# Patient Record
Sex: MALE | Race: WHITE | Employment: OTHER | URBAN - METROPOLITAN AREA
[De-identification: names, ages, dates, MRNs, and addresses within clinical notes are randomized per-mention and may not be internally consistent; named-entity substitution may affect disease eponyms.]

---

## 2017-01-01 ENCOUNTER — ANESTHESIA EVENT (OUTPATIENT)
Dept: SURGERY | Age: 82
DRG: 470 | End: 2017-01-01
Payer: MEDICARE

## 2017-01-01 ENCOUNTER — HOME CARE VISIT (OUTPATIENT)
Dept: HOME HEALTH SERVICES | Facility: HOME HEALTH | Age: 82
End: 2017-01-01

## 2017-01-01 ENCOUNTER — HOME CARE VISIT (OUTPATIENT)
Dept: SCHEDULING | Facility: HOME HEALTH | Age: 82
End: 2017-01-01
Payer: MEDICARE

## 2017-01-01 ENCOUNTER — APPOINTMENT (OUTPATIENT)
Dept: CT IMAGING | Age: 82
DRG: 871 | End: 2017-01-01
Attending: INTERNAL MEDICINE
Payer: MEDICARE

## 2017-01-01 ENCOUNTER — HOSPITAL ENCOUNTER (OUTPATIENT)
Dept: LAB | Age: 82
Discharge: HOME | End: 2017-01-11
Attending: INTERNAL MEDICINE
Payer: MEDICARE

## 2017-01-01 ENCOUNTER — HOSPITAL ENCOUNTER (EMERGENCY)
Age: 82
Discharge: HOME | End: 2017-03-14
Payer: MEDICARE

## 2017-01-01 ENCOUNTER — APPOINTMENT (OUTPATIENT)
Dept: ULTRASOUND IMAGING | Age: 82
DRG: 871 | End: 2017-01-01
Attending: INTERNAL MEDICINE
Payer: MEDICARE

## 2017-01-01 ENCOUNTER — HOSPITAL ENCOUNTER (INPATIENT)
Age: 82
LOS: 5 days | Discharge: HOME HEALTH | DRG: 871 | End: 2017-02-06
Attending: EMERGENCY MEDICINE | Admitting: INTERNAL MEDICINE
Payer: MEDICARE

## 2017-01-01 ENCOUNTER — HOSPITAL ENCOUNTER (INPATIENT)
Age: 82
LOS: 1 days | Discharge: HOME HEALTH | DRG: 470 | End: 2017-02-24
Attending: ORTHOPAEDIC SURGERY | Admitting: ORTHOPAEDIC SURGERY
Payer: MEDICARE

## 2017-01-01 ENCOUNTER — HOME CARE VISIT (OUTPATIENT)
Dept: HOME HEALTH SERVICES | Facility: HOME HEALTH | Age: 82
End: 2017-01-01
Payer: MEDICARE

## 2017-01-01 ENCOUNTER — HOSPITAL ENCOUNTER (OUTPATIENT)
Dept: PHYSICAL THERAPY | Age: 82
End: 2017-01-01

## 2017-01-01 ENCOUNTER — APPOINTMENT (OUTPATIENT)
Dept: GENERAL RADIOLOGY | Age: 82
DRG: 871 | End: 2017-01-01
Attending: EMERGENCY MEDICINE
Payer: MEDICARE

## 2017-01-01 ENCOUNTER — SURGERY (OUTPATIENT)
Age: 82
End: 2017-01-01

## 2017-01-01 ENCOUNTER — APPOINTMENT (OUTPATIENT)
Dept: GENERAL RADIOLOGY | Age: 82
DRG: 871 | End: 2017-01-01
Attending: FAMILY MEDICINE
Payer: MEDICARE

## 2017-01-01 ENCOUNTER — HOSPITAL ENCOUNTER (OUTPATIENT)
Dept: SURGERY | Age: 82
Discharge: HOME | End: 2017-02-06

## 2017-01-01 ENCOUNTER — ANESTHESIA (OUTPATIENT)
Dept: SURGERY | Age: 82
DRG: 470 | End: 2017-01-01
Payer: MEDICARE

## 2017-01-01 ENCOUNTER — APPOINTMENT (OUTPATIENT)
Dept: CT IMAGING | Age: 82
End: 2017-01-01
Attending: EMERGENCY MEDICINE
Payer: MEDICARE

## 2017-01-01 ENCOUNTER — APPOINTMENT (OUTPATIENT)
Dept: GENERAL RADIOLOGY | Age: 82
End: 2017-01-01
Payer: MEDICARE

## 2017-01-01 ENCOUNTER — APPOINTMENT (OUTPATIENT)
Dept: CT IMAGING | Age: 82
DRG: 871 | End: 2017-01-01
Attending: EMERGENCY MEDICINE
Payer: MEDICARE

## 2017-01-01 ENCOUNTER — HOSPITAL ENCOUNTER (OUTPATIENT)
Dept: PHYSICAL THERAPY | Age: 82
Discharge: HOME | End: 2017-02-14
Payer: MEDICARE

## 2017-01-01 ENCOUNTER — HOME HEALTH ADMISSION (OUTPATIENT)
Dept: HOME HEALTH SERVICES | Facility: HOME HEALTH | Age: 82
End: 2017-01-01

## 2017-01-01 ENCOUNTER — APPOINTMENT (OUTPATIENT)
Dept: CT IMAGING | Age: 82
End: 2017-01-01
Payer: MEDICARE

## 2017-01-01 ENCOUNTER — HOSPITAL ENCOUNTER (OUTPATIENT)
Dept: SURGERY | Age: 82
Discharge: HOME | End: 2017-02-14
Payer: MEDICARE

## 2017-01-01 ENCOUNTER — HOSPITAL ENCOUNTER (INPATIENT)
Age: 82
LOS: 2 days | DRG: 871 | End: 2017-11-26
Attending: EMERGENCY MEDICINE | Admitting: INTERNAL MEDICINE
Payer: MEDICARE

## 2017-01-01 ENCOUNTER — HOME HEALTH ADMISSION (OUTPATIENT)
Dept: HOME HEALTH SERVICES | Facility: HOME HEALTH | Age: 82
End: 2017-01-01
Payer: MEDICARE

## 2017-01-01 ENCOUNTER — HOSPITAL ENCOUNTER (EMERGENCY)
Age: 82
Discharge: HOME | End: 2017-04-12
Attending: EMERGENCY MEDICINE
Payer: MEDICARE

## 2017-01-01 ENCOUNTER — APPOINTMENT (OUTPATIENT)
Dept: GENERAL RADIOLOGY | Age: 82
DRG: 871 | End: 2017-01-01
Attending: INTERNAL MEDICINE
Payer: MEDICARE

## 2017-01-01 VITALS
RESPIRATION RATE: 18 BRPM | OXYGEN SATURATION: 94 % | DIASTOLIC BLOOD PRESSURE: 60 MMHG | SYSTOLIC BLOOD PRESSURE: 122 MMHG | HEART RATE: 72 BPM | TEMPERATURE: 97.1 F

## 2017-01-01 VITALS
HEIGHT: 70 IN | WEIGHT: 215 LBS | RESPIRATION RATE: 18 BRPM | DIASTOLIC BLOOD PRESSURE: 64 MMHG | BODY MASS INDEX: 30.78 KG/M2 | TEMPERATURE: 98.2 F | HEART RATE: 84 BPM | SYSTOLIC BLOOD PRESSURE: 133 MMHG | OXYGEN SATURATION: 94 %

## 2017-01-01 VITALS
OXYGEN SATURATION: 41 % | HEIGHT: 70 IN | BODY MASS INDEX: 30.74 KG/M2 | TEMPERATURE: 101.6 F | DIASTOLIC BLOOD PRESSURE: 17 MMHG | WEIGHT: 214.73 LBS | SYSTOLIC BLOOD PRESSURE: 32 MMHG

## 2017-01-01 VITALS
RESPIRATION RATE: 16 BRPM | TEMPERATURE: 95.8 F | HEART RATE: 84 BPM | SYSTOLIC BLOOD PRESSURE: 121 MMHG | DIASTOLIC BLOOD PRESSURE: 74 MMHG | OXYGEN SATURATION: 96 %

## 2017-01-01 VITALS
SYSTOLIC BLOOD PRESSURE: 104 MMHG | DIASTOLIC BLOOD PRESSURE: 76 MMHG | BODY MASS INDEX: 30.64 KG/M2 | WEIGHT: 214 LBS | HEART RATE: 74 BPM | HEIGHT: 70 IN | OXYGEN SATURATION: 98 % | RESPIRATION RATE: 16 BRPM | TEMPERATURE: 97.8 F

## 2017-01-01 VITALS
SYSTOLIC BLOOD PRESSURE: 144 MMHG | WEIGHT: 215 LBS | RESPIRATION RATE: 16 BRPM | BODY MASS INDEX: 30.78 KG/M2 | DIASTOLIC BLOOD PRESSURE: 61 MMHG | HEART RATE: 82 BPM | TEMPERATURE: 98.2 F | HEIGHT: 70 IN | OXYGEN SATURATION: 97 %

## 2017-01-01 VITALS
SYSTOLIC BLOOD PRESSURE: 126 MMHG | DIASTOLIC BLOOD PRESSURE: 78 MMHG | TEMPERATURE: 96.9 F | RESPIRATION RATE: 18 BRPM | HEART RATE: 68 BPM

## 2017-01-01 VITALS
OXYGEN SATURATION: 94 % | HEART RATE: 72 BPM | WEIGHT: 214 LBS | HEIGHT: 70 IN | RESPIRATION RATE: 20 BRPM | BODY MASS INDEX: 30.64 KG/M2 | TEMPERATURE: 97.8 F | DIASTOLIC BLOOD PRESSURE: 52 MMHG | SYSTOLIC BLOOD PRESSURE: 124 MMHG

## 2017-01-01 VITALS
TEMPERATURE: 98.2 F | HEART RATE: 56 BPM | BODY MASS INDEX: 32.07 KG/M2 | SYSTOLIC BLOOD PRESSURE: 121 MMHG | OXYGEN SATURATION: 92 % | RESPIRATION RATE: 16 BRPM | DIASTOLIC BLOOD PRESSURE: 62 MMHG | HEIGHT: 70 IN | WEIGHT: 224 LBS

## 2017-01-01 VITALS
SYSTOLIC BLOOD PRESSURE: 124 MMHG | HEART RATE: 76 BPM | DIASTOLIC BLOOD PRESSURE: 74 MMHG | RESPIRATION RATE: 18 BRPM | TEMPERATURE: 96 F

## 2017-01-01 VITALS
SYSTOLIC BLOOD PRESSURE: 122 MMHG | DIASTOLIC BLOOD PRESSURE: 60 MMHG | OXYGEN SATURATION: 94 % | RESPIRATION RATE: 18 BRPM | HEART RATE: 78 BPM | TEMPERATURE: 97.1 F

## 2017-01-01 VITALS
TEMPERATURE: 98.1 F | HEART RATE: 81 BPM | RESPIRATION RATE: 18 BRPM | OXYGEN SATURATION: 92 % | SYSTOLIC BLOOD PRESSURE: 124 MMHG | DIASTOLIC BLOOD PRESSURE: 60 MMHG

## 2017-01-01 VITALS
TEMPERATURE: 96.4 F | SYSTOLIC BLOOD PRESSURE: 130 MMHG | RESPIRATION RATE: 18 BRPM | HEART RATE: 72 BPM | OXYGEN SATURATION: 93 % | DIASTOLIC BLOOD PRESSURE: 68 MMHG

## 2017-01-01 VITALS
RESPIRATION RATE: 16 BRPM | TEMPERATURE: 98.1 F | DIASTOLIC BLOOD PRESSURE: 65 MMHG | SYSTOLIC BLOOD PRESSURE: 138 MMHG | HEART RATE: 87 BPM | OXYGEN SATURATION: 92 %

## 2017-01-01 DIAGNOSIS — L89.153 SACRAL DECUBITUS ULCER, STAGE III (HCC): ICD-10-CM

## 2017-01-01 DIAGNOSIS — Z96.651 STATUS POST TOTAL RIGHT KNEE REPLACEMENT: Primary | ICD-10-CM

## 2017-01-01 DIAGNOSIS — R10.84 ABDOMINAL PAIN, GENERALIZED: ICD-10-CM

## 2017-01-01 DIAGNOSIS — S50.02XA CONTUSION OF LEFT ELBOW, INITIAL ENCOUNTER: ICD-10-CM

## 2017-01-01 DIAGNOSIS — J69.0 ASPIRATION PNEUMONIA OF RIGHT LOWER LOBE DUE TO GASTRIC SECRETIONS (HCC): ICD-10-CM

## 2017-01-01 DIAGNOSIS — G93.40 ACUTE ENCEPHALOPATHY: Primary | ICD-10-CM

## 2017-01-01 DIAGNOSIS — J43.9 PULMONARY EMPHYSEMA, UNSPECIFIED EMPHYSEMA TYPE (HCC): Chronic | ICD-10-CM

## 2017-01-01 DIAGNOSIS — Z95.1 STATUS POST CORONARY ARTERY BYPASS GRAFT: ICD-10-CM

## 2017-01-01 DIAGNOSIS — E78.2 MIXED HYPERLIPIDEMIA: Chronic | ICD-10-CM

## 2017-01-01 DIAGNOSIS — R65.20 SEVERE SEPSIS (HCC): Primary | ICD-10-CM

## 2017-01-01 DIAGNOSIS — S80.01XA CONTUSION OF RIGHT KNEE, INITIAL ENCOUNTER: ICD-10-CM
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
## 2017-01-01 DIAGNOSIS — L89.153 DECUBITUS ULCER OF SACRAL REGION, STAGE 3 (HCC): ICD-10-CM

## 2017-01-01 DIAGNOSIS — N17.9 AKI (ACUTE KIDNEY INJURY) (HCC): ICD-10-CM

## 2017-01-01 DIAGNOSIS — S09.90XA MILD CLOSED HEAD INJURY, INITIAL ENCOUNTER: Primary | ICD-10-CM

## 2017-01-01 DIAGNOSIS — A41.9 SEVERE SEPSIS (HCC): Primary | ICD-10-CM

## 2017-01-01 DIAGNOSIS — R53.81 DEBILITY, UNSPECIFIED: ICD-10-CM

## 2017-01-01 DIAGNOSIS — S00.03XA CONTUSION OF SCALP, INITIAL ENCOUNTER: ICD-10-CM

## 2017-01-01 DIAGNOSIS — A41.9 SEPSIS, DUE TO UNSPECIFIED ORGANISM: ICD-10-CM

## 2017-01-01 DIAGNOSIS — J69.0 ASPIRATION PNEUMONIA OF RIGHT LOWER LOBE, UNSPECIFIED ASPIRATION PNEUMONIA TYPE (HCC): ICD-10-CM

## 2017-01-01 DIAGNOSIS — R13.10 DYSPHAGIA, UNSPECIFIED TYPE: ICD-10-CM

## 2017-01-01 DIAGNOSIS — J96.00 ACUTE RESPIRATORY FAILURE, UNSPECIFIED WHETHER WITH HYPOXIA OR HYPERCAPNIA (HCC): ICD-10-CM

## 2017-01-01 DIAGNOSIS — G93.40 ACUTE ENCEPHALOPATHY: ICD-10-CM

## 2017-01-01 LAB
ABO + RH BLD: NORMAL
ALBUMIN SERPL BCP-MCNC: 3.3 G/DL (ref 3.2–4.6)
ALBUMIN SERPL BCP-MCNC: 3.5 G/DL (ref 3.2–4.6)
ALBUMIN SERPL-MCNC: 2.1 G/DL (ref 3.2–4.6)
ALBUMIN SERPL-MCNC: 2.6 G/DL (ref 3.2–4.6)
ALBUMIN SERPL-MCNC: 3.4 G/DL (ref 3.2–4.6)
ALBUMIN/GLOB SERPL: 0.7 {RATIO} (ref 1.2–3.5)
ALBUMIN/GLOB SERPL: 0.8 {RATIO} (ref 1.2–3.5)
ALBUMIN/GLOB SERPL: 0.9 {RATIO} (ref 1.2–3.5)
ALBUMIN/GLOB SERPL: 1 {RATIO}
ALBUMIN/GLOB SERPL: 1 {RATIO} (ref 1.2–3.5)
ALP SERPL-CCNC: 71 U/L (ref 50–136)
ALP SERPL-CCNC: 71 U/L (ref 50–136)
ALP SERPL-CCNC: 77 U/L (ref 50–136)
ALP SERPL-CCNC: 87 U/L (ref 50–136)
ALP SERPL-CCNC: 98 U/L (ref 50–136)
ALT SERPL-CCNC: 31 U/L (ref 12–65)
ALT SERPL-CCNC: 34 U/L (ref 12–65)
ALT SERPL-CCNC: 38 U/L (ref 12–65)
ALT SERPL-CCNC: 50 U/L (ref 12–65)
ALT SERPL-CCNC: 63 U/L (ref 12–65)
ANION GAP BLD CALC-SCNC: 10 MMOL/L (ref 7–16)
ANION GAP BLD CALC-SCNC: 6 MMOL/L (ref 7–16)
ANION GAP BLD CALC-SCNC: 8 MMOL/L (ref 7–16)
ANION GAP BLD CALC-SCNC: 9 MMOL/L (ref 7–16)
ANION GAP SERPL CALC-SCNC: 12 MMOL/L (ref 7–16)
ANION GAP SERPL CALC-SCNC: 12 MMOL/L (ref 7–16)
ANION GAP SERPL CALC-SCNC: 14 MMOL/L (ref 7–16)
ANION GAP SERPL CALC-SCNC: 16 MMOL/L (ref 7–16)
APPEARANCE UR: CLEAR
APTT PPP: 25.7 SEC (ref 25.3–32.9)
APTT PPP: 37.7 SEC (ref 23.5–31.7)
ARTERIAL PATENCY WRIST A: POSITIVE
AST SERPL W P-5'-P-CCNC: 23 U/L (ref 15–37)
AST SERPL W P-5'-P-CCNC: 27 U/L (ref 15–37)
AST SERPL-CCNC: 114 U/L (ref 15–37)
AST SERPL-CCNC: 154 U/L (ref 15–37)
AST SERPL-CCNC: 80 U/L (ref 15–37)
ATRIAL RATE: 107 BPM
ATRIAL RATE: 86 BPM
BACTERIA SPEC CULT: NORMAL
BACTERIA URNS QL MICRO: NORMAL /HPF
BASE DEFICIT BLDA-SCNC: 3.5 MMOL/L (ref 0–2)
BASE DEFICIT BLDA-SCNC: 4.4 MMOL/L (ref 0–2)
BASE DEFICIT BLDA-SCNC: 4.9 MMOL/L (ref 0–2)
BASOPHILS # BLD AUTO: 0 K/UL (ref 0–0.2)
BASOPHILS # BLD: 0 % (ref 0–2)
BASOPHILS # BLD: 0.1 K/UL (ref 0–0.2)
BASOPHILS NFR BLD: 0 % (ref 0–2)
BDY SITE: ABNORMAL
BILIRUB DIRECT SERPL-MCNC: 0.2 MG/DL
BILIRUB SERPL-MCNC: 0.5 MG/DL (ref 0.2–1.1)
BILIRUB SERPL-MCNC: 0.6 MG/DL (ref 0.2–1.1)
BILIRUB SERPL-MCNC: 0.6 MG/DL (ref 0.2–1.1)
BILIRUB SERPL-MCNC: 0.7 MG/DL (ref 0.2–1.1)
BILIRUB SERPL-MCNC: 1.2 MG/DL (ref 0.2–1.1)
BILIRUB UR QL: NEGATIVE
BLASTS NFR BLD MANUAL: 5 %
BLOOD GROUP ANTIBODIES SERPL: NORMAL
BNP SERPL-MCNC: 35 PG/ML
BNP SERPL-MCNC: 85 PG/ML
BUN SERPL-MCNC: 13 MG/DL (ref 8–23)
BUN SERPL-MCNC: 16 MG/DL (ref 8–23)
BUN SERPL-MCNC: 16 MG/DL (ref 8–23)
BUN SERPL-MCNC: 18 MG/DL (ref 8–23)
BUN SERPL-MCNC: 41 MG/DL (ref 8–23)
BUN SERPL-MCNC: 46 MG/DL (ref 8–23)
BUN SERPL-MCNC: 53 MG/DL (ref 8–23)
BUN SERPL-MCNC: 55 MG/DL (ref 8–23)
CA-I BLD-SCNC: 1.22 MMOL/L (ref 1–1.3)
CALCIUM SERPL-MCNC: 7.8 MG/DL (ref 8.3–10.4)
CALCIUM SERPL-MCNC: 8 MG/DL (ref 8.3–10.4)
CALCIUM SERPL-MCNC: 8.2 MG/DL (ref 8.3–10.4)
CALCIUM SERPL-MCNC: 8.4 MG/DL (ref 8.3–10.4)
CALCIUM SERPL-MCNC: 8.8 MG/DL (ref 8.3–10.4)
CALCIUM SERPL-MCNC: 9.3 MG/DL (ref 8.3–10.4)
CALCULATED P AXIS, ECG09: 41 DEGREES
CALCULATED P AXIS, ECG09: 57 DEGREES
CALCULATED R AXIS, ECG10: -66 DEGREES
CALCULATED R AXIS, ECG10: -73 DEGREES
CALCULATED T AXIS, ECG11: 30 DEGREES
CALCULATED T AXIS, ECG11: 44 DEGREES
CASTS URNS QL MICRO: 0 /LPF
CHLORIDE BLDA-SCNC: 107 MMOL/L (ref 98–106)
CHLORIDE SERPL-SCNC: 101 MMOL/L (ref 98–107)
CHLORIDE SERPL-SCNC: 102 MMOL/L (ref 98–107)
CHLORIDE SERPL-SCNC: 103 MMOL/L (ref 98–107)
CHLORIDE SERPL-SCNC: 104 MMOL/L (ref 98–107)
CHLORIDE SERPL-SCNC: 107 MMOL/L (ref 98–107)
CHLORIDE SERPL-SCNC: 107 MMOL/L (ref 98–107)
CHLORIDE SERPL-SCNC: 109 MMOL/L (ref 98–107)
CHLORIDE SERPL-SCNC: 110 MMOL/L (ref 98–107)
CHOLEST SERPL-MCNC: 110 MG/DL
CHOLEST SERPL-MCNC: 124 MG/DL
CO2 SERPL-SCNC: 21 MMOL/L (ref 21–32)
CO2 SERPL-SCNC: 22 MMOL/L (ref 21–32)
CO2 SERPL-SCNC: 24 MMOL/L (ref 21–32)
CO2 SERPL-SCNC: 25 MMOL/L (ref 21–32)
CO2 SERPL-SCNC: 27 MMOL/L (ref 21–32)
CO2 SERPL-SCNC: 28 MMOL/L (ref 21–32)
CO2 SERPL-SCNC: 30 MMOL/L (ref 21–32)
CO2 SERPL-SCNC: 31 MMOL/L (ref 21–32)
COHGB MFR BLD: 0.7 % (ref 0.5–1.5)
COHGB MFR BLD: 1 % (ref 0.5–1.5)
COHGB MFR BLD: 2.3 % (ref 0.5–1.5)
COLLECTION COMMENT, COLCM: NORMAL
COLOR UR: YELLOW
CORTIS AM PEAK SERPL-MCNC: >75 UG/DL (ref 7–25)
CREAT SERPL-MCNC: 0.79 MG/DL (ref 0.8–1.5)
CREAT SERPL-MCNC: 0.93 MG/DL (ref 0.8–1.5)
CREAT SERPL-MCNC: 0.99 MG/DL (ref 0.8–1.5)
CREAT SERPL-MCNC: 1.02 MG/DL (ref 0.8–1.5)
CREAT SERPL-MCNC: 1.03 MG/DL (ref 0.8–1.5)
CREAT SERPL-MCNC: 1.04 MG/DL (ref 0.8–1.5)
CREAT SERPL-MCNC: 1.49 MG/DL (ref 0.8–1.5)
CREAT SERPL-MCNC: 2.08 MG/DL (ref 0.8–1.5)
CREAT UR-MCNC: 149 MG/DL
CRYSTALS URNS QL MICRO: NORMAL /LPF
D DIMER PPP FEU-MCNC: 1.29 UG/ML(FEU)
DIAGNOSIS, 93000: NORMAL
DIAGNOSIS, 93000: NORMAL
DIASTOLIC BP, ECG02: NORMAL MMHG
DIFFERENTIAL METHOD BLD: ABNORMAL
DO-HGB BLD-MCNC: 4 % (ref 0–5)
DO-HGB BLD-MCNC: 6 % (ref 0–5)
DO-HGB BLD-MCNC: 6 % (ref 0–5)
EOSINOPHIL # BLD: 0 K/UL (ref 0–0.8)
EOSINOPHIL # BLD: 0.1 K/UL (ref 0–0.8)
EOSINOPHIL #/AREA URNS HPF: NEGATIVE /[HPF]
EOSINOPHIL NFR BLD: 0 % (ref 0.5–7.8)
EOSINOPHIL NFR BLD: 1 % (ref 0.5–7.8)
EPI CELLS #/AREA URNS HPF: 0 /HPF
ERYTHROCYTE [DISTWIDTH] IN BLOOD BY AUTOMATED COUNT: 14 % (ref 11.9–14.6)
ERYTHROCYTE [DISTWIDTH] IN BLOOD BY AUTOMATED COUNT: 14.6 % (ref 11.9–14.6)
ERYTHROCYTE [DISTWIDTH] IN BLOOD BY AUTOMATED COUNT: 14.7 % (ref 11.9–14.6)
ERYTHROCYTE [DISTWIDTH] IN BLOOD BY AUTOMATED COUNT: 14.7 % (ref 11.9–14.6)
ERYTHROCYTE [DISTWIDTH] IN BLOOD BY AUTOMATED COUNT: 14.9 % (ref 11.9–14.6)
ERYTHROCYTE [DISTWIDTH] IN BLOOD BY AUTOMATED COUNT: 14.9 % (ref 11.9–14.6)
EST. AVERAGE GLUCOSE BLD GHB EST-MCNC: 146 MG/DL
FIO2 ON VENT: 80 %
FLUAV AG NPH QL IA: NEGATIVE
FLUBV AG NPH QL IA: NEGATIVE
GAS FLOW.O2 O2 DELIVERY SYS: 15 L/MIN
GAS FLOW.O2 O2 DELIVERY SYS: 4 L/MIN
GLOBULIN SER CALC-MCNC: 3.1 G/DL (ref 2.3–3.5)
GLOBULIN SER CALC-MCNC: 3.2 G/DL
GLOBULIN SER CALC-MCNC: 3.4 G/DL (ref 2.3–3.5)
GLOBULIN SER CALC-MCNC: 3.5 G/DL (ref 2.3–3.5)
GLOBULIN SER CALC-MCNC: 3.6 G/DL (ref 2.3–3.5)
GLUCOSE BLD STRIP.AUTO-MCNC: 130 MG/DL (ref 65–100)
GLUCOSE BLD STRIP.AUTO-MCNC: 194 MG/DL (ref 65–100)
GLUCOSE BLD STRIP.AUTO-MCNC: 213 MG/DL (ref 65–100)
GLUCOSE SERPL-MCNC: 134 MG/DL (ref 65–100)
GLUCOSE SERPL-MCNC: 143 MG/DL (ref 65–100)
GLUCOSE SERPL-MCNC: 143 MG/DL (ref 65–100)
GLUCOSE SERPL-MCNC: 171 MG/DL (ref 65–100)
GLUCOSE SERPL-MCNC: 187 MG/DL (ref 65–100)
GLUCOSE SERPL-MCNC: 193 MG/DL (ref 65–100)
GLUCOSE SERPL-MCNC: 226 MG/DL (ref 65–100)
GLUCOSE SERPL-MCNC: 98 MG/DL (ref 65–100)
GLUCOSE UR STRIP.AUTO-MCNC: NEGATIVE MG/DL
GRAM STN SPEC: NORMAL
GRAM STN SPEC: NORMAL
HBA1C MFR BLD: 6.7 % (ref 4.8–6)
HCO3 BLDA-SCNC: 20 MMOL/L (ref 22–26)
HCO3 BLDA-SCNC: 20 MMOL/L (ref 22–26)
HCO3 BLDA-SCNC: 22 MMOL/L (ref 22–26)
HCT VFR BLD AUTO: 39.8 % (ref 41.1–50.3)
HCT VFR BLD AUTO: 41.1 % (ref 41.1–50.3)
HCT VFR BLD AUTO: 41.3 % (ref 41.1–50.3)
HCT VFR BLD AUTO: 43.1 % (ref 41.1–50.3)
HCT VFR BLD AUTO: 43.7 % (ref 41.1–50.3)
HCT VFR BLD AUTO: 45.1 % (ref 41.1–50.3)
HCT VFR BLD AUTO: 45.7 % (ref 41.1–50.3)
HCT VFR BLD AUTO: 47.2 % (ref 41.1–50.3)
HDLC SERPL-MCNC: 42 MG/DL (ref 40–60)
HDLC SERPL-MCNC: 45 MG/DL (ref 40–60)
HDLC SERPL: 2.4 {RATIO}
HDLC SERPL: 3 {RATIO}
HGB BLD-MCNC: 10.5 G/DL (ref 13.6–17.2)
HGB BLD-MCNC: 11.5 G/DL (ref 13.6–17.2)
HGB BLD-MCNC: 12.6 G/DL (ref 13.6–17.2)
HGB BLD-MCNC: 12.8 G/DL (ref 13.6–17.2)
HGB BLD-MCNC: 13.9 G/DL (ref 13.6–17.2)
HGB BLD-MCNC: 14 G/DL (ref 13.6–17.2)
HGB BLD-MCNC: 14 G/DL (ref 13.6–17.2)
HGB BLD-MCNC: 14.9 G/DL (ref 13.6–17.2)
HGB BLD-MCNC: 15.2 G/DL (ref 13.6–17.2)
HGB BLD-MCNC: 15.7 G/DL (ref 13.6–17.2)
HGB BLDMV-MCNC: 14.5 GM/DL (ref 11.7–15)
HGB BLDMV-MCNC: 14.7 GM/DL (ref 11.7–15)
HGB BLDMV-MCNC: 15 GM/DL (ref 11.7–15)
HGB UR QL STRIP: NEGATIVE
IMM GRANULOCYTES # BLD: 0 K/UL (ref 0–0.5)
IMM GRANULOCYTES # BLD: 0 K/UL (ref 0–0.5)
IMM GRANULOCYTES # BLD: 0.1 K/UL (ref 0–0.5)
IMM GRANULOCYTES # BLD: 0.1 K/UL (ref 0–0.5)
IMM GRANULOCYTES NFR BLD AUTO: 0.2 % (ref 0–5)
IMM GRANULOCYTES NFR BLD AUTO: 0.4 % (ref 0–5)
IMM GRANULOCYTES NFR BLD AUTO: 1 % (ref 0–5)
IMM GRANULOCYTES NFR BLD AUTO: 1 % (ref 0–5)
INR PPP: 1 (ref 0.9–1.2)
KETONES UR QL STRIP.AUTO: ABNORMAL MG/DL
LACTATE BLD-SCNC: 1.2 MMOL/L (ref 0.5–1.9)
LACTATE BLD-SCNC: 3.2 MMOL/L (ref 0.5–1.9)
LACTATE BLD-SCNC: 3.5 MMOL/L (ref 0.5–1.9)
LACTATE SERPL-SCNC: 2 MMOL/L (ref 0.4–2)
LACTATE SERPL-SCNC: 2.6 MMOL/L (ref 0.4–2)
LACTATE SERPL-SCNC: 2.9 MMOL/L (ref 0.4–2)
LACTATE SERPL-SCNC: 3.1 MMOL/L (ref 0.4–2)
LACTATE SERPL-SCNC: 3.3 MMOL/L (ref 0.4–2)
LACTATE SERPL-SCNC: 3.4 MMOL/L (ref 0.4–2)
LACTATE SERPL-SCNC: 4.8 MMOL/L (ref 0.4–2)
LACTATE SERPL-SCNC: 5.7 MMOL/L (ref 0.4–2)
LDLC SERPL CALC-MCNC: 46 MG/DL
LDLC SERPL CALC-MCNC: 55 MG/DL
LEUKOCYTE ESTERASE UR QL STRIP.AUTO: NEGATIVE
LIPASE SERPL-CCNC: 159 U/L (ref 73–393)
LIPID PROFILE,FLP: NORMAL
LIPID PROFILE,FLP: NORMAL
LYMPHOCYTES # BLD AUTO: 13 % (ref 13–44)
LYMPHOCYTES # BLD AUTO: 17 % (ref 13–44)
LYMPHOCYTES # BLD AUTO: 8 % (ref 13–44)
LYMPHOCYTES # BLD: 0.3 K/UL (ref 0.5–4.6)
LYMPHOCYTES # BLD: 0.6 K/UL (ref 0.5–4.6)
LYMPHOCYTES # BLD: 0.8 K/UL (ref 0.5–4.6)
LYMPHOCYTES # BLD: 1 K/UL (ref 0.5–4.6)
LYMPHOCYTES # BLD: 1.3 K/UL (ref 0.5–4.6)
LYMPHOCYTES # BLD: 1.3 K/UL (ref 0.5–4.6)
LYMPHOCYTES NFR BLD MANUAL: 11 % (ref 16–44)
LYMPHOCYTES NFR BLD MANUAL: 3 % (ref 16–44)
LYMPHOCYTES NFR BLD: 3 % (ref 13–44)
MAGNESIUM SERPL-MCNC: 2.2 MG/DL (ref 1.8–2.4)
MAGNESIUM SERPL-MCNC: 2.3 MG/DL (ref 1.8–2.4)
MAGNESIUM SERPL-MCNC: 2.3 MG/DL (ref 1.8–2.4)
MCH RBC QN AUTO: 29.4 PG (ref 26.1–32.9)
MCH RBC QN AUTO: 29.6 PG (ref 26.1–32.9)
MCH RBC QN AUTO: 29.9 PG (ref 26.1–32.9)
MCH RBC QN AUTO: 31 PG (ref 26.1–32.9)
MCH RBC QN AUTO: 31.1 PG (ref 26.1–32.9)
MCH RBC QN AUTO: 31.9 PG (ref 26.1–32.9)
MCH RBC QN AUTO: 32.1 PG (ref 26.1–32.9)
MCH RBC QN AUTO: 32.3 PG (ref 26.1–32.9)
MCHC RBC AUTO-ENTMCNC: 31.1 G/DL (ref 31.4–35)
MCHC RBC AUTO-ENTMCNC: 31.6 G/DL (ref 31.4–35)
MCHC RBC AUTO-ENTMCNC: 31.7 G/DL (ref 31.4–35)
MCHC RBC AUTO-ENTMCNC: 32 G/DL (ref 31.4–35)
MCHC RBC AUTO-ENTMCNC: 32.3 G/DL (ref 31.4–35)
MCHC RBC AUTO-ENTMCNC: 33.3 G/DL (ref 31.4–35)
MCHC RBC AUTO-ENTMCNC: 33.9 G/DL (ref 31.4–35)
MCHC RBC AUTO-ENTMCNC: 34.8 G/DL (ref 31.4–35)
MCV RBC AUTO: 92.2 FL (ref 79.6–97.8)
MCV RBC AUTO: 93.4 FL (ref 79.6–97.8)
MCV RBC AUTO: 93.7 FL (ref 79.6–97.8)
MCV RBC AUTO: 94.1 FL (ref 79.6–97.8)
MCV RBC AUTO: 94.3 FL (ref 79.6–97.8)
MCV RBC AUTO: 96.2 FL (ref 79.6–97.8)
MCV RBC AUTO: 97.2 FL (ref 79.6–97.8)
MCV RBC AUTO: 98.3 FL (ref 79.6–97.8)
METAMYELOCYTES NFR BLD MANUAL: 11 %
METAMYELOCYTES NFR BLD MANUAL: 5 %
METHGB MFR BLD: 0.3 % (ref 0–1.5)
METHGB MFR BLD: 0.6 % (ref 0–1.5)
METHGB MFR BLD: 0.6 % (ref 0–1.5)
MM INDURATION POC: NORMAL MM (ref 0–5)
MONOCYTES # BLD: 0.1 K/UL (ref 0.1–1.3)
MONOCYTES # BLD: 0.3 K/UL (ref 0.1–1.3)
MONOCYTES # BLD: 0.3 K/UL (ref 0.1–1.3)
MONOCYTES # BLD: 0.7 K/UL (ref 0.1–1.3)
MONOCYTES # BLD: 1.2 K/UL (ref 0.1–1.3)
MONOCYTES # BLD: 2 K/UL (ref 0.1–1.3)
MONOCYTES NFR BLD AUTO: 3 % (ref 4–12)
MONOCYTES NFR BLD AUTO: 4 % (ref 4–12)
MONOCYTES NFR BLD AUTO: 8 % (ref 4–12)
MONOCYTES NFR BLD MANUAL: 1 % (ref 3–9)
MONOCYTES NFR BLD MANUAL: 10 % (ref 3–9)
MONOCYTES NFR BLD: 9 % (ref 4–12)
MUCOUS THREADS URNS QL MICRO: 0 /LPF
MYELOCYTES NFR BLD MANUAL: 13 %
MYELOCYTES NFR BLD MANUAL: 2 %
NEUTS BAND NFR BLD MANUAL: 16 % (ref 0–10)
NEUTS BAND NFR BLD MANUAL: 5 % (ref 0–10)
NEUTS SEG # BLD: 20.3 K/UL (ref 1.7–8.2)
NEUTS SEG # BLD: 4.6 K/UL (ref 1.7–8.2)
NEUTS SEG # BLD: 7.1 K/UL (ref 1.7–8.2)
NEUTS SEG # BLD: 8.3 K/UL (ref 1.7–8.2)
NEUTS SEG # BLD: 9.4 K/UL (ref 1.7–8.2)
NEUTS SEG # BLD: 9.4 K/UL (ref 1.7–8.2)
NEUTS SEG NFR BLD AUTO: 79 % (ref 43–78)
NEUTS SEG NFR BLD AUTO: 82 % (ref 43–78)
NEUTS SEG NFR BLD AUTO: 84 % (ref 43–78)
NEUTS SEG NFR BLD MANUAL: 50 % (ref 47–75)
NEUTS SEG NFR BLD MANUAL: 64 % (ref 47–75)
NEUTS SEG NFR BLD: 87 % (ref 43–78)
NITRITE UR QL STRIP.AUTO: NEGATIVE
NRBC BLD-RTO: 4 PER 100 WBC
OTHER OBSERVATIONS,UCOM: NORMAL
OXYHGB MFR BLDA: 91.9 % (ref 94–97)
OXYHGB MFR BLDA: 92.2 % (ref 94–97)
OXYHGB MFR BLDA: 95 % (ref 94–97)
P-R INTERVAL, ECG05: 144 MS
P-R INTERVAL, ECG05: 166 MS
PCO2 BLDA: 35 MMHG (ref 35–45)
PCO2 BLDA: 37 MMHG (ref 35–45)
PCO2 BLDA: 40 MMHG (ref 35–45)
PH BLDA: 7.35 [PH] (ref 7.35–7.45)
PH BLDA: 7.35 [PH] (ref 7.35–7.45)
PH BLDA: 7.38 [PH] (ref 7.35–7.45)
PH UR STRIP: 6 [PH] (ref 5–9)
PHOSPHATE SERPL-MCNC: 2.1 MG/DL (ref 2.3–3.7)
PLATELET # BLD AUTO: 123 K/UL (ref 150–450)
PLATELET # BLD AUTO: 126 K/UL (ref 150–450)
PLATELET # BLD AUTO: 130 K/UL (ref 150–450)
PLATELET # BLD AUTO: 132 K/UL (ref 150–450)
PLATELET # BLD AUTO: 133 K/UL (ref 150–450)
PLATELET # BLD AUTO: 137 K/UL (ref 150–450)
PLATELET # BLD AUTO: 147 K/UL (ref 150–450)
PLATELET # BLD AUTO: 243 K/UL (ref 150–450)
PLATELET COMMENTS,PCOM: SLIGHT
PLATELET COMMENTS,PCOM: SLIGHT
PMV BLD AUTO: 10.1 FL (ref 10.8–14.1)
PMV BLD AUTO: 10.5 FL (ref 10.8–14.1)
PMV BLD AUTO: 10.6 FL (ref 10.8–14.1)
PMV BLD AUTO: 10.9 FL (ref 10.8–14.1)
PMV BLD AUTO: 10.9 FL (ref 10.8–14.1)
PMV BLD AUTO: 11 FL (ref 10.8–14.1)
PMV BLD AUTO: 11.1 FL (ref 10.8–14.1)
PMV BLD AUTO: 11.6 FL (ref 10.8–14.1)
PO2 BLDA: 72 MMHG (ref 80–105)
PO2 BLDA: 77 MMHG (ref 80–105)
PO2 BLDA: 85 MMHG (ref 80–105)
POTASSIUM BLDA-SCNC: 3.88 MMOL/L (ref 3.5–5.3)
POTASSIUM SERPL-SCNC: 3.7 MMOL/L (ref 3.5–5.1)
POTASSIUM SERPL-SCNC: 3.8 MMOL/L (ref 3.5–5.1)
POTASSIUM SERPL-SCNC: 3.8 MMOL/L (ref 3.5–5.1)
POTASSIUM SERPL-SCNC: 4 MMOL/L (ref 3.5–5.1)
POTASSIUM SERPL-SCNC: 4 MMOL/L (ref 3.5–5.1)
POTASSIUM SERPL-SCNC: 4.2 MMOL/L (ref 3.5–5.1)
POTASSIUM SERPL-SCNC: 4.2 MMOL/L (ref 3.5–5.1)
POTASSIUM SERPL-SCNC: 5.8 MMOL/L (ref 3.5–5.1)
PPD POC: NORMAL NEGATIVE
PROCALCITONIN SERPL-MCNC: 2.6 NG/ML
PROCALCITONIN SERPL-MCNC: <0.1 NG/ML
PROMYELOCYTES NFR BLD MANUAL: 4 %
PROT SERPL-MCNC: 5.2 G/DL (ref 6.3–8.2)
PROT SERPL-MCNC: 6 G/DL (ref 6.3–8.2)
PROT SERPL-MCNC: 6.5 G/DL (ref 6.3–8.2)
PROT SERPL-MCNC: 7 G/DL (ref 6.3–8.2)
PROT SERPL-MCNC: 7 G/DL (ref 6.3–8.2)
PROT UR STRIP-MCNC: ABNORMAL MG/DL
PROTHROMBIN TIME: 10.9 SEC (ref 9.6–12)
Q-T INTERVAL, ECG07: 378 MS
Q-T INTERVAL, ECG07: 402 MS
QRS DURATION, ECG06: 142 MS
QRS DURATION, ECG06: 162 MS
QTC CALCULATION (BEZET), ECG08: 452 MS
QTC CALCULATION (BEZET), ECG08: 536 MS
RBC # BLD AUTO: 4.05 M/UL (ref 4.23–5.67)
RBC # BLD AUTO: 4.36 M/UL (ref 4.23–5.67)
RBC # BLD AUTO: 4.39 M/UL (ref 4.23–5.67)
RBC # BLD AUTO: 4.48 M/UL (ref 4.23–5.67)
RBC # BLD AUTO: 4.68 M/UL (ref 4.23–5.67)
RBC # BLD AUTO: 4.7 M/UL (ref 4.23–5.67)
RBC # BLD AUTO: 4.89 M/UL (ref 4.23–5.67)
RBC # BLD AUTO: 5.04 M/UL (ref 4.23–5.67)
RBC #/AREA URNS HPF: NORMAL /HPF
RBC MORPH BLD: ABNORMAL
RESP RATE: 23
SAO2 % BLD: 94 % (ref 92–98.5)
SAO2 % BLD: 94 % (ref 92–98.5)
SAO2 % BLD: 96 % (ref 92–98.5)
SERVICE CMNT-IMP: ABNORMAL
SERVICE CMNT-IMP: NORMAL
SODIUM BLDA-SCNC: 140.5 MMOL/L (ref 135–148)
SODIUM SERPL-SCNC: 138 MMOL/L (ref 136–145)
SODIUM SERPL-SCNC: 141 MMOL/L (ref 136–145)
SODIUM SERPL-SCNC: 141 MMOL/L (ref 136–145)
SODIUM SERPL-SCNC: 142 MMOL/L (ref 136–145)
SODIUM SERPL-SCNC: 142 MMOL/L (ref 136–145)
SODIUM SERPL-SCNC: 144 MMOL/L (ref 136–145)
SODIUM SERPL-SCNC: 144 MMOL/L (ref 136–145)
SODIUM SERPL-SCNC: 146 MMOL/L (ref 136–145)
SODIUM UR-SCNC: 21 MMOL/L
SP GR UR REFRACTOMETRY: 1.02 (ref 1–1.02)
SPECIMEN EXP DATE BLD: NORMAL
SYSTOLIC BP, ECG01: NORMAL MMHG
T4 FREE SERPL-MCNC: 1 NG/DL (ref 0.78–1.46)
TRIGL SERPL-MCNC: 135 MG/DL (ref 35–150)
TRIGL SERPL-MCNC: 95 MG/DL (ref 35–150)
TROPONIN I BLD-MCNC: 0 NG/ML (ref 0.02–0.05)
TROPONIN I BLD-MCNC: 0.02 NG/ML (ref 0–0.08)
TROPONIN I SERPL-MCNC: 0.03 NG/ML (ref 0.02–0.05)
TROPONIN I SERPL-MCNC: <0.02 NG/ML (ref 0.02–0.05)
TSH SERPL DL<=0.005 MIU/L-ACNC: 0.91 UIU/ML (ref 0.36–3.74)
TSH SERPL DL<=0.005 MIU/L-ACNC: 3.8 UIU/ML (ref 0.36–3.74)
UROBILINOGEN UR QL STRIP.AUTO: 1 EU/DL (ref 0.2–1)
VANCOMYCIN TROUGH SERPL-MCNC: 19.8 UG/ML (ref 5–20)
VENTILATION MODE VENT: ABNORMAL
VENTRICULAR RATE, ECG03: 107 BPM
VENTRICULAR RATE, ECG03: 86 BPM
VLDLC SERPL CALC-MCNC: 19 MG/DL (ref 6–23)
VLDLC SERPL CALC-MCNC: 27 MG/DL
WBC # BLD AUTO: 10.1 K/UL (ref 4.3–11.1)
WBC # BLD AUTO: 10.3 K/UL (ref 4.3–11.1)
WBC # BLD AUTO: 11.9 K/UL (ref 4.3–11.1)
WBC # BLD AUTO: 15 K/UL (ref 4.3–11.1)
WBC # BLD AUTO: 15.5 K/UL (ref 4.3–11.1)
WBC # BLD AUTO: 23.2 K/UL (ref 4.3–11.1)
WBC # BLD AUTO: 5.9 K/UL (ref 4.3–11.1)
WBC # BLD AUTO: 8.5 K/UL (ref 4.3–11.1)
WBC MORPH BLD: ABNORMAL
WBC URNS QL MICRO: NORMAL /HPF

## 2017-01-01 PROCEDURE — 74011250636 HC RX REV CODE- 250/636: Performed by: PHYSICIAN ASSISTANT

## 2017-01-01 PROCEDURE — 77030011208: Performed by: ORTHOPAEDIC SURGERY

## 2017-01-01 PROCEDURE — 3331090001 HH PPS REVENUE CREDIT

## 2017-01-01 PROCEDURE — 74011000258 HC RX REV CODE- 258: Performed by: INTERNAL MEDICINE

## 2017-01-01 PROCEDURE — 84484 ASSAY OF TROPONIN QUANT: CPT | Performed by: INTERNAL MEDICINE

## 2017-01-01 PROCEDURE — 83735 ASSAY OF MAGNESIUM: CPT | Performed by: EMERGENCY MEDICINE

## 2017-01-01 PROCEDURE — 84443 ASSAY THYROID STIM HORMONE: CPT | Performed by: EMERGENCY MEDICINE

## 2017-01-01 PROCEDURE — 94760 N-INVAS EAR/PLS OXIMETRY 1: CPT

## 2017-01-01 PROCEDURE — 81003 URINALYSIS AUTO W/O SCOPE: CPT | Performed by: EMERGENCY MEDICINE

## 2017-01-01 PROCEDURE — A4649 SURGICAL SUPPLIES: HCPCS

## 2017-01-01 PROCEDURE — 82962 GLUCOSE BLOOD TEST: CPT

## 2017-01-01 PROCEDURE — G0157 HHC PT ASSISTANT EA 15: HCPCS

## 2017-01-01 PROCEDURE — 74011000250 HC RX REV CODE- 250: Performed by: ANESTHESIOLOGY

## 2017-01-01 PROCEDURE — 83605 ASSAY OF LACTIC ACID: CPT

## 2017-01-01 PROCEDURE — 74011250636 HC RX REV CODE- 250/636: Performed by: EMERGENCY MEDICINE

## 2017-01-01 PROCEDURE — 77030019895 HC PCKNG STRP IODO -A

## 2017-01-01 PROCEDURE — 85025 COMPLETE CBC W/AUTO DIFF WBC: CPT | Performed by: PHYSICIAN ASSISTANT

## 2017-01-01 PROCEDURE — 3331090002 HH PPS REVENUE DEBIT

## 2017-01-01 PROCEDURE — 84145 PROCALCITONIN (PCT): CPT | Performed by: INTERNAL MEDICINE

## 2017-01-01 PROCEDURE — 74011250637 HC RX REV CODE- 250/637: Performed by: INTERNAL MEDICINE

## 2017-01-01 PROCEDURE — 87205 SMEAR GRAM STAIN: CPT | Performed by: EMERGENCY MEDICINE

## 2017-01-01 PROCEDURE — 81003 URINALYSIS AUTO W/O SCOPE: CPT | Performed by: INTERNAL MEDICINE

## 2017-01-01 PROCEDURE — 96367 TX/PROPH/DG ADDL SEQ IV INF: CPT | Performed by: EMERGENCY MEDICINE

## 2017-01-01 PROCEDURE — 70450 CT HEAD/BRAIN W/O DYE: CPT

## 2017-01-01 PROCEDURE — 0SRC0J9 REPLACEMENT OF RIGHT KNEE JOINT WITH SYNTHETIC SUBSTITUTE, CEMENTED, OPEN APPROACH: ICD-10-PCS | Performed by: ORTHOPAEDIC SURGERY

## 2017-01-01 PROCEDURE — 94660 CPAP INITIATION&MGMT: CPT

## 2017-01-01 PROCEDURE — 36415 COLL VENOUS BLD VENIPUNCTURE: CPT | Performed by: INTERNAL MEDICINE

## 2017-01-01 PROCEDURE — 85018 HEMOGLOBIN: CPT | Performed by: PHYSICIAN ASSISTANT

## 2017-01-01 PROCEDURE — 97162 PT EVAL MOD COMPLEX 30 MIN: CPT

## 2017-01-01 PROCEDURE — G8979 MOBILITY GOAL STATUS: HCPCS

## 2017-01-01 PROCEDURE — 97530 THERAPEUTIC ACTIVITIES: CPT

## 2017-01-01 PROCEDURE — 36415 COLL VENOUS BLD VENIPUNCTURE: CPT | Performed by: PHYSICIAN ASSISTANT

## 2017-01-01 PROCEDURE — 99283 EMERGENCY DEPT VISIT LOW MDM: CPT | Performed by: EMERGENCY MEDICINE

## 2017-01-01 PROCEDURE — 80048 BASIC METABOLIC PNL TOTAL CA: CPT | Performed by: INTERNAL MEDICINE

## 2017-01-01 PROCEDURE — 74011000250 HC RX REV CODE- 250

## 2017-01-01 PROCEDURE — 36600 WITHDRAWAL OF ARTERIAL BLOOD: CPT

## 2017-01-01 PROCEDURE — 02HV33Z INSERTION OF INFUSION DEVICE INTO SUPERIOR VENA CAVA, PERCUTANEOUS APPROACH: ICD-10-PCS | Performed by: INTERNAL MEDICINE

## 2017-01-01 PROCEDURE — 82533 TOTAL CORTISOL: CPT | Performed by: INTERNAL MEDICINE

## 2017-01-01 PROCEDURE — 87153 DNA/RNA SEQUENCING: CPT | Performed by: INTERNAL MEDICINE

## 2017-01-01 PROCEDURE — 74011000250 HC RX REV CODE- 250: Performed by: INTERNAL MEDICINE

## 2017-01-01 PROCEDURE — 90715 TDAP VACCINE 7 YRS/> IM: CPT | Performed by: EMERGENCY MEDICINE

## 2017-01-01 PROCEDURE — 77030031642 HC BOOT FT ROOKE HFS OSBM -B

## 2017-01-01 PROCEDURE — 74011000258 HC RX REV CODE- 258: Performed by: EMERGENCY MEDICINE

## 2017-01-01 PROCEDURE — 74011250636 HC RX REV CODE- 250/636: Performed by: INTERNAL MEDICINE

## 2017-01-01 PROCEDURE — 65270000029 HC RM PRIVATE

## 2017-01-01 PROCEDURE — 77030007880 HC KT SPN EPDRL BBMI -B: Performed by: ANESTHESIOLOGY

## 2017-01-01 PROCEDURE — 77030013727 HC IRR FAN PULSVC ZIMM -B: Performed by: ORTHOPAEDIC SURGERY

## 2017-01-01 PROCEDURE — 92610 EVALUATE SWALLOWING FUNCTION: CPT

## 2017-01-01 PROCEDURE — 74176 CT ABD & PELVIS W/O CONTRAST: CPT

## 2017-01-01 PROCEDURE — 74011250636 HC RX REV CODE- 250/636: Performed by: ORTHOPAEDIC SURGERY

## 2017-01-01 PROCEDURE — 82570 ASSAY OF URINE CREATININE: CPT | Performed by: INTERNAL MEDICINE

## 2017-01-01 PROCEDURE — 31720 CLEARANCE OF AIRWAYS: CPT

## 2017-01-01 PROCEDURE — 74011636320 HC RX REV CODE- 636/320: Performed by: INTERNAL MEDICINE

## 2017-01-01 PROCEDURE — 81015 MICROSCOPIC EXAM OF URINE: CPT | Performed by: EMERGENCY MEDICINE

## 2017-01-01 PROCEDURE — 85730 THROMBOPLASTIN TIME PARTIAL: CPT | Performed by: INTERNAL MEDICINE

## 2017-01-01 PROCEDURE — 77030019605

## 2017-01-01 PROCEDURE — 77030003665 HC NDL SPN BBMI -A: Performed by: ANESTHESIOLOGY

## 2017-01-01 PROCEDURE — 87641 MR-STAPH DNA AMP PROBE: CPT | Performed by: PHYSICIAN ASSISTANT

## 2017-01-01 PROCEDURE — 85379 FIBRIN DEGRADATION QUANT: CPT | Performed by: INTERNAL MEDICINE

## 2017-01-01 PROCEDURE — 85025 COMPLETE CBC W/AUTO DIFF WBC: CPT | Performed by: INTERNAL MEDICINE

## 2017-01-01 PROCEDURE — 83605 ASSAY OF LACTIC ACID: CPT | Performed by: INTERNAL MEDICINE

## 2017-01-01 PROCEDURE — 80048 BASIC METABOLIC PNL TOTAL CA: CPT | Performed by: PHYSICIAN ASSISTANT

## 2017-01-01 PROCEDURE — 82803 BLOOD GASES ANY COMBINATION: CPT

## 2017-01-01 PROCEDURE — 97116 GAIT TRAINING THERAPY: CPT

## 2017-01-01 PROCEDURE — 85610 PROTHROMBIN TIME: CPT | Performed by: PHYSICIAN ASSISTANT

## 2017-01-01 PROCEDURE — 76060000035 HC ANESTHESIA 2 TO 2.5 HR: Performed by: ORTHOPAEDIC SURGERY

## 2017-01-01 PROCEDURE — 85027 COMPLETE CBC AUTOMATED: CPT | Performed by: INTERNAL MEDICINE

## 2017-01-01 PROCEDURE — 86580 TB INTRADERMAL TEST: CPT | Performed by: INTERNAL MEDICINE

## 2017-01-01 PROCEDURE — 77010033678 HC OXYGEN DAILY

## 2017-01-01 PROCEDURE — 97110 THERAPEUTIC EXERCISES: CPT

## 2017-01-01 PROCEDURE — 84300 ASSAY OF URINE SODIUM: CPT | Performed by: INTERNAL MEDICINE

## 2017-01-01 PROCEDURE — 83735 ASSAY OF MAGNESIUM: CPT | Performed by: INTERNAL MEDICINE

## 2017-01-01 PROCEDURE — 77030020782 HC GWN BAIR PAWS FLX 3M -B: Performed by: ANESTHESIOLOGY

## 2017-01-01 PROCEDURE — 96372 THER/PROPH/DIAG INJ SC/IM: CPT | Performed by: EMERGENCY MEDICINE

## 2017-01-01 PROCEDURE — 71010 XR CHEST PORT: CPT

## 2017-01-01 PROCEDURE — 77030012935 HC DRSG AQUACEL BMS -B

## 2017-01-01 PROCEDURE — 84484 ASSAY OF TROPONIN QUANT: CPT

## 2017-01-01 PROCEDURE — 74011250636 HC RX REV CODE- 250/636

## 2017-01-01 PROCEDURE — 97165 OT EVAL LOW COMPLEX 30 MIN: CPT

## 2017-01-01 PROCEDURE — 77030036688 HC BLNKT CLD THER S2SG -B

## 2017-01-01 PROCEDURE — 65620000000 HC RM CCU GENERAL

## 2017-01-01 PROCEDURE — 86580 TB INTRADERMAL TEST: CPT | Performed by: ORTHOPAEDIC SURGERY

## 2017-01-01 PROCEDURE — 87040 BLOOD CULTURE FOR BACTERIA: CPT | Performed by: EMERGENCY MEDICINE

## 2017-01-01 PROCEDURE — 83880 ASSAY OF NATRIURETIC PEPTIDE: CPT | Performed by: INTERNAL MEDICINE

## 2017-01-01 PROCEDURE — A6258 TRANSPARENT FILM >16<=48 IN: HCPCS

## 2017-01-01 PROCEDURE — G0155 HHCP-SVS OF CSW,EA 15 MIN: HCPCS

## 2017-01-01 PROCEDURE — 77030012890

## 2017-01-01 PROCEDURE — 84146 ASSAY OF PROLACTIN: CPT | Performed by: INTERNAL MEDICINE

## 2017-01-01 PROCEDURE — 80053 COMPREHEN METABOLIC PANEL: CPT | Performed by: EMERGENCY MEDICINE

## 2017-01-01 PROCEDURE — 80053 COMPREHEN METABOLIC PANEL: CPT | Performed by: INTERNAL MEDICINE

## 2017-01-01 PROCEDURE — 96361 HYDRATE IV INFUSION ADD-ON: CPT | Performed by: EMERGENCY MEDICINE

## 2017-01-01 PROCEDURE — 80061 LIPID PANEL: CPT | Performed by: INTERNAL MEDICINE

## 2017-01-01 PROCEDURE — 99283 EMERGENCY DEPT VISIT LOW MDM: CPT

## 2017-01-01 PROCEDURE — 74011250636 HC RX REV CODE- 250/636: Performed by: ANESTHESIOLOGY

## 2017-01-01 PROCEDURE — 36556 INSERT NON-TUNNEL CV CATH: CPT

## 2017-01-01 PROCEDURE — 93880 EXTRACRANIAL BILAT STUDY: CPT

## 2017-01-01 PROCEDURE — 77030019557 HC ELECTRD VES SEAL MEDT -F: Performed by: ORTHOPAEDIC SURGERY

## 2017-01-01 PROCEDURE — 87086 URINE CULTURE/COLONY COUNT: CPT | Performed by: INTERNAL MEDICINE

## 2017-01-01 PROCEDURE — 99233 SBSQ HOSP IP/OBS HIGH 50: CPT | Performed by: INTERNAL MEDICINE

## 2017-01-01 PROCEDURE — 77030012939 HC DRSG HYDRCOIL BMS -A

## 2017-01-01 PROCEDURE — 87040 BLOOD CULTURE FOR BACTERIA: CPT | Performed by: INTERNAL MEDICINE

## 2017-01-01 PROCEDURE — 87077 CULTURE AEROBIC IDENTIFY: CPT | Performed by: INTERNAL MEDICINE

## 2017-01-01 PROCEDURE — 87077 CULTURE AEROBIC IDENTIFY: CPT | Performed by: EMERGENCY MEDICINE

## 2017-01-01 PROCEDURE — 74011000250 HC RX REV CODE- 250: Performed by: EMERGENCY MEDICINE

## 2017-01-01 PROCEDURE — 71010 XR CHEST SNGL V: CPT

## 2017-01-01 PROCEDURE — 77030011640 HC PAD GRND REM COVD -A: Performed by: ORTHOPAEDIC SURGERY

## 2017-01-01 PROCEDURE — 77030002933 HC SUT MCRYL J&J -A: Performed by: ORTHOPAEDIC SURGERY

## 2017-01-01 PROCEDURE — 97532 HC OT COGNITIVE SKILL DEV 15 MIN: CPT

## 2017-01-01 PROCEDURE — 84100 ASSAY OF PHOSPHORUS: CPT | Performed by: INTERNAL MEDICINE

## 2017-01-01 PROCEDURE — 99232 SBSQ HOSP IP/OBS MODERATE 35: CPT | Performed by: INTERNAL MEDICINE

## 2017-01-01 PROCEDURE — 97161 PT EVAL LOW COMPLEX 20 MIN: CPT

## 2017-01-01 PROCEDURE — A4247 BETADINE/IODINE SWABS/WIPES: HCPCS

## 2017-01-01 PROCEDURE — 65660000000 HC RM CCU STEPDOWN

## 2017-01-01 PROCEDURE — 77030035643 HC BLD SAW OSC PRECIS STRY -C: Performed by: ORTHOPAEDIC SURGERY

## 2017-01-01 PROCEDURE — 87641 MR-STAPH DNA AMP PROBE: CPT | Performed by: EMERGENCY MEDICINE

## 2017-01-01 PROCEDURE — 86900 BLOOD TYPING SEROLOGIC ABO: CPT | Performed by: PHYSICIAN ASSISTANT

## 2017-01-01 PROCEDURE — 92526 ORAL FUNCTION THERAPY: CPT

## 2017-01-01 PROCEDURE — 76010000171 HC OR TIME 2 TO 2.5 HR INTENSV-TIER 1: Performed by: ORTHOPAEDIC SURGERY

## 2017-01-01 PROCEDURE — 80076 HEPATIC FUNCTION PANEL: CPT | Performed by: INTERNAL MEDICINE

## 2017-01-01 PROCEDURE — 90471 IMMUNIZATION ADMIN: CPT | Performed by: EMERGENCY MEDICINE

## 2017-01-01 PROCEDURE — 74011000302 HC RX REV CODE- 302: Performed by: INTERNAL MEDICINE

## 2017-01-01 PROCEDURE — 96365 THER/PROPH/DIAG IV INF INIT: CPT | Performed by: EMERGENCY MEDICINE

## 2017-01-01 PROCEDURE — 77030003602 HC NDL NRV BLK BBMI -B: Performed by: ANESTHESIOLOGY

## 2017-01-01 PROCEDURE — 74011250637 HC RX REV CODE- 250/637: Performed by: PHYSICIAN ASSISTANT

## 2017-01-01 PROCEDURE — 99285 EMERGENCY DEPT VISIT HI MDM: CPT | Performed by: EMERGENCY MEDICINE

## 2017-01-01 PROCEDURE — 77030034849

## 2017-01-01 PROCEDURE — 87804 INFLUENZA ASSAY W/OPTIC: CPT | Performed by: EMERGENCY MEDICINE

## 2017-01-01 PROCEDURE — G8978 MOBILITY CURRENT STATUS: HCPCS

## 2017-01-01 PROCEDURE — 84484 ASSAY OF TROPONIN QUANT: CPT | Performed by: EMERGENCY MEDICINE

## 2017-01-01 PROCEDURE — 97166 OT EVAL MOD COMPLEX 45 MIN: CPT

## 2017-01-01 PROCEDURE — 74011000250 HC RX REV CODE- 250: Performed by: ORTHOPAEDIC SURGERY

## 2017-01-01 PROCEDURE — G8980 MOBILITY D/C STATUS: HCPCS

## 2017-01-01 PROCEDURE — 93005 ELECTROCARDIOGRAM TRACING: CPT | Performed by: EMERGENCY MEDICINE

## 2017-01-01 PROCEDURE — G0151 HHCP-SERV OF PT,EA 15 MIN: HCPCS

## 2017-01-01 PROCEDURE — 85025 COMPLETE CBC W/AUTO DIFF WBC: CPT | Performed by: EMERGENCY MEDICINE

## 2017-01-01 PROCEDURE — 85730 THROMBOPLASTIN TIME PARTIAL: CPT | Performed by: PHYSICIAN ASSISTANT

## 2017-01-01 PROCEDURE — 94640 AIRWAY INHALATION TREATMENT: CPT

## 2017-01-01 PROCEDURE — 84443 ASSAY THYROID STIM HORMONE: CPT | Performed by: INTERNAL MEDICINE

## 2017-01-01 PROCEDURE — 87186 SC STD MICRODIL/AGAR DIL: CPT | Performed by: INTERNAL MEDICINE

## 2017-01-01 PROCEDURE — 400013 HH SOC

## 2017-01-01 PROCEDURE — 74011000302 HC RX REV CODE- 302: Performed by: ORTHOPAEDIC SURGERY

## 2017-01-01 PROCEDURE — 76210000016 HC OR PH I REC 1 TO 1.5 HR: Performed by: ORTHOPAEDIC SURGERY

## 2017-01-01 PROCEDURE — 87205 SMEAR GRAM STAIN: CPT | Performed by: INTERNAL MEDICINE

## 2017-01-01 PROCEDURE — 77030002966 HC SUT PDS J&J -A: Performed by: ORTHOPAEDIC SURGERY

## 2017-01-01 PROCEDURE — 3E00X4Z INTRODUCTION OF SERUM, TOXOID AND VACCINE INTO SKIN AND MUCOUS MEMBRANES, EXTERNAL APPROACH: ICD-10-PCS | Performed by: ORTHOPAEDIC SURGERY

## 2017-01-01 PROCEDURE — 87186 SC STD MICRODIL/AGAR DIL: CPT | Performed by: EMERGENCY MEDICINE

## 2017-01-01 PROCEDURE — 87070 CULTURE OTHR SPECIMN AEROBIC: CPT | Performed by: INTERNAL MEDICINE

## 2017-01-01 PROCEDURE — 71260 CT THORAX DX C+: CPT

## 2017-01-01 PROCEDURE — 83690 ASSAY OF LIPASE: CPT | Performed by: EMERGENCY MEDICINE

## 2017-01-01 PROCEDURE — 80202 ASSAY OF VANCOMYCIN: CPT | Performed by: INTERNAL MEDICINE

## 2017-01-01 PROCEDURE — 77030018836 HC SOL IRR NACL ICUM -A: Performed by: ORTHOPAEDIC SURGERY

## 2017-01-01 PROCEDURE — 77030008467 HC STPLR SKN COVD -B: Performed by: ORTHOPAEDIC SURGERY

## 2017-01-01 PROCEDURE — C1751 CATH, INF, PER/CENT/MIDLINE: HCPCS

## 2017-01-01 PROCEDURE — 87493 C DIFF AMPLIFIED PROBE: CPT | Performed by: INTERNAL MEDICINE

## 2017-01-01 PROCEDURE — 74011636320 HC RX REV CODE- 636/320: Performed by: EMERGENCY MEDICINE

## 2017-01-01 PROCEDURE — 77030012935 HC DRSG AQUACEL BMS -B: Performed by: ORTHOPAEDIC SURGERY

## 2017-01-01 PROCEDURE — 83036 HEMOGLOBIN GLYCOSYLATED A1C: CPT | Performed by: INTERNAL MEDICINE

## 2017-01-01 PROCEDURE — C1713 ANCHOR/SCREW BN/BN,TIS/BN: HCPCS | Performed by: ORTHOPAEDIC SURGERY

## 2017-01-01 PROCEDURE — P9047 ALBUMIN (HUMAN), 25%, 50ML: HCPCS | Performed by: INTERNAL MEDICINE

## 2017-01-01 PROCEDURE — 77030031139 HC SUT VCRL2 J&J -A: Performed by: ORTHOPAEDIC SURGERY

## 2017-01-01 PROCEDURE — 77030034849: Performed by: ORTHOPAEDIC SURGERY

## 2017-01-01 PROCEDURE — 77030008771 HC TU NG SALEM SUMP -A

## 2017-01-01 PROCEDURE — 73562 X-RAY EXAM OF KNEE 3: CPT

## 2017-01-01 PROCEDURE — 77030034850

## 2017-01-01 PROCEDURE — C9113 INJ PANTOPRAZOLE SODIUM, VIA: HCPCS | Performed by: INTERNAL MEDICINE

## 2017-01-01 PROCEDURE — C1776 JOINT DEVICE (IMPLANTABLE): HCPCS | Performed by: ORTHOPAEDIC SURGERY

## 2017-01-01 PROCEDURE — 77030032490 HC SLV COMPR SCD KNE COVD -B

## 2017-01-01 PROCEDURE — 93306 TTE W/DOPPLER COMPLETE: CPT

## 2017-01-01 PROCEDURE — 84439 ASSAY OF FREE THYROXINE: CPT | Performed by: INTERNAL MEDICINE

## 2017-01-01 DEVICE — (D)CEMENT BNE HV R 40GM -- DUPE USE ITEM 353850: Type: IMPLANTABLE DEVICE | Site: KNEE | Status: FUNCTIONAL

## 2017-01-01 RX ORDER — TRAZODONE HYDROCHLORIDE 50 MG/1
150 TABLET ORAL
Status: DISCONTINUED | OUTPATIENT
Start: 2017-01-01 | End: 2017-01-01

## 2017-01-01 RX ORDER — PANTOPRAZOLE SODIUM 40 MG/1
40 TABLET, DELAYED RELEASE ORAL
Status: DISCONTINUED | OUTPATIENT
Start: 2017-01-01 | End: 2017-01-01 | Stop reason: SDUPTHER

## 2017-01-01 RX ORDER — CLOPIDOGREL BISULFATE 75 MG/1
75 TABLET ORAL DAILY
Status: DISCONTINUED | OUTPATIENT
Start: 2017-01-01 | End: 2017-01-01 | Stop reason: HOSPADM

## 2017-01-01 RX ORDER — SENNOSIDES 8.6 MG/1
1 TABLET ORAL DAILY
COMMUNITY

## 2017-01-01 RX ORDER — ONDANSETRON 2 MG/ML
4 INJECTION INTRAMUSCULAR; INTRAVENOUS
Status: DISCONTINUED | OUTPATIENT
Start: 2017-01-01 | End: 2017-01-01 | Stop reason: HOSPADM

## 2017-01-01 RX ORDER — METOCLOPRAMIDE 5 MG/1
5 TABLET ORAL
COMMUNITY

## 2017-01-01 RX ORDER — CEFAZOLIN SODIUM IN 0.9 % NACL 2 G/50 ML
2 INTRAVENOUS SOLUTION, PIGGYBACK (ML) INTRAVENOUS ONCE
Status: COMPLETED | OUTPATIENT
Start: 2017-01-01 | End: 2017-01-01

## 2017-01-01 RX ORDER — FAMOTIDINE 20 MG/1
20 TABLET, FILM COATED ORAL ONCE
Status: DISCONTINUED | OUTPATIENT
Start: 2017-01-01 | End: 2017-01-01 | Stop reason: HOSPADM

## 2017-01-01 RX ORDER — METOPROLOL SUCCINATE 25 MG/1
12.5 TABLET, EXTENDED RELEASE ORAL DAILY
Status: DISCONTINUED | OUTPATIENT
Start: 2017-01-01 | End: 2017-01-01 | Stop reason: HOSPADM

## 2017-01-01 RX ORDER — GABAPENTIN 300 MG/1
600 CAPSULE ORAL DAILY
Status: DISCONTINUED | OUTPATIENT
Start: 2017-01-01 | End: 2017-01-01

## 2017-01-01 RX ORDER — OXYCODONE HYDROCHLORIDE 5 MG/1
5 TABLET ORAL
Status: DISCONTINUED | OUTPATIENT
Start: 2017-01-01 | End: 2017-01-01 | Stop reason: HOSPADM

## 2017-01-01 RX ORDER — NALOXONE HYDROCHLORIDE 0.4 MG/ML
0.4 INJECTION, SOLUTION INTRAMUSCULAR; INTRAVENOUS; SUBCUTANEOUS ONCE
Status: DISCONTINUED | OUTPATIENT
Start: 2017-01-01 | End: 2017-01-01

## 2017-01-01 RX ORDER — PROPOFOL 10 MG/ML
INJECTION, EMULSION INTRAVENOUS AS NEEDED
Status: DISCONTINUED | OUTPATIENT
Start: 2017-01-01 | End: 2017-01-01 | Stop reason: HOSPADM

## 2017-01-01 RX ORDER — ONDANSETRON 2 MG/ML
INJECTION INTRAMUSCULAR; INTRAVENOUS AS NEEDED
Status: DISCONTINUED | OUTPATIENT
Start: 2017-01-01 | End: 2017-01-01 | Stop reason: HOSPADM

## 2017-01-01 RX ORDER — ALBUMIN HUMAN 250 G/1000ML
12.5 SOLUTION INTRAVENOUS ONCE
Status: COMPLETED | OUTPATIENT
Start: 2017-01-01 | End: 2017-01-01

## 2017-01-01 RX ORDER — SODIUM CHLORIDE 0.9 % (FLUSH) 0.9 %
5-10 SYRINGE (ML) INJECTION EVERY 8 HOURS
Status: DISCONTINUED | OUTPATIENT
Start: 2017-01-01 | End: 2017-01-01 | Stop reason: HOSPADM

## 2017-01-01 RX ORDER — CYCLOBENZAPRINE HCL 10 MG
10 TABLET ORAL
Status: DISCONTINUED | OUTPATIENT
Start: 2017-01-01 | End: 2017-01-01 | Stop reason: HOSPADM

## 2017-01-01 RX ORDER — DULOXETIN HYDROCHLORIDE 60 MG/1
60 CAPSULE, DELAYED RELEASE ORAL DAILY
Status: DISCONTINUED | OUTPATIENT
Start: 2017-01-01 | End: 2017-01-01

## 2017-01-01 RX ORDER — ZOLPIDEM TARTRATE 5 MG/1
TABLET ORAL
COMMUNITY
End: 2017-01-01

## 2017-01-01 RX ORDER — TAMSULOSIN HYDROCHLORIDE 0.4 MG/1
0.4 CAPSULE ORAL
Status: DISCONTINUED | OUTPATIENT
Start: 2017-01-01 | End: 2017-01-01 | Stop reason: HOSPADM

## 2017-01-01 RX ORDER — FINASTERIDE 5 MG/1
5 TABLET, FILM COATED ORAL
Status: DISCONTINUED | OUTPATIENT
Start: 2017-01-01 | End: 2017-01-01 | Stop reason: HOSPADM

## 2017-01-01 RX ORDER — SODIUM CHLORIDE 0.9 % (FLUSH) 0.9 %
5-10 SYRINGE (ML) INJECTION AS NEEDED
Status: DISCONTINUED | OUTPATIENT
Start: 2017-01-01 | End: 2017-01-01 | Stop reason: HOSPADM

## 2017-01-01 RX ORDER — GABAPENTIN 300 MG/1
600 CAPSULE ORAL DAILY
Status: DISCONTINUED | OUTPATIENT
Start: 2017-01-01 | End: 2017-01-01 | Stop reason: HOSPADM

## 2017-01-01 RX ORDER — MIDAZOLAM HYDROCHLORIDE 1 MG/ML
2 INJECTION, SOLUTION INTRAMUSCULAR; INTRAVENOUS
Status: DISCONTINUED | OUTPATIENT
Start: 2017-01-01 | End: 2017-01-01 | Stop reason: HOSPADM

## 2017-01-01 RX ORDER — DIAZEPAM 5 MG/1
5 TABLET ORAL
Status: DISCONTINUED | OUTPATIENT
Start: 2017-01-01 | End: 2017-01-01 | Stop reason: HOSPADM

## 2017-01-01 RX ORDER — ONDANSETRON 2 MG/ML
4 INJECTION INTRAMUSCULAR; INTRAVENOUS
Status: COMPLETED | OUTPATIENT
Start: 2017-01-01 | End: 2017-01-01

## 2017-01-01 RX ORDER — DULOXETIN HYDROCHLORIDE 60 MG/1
60 CAPSULE, DELAYED RELEASE ORAL
Status: DISCONTINUED | OUTPATIENT
Start: 2017-01-01 | End: 2017-01-01 | Stop reason: HOSPADM

## 2017-01-01 RX ORDER — GABAPENTIN 300 MG/1
300 CAPSULE ORAL DAILY
Status: DISCONTINUED | OUTPATIENT
Start: 2017-01-01 | End: 2017-01-01

## 2017-01-01 RX ORDER — LANOLIN ALCOHOL/MO/W.PET/CERES
1000 CREAM (GRAM) TOPICAL DAILY
Status: DISCONTINUED | OUTPATIENT
Start: 2017-01-01 | End: 2017-01-01

## 2017-01-01 RX ORDER — SODIUM CHLORIDE 0.9 % (FLUSH) 0.9 %
5-10 SYRINGE (ML) INJECTION EVERY 8 HOURS
Status: DISCONTINUED | OUTPATIENT
Start: 2017-01-01 | End: 2017-01-01

## 2017-01-01 RX ORDER — LIDOCAINE HYDROCHLORIDE 10 MG/ML
0.1 INJECTION INFILTRATION; PERINEURAL AS NEEDED
Status: DISCONTINUED | OUTPATIENT
Start: 2017-01-01 | End: 2017-01-01 | Stop reason: HOSPADM

## 2017-01-01 RX ORDER — ATORVASTATIN CALCIUM 40 MG/1
40 TABLET, FILM COATED ORAL
Status: DISCONTINUED | OUTPATIENT
Start: 2017-01-01 | End: 2017-01-01

## 2017-01-01 RX ORDER — SODIUM CHLORIDE 0.9 % (FLUSH) 0.9 %
10 SYRINGE (ML) INJECTION
Status: COMPLETED | OUTPATIENT
Start: 2017-01-01 | End: 2017-01-01

## 2017-01-01 RX ORDER — OXYCODONE HYDROCHLORIDE 10 MG/1
10 TABLET ORAL
Qty: 60 TAB | Refills: 0 | Status: ON HOLD | OUTPATIENT
Start: 2017-01-01 | End: 2017-01-01

## 2017-01-01 RX ORDER — FENTANYL CITRATE 50 UG/ML
25 INJECTION, SOLUTION INTRAMUSCULAR; INTRAVENOUS ONCE
Status: COMPLETED | OUTPATIENT
Start: 2017-01-01 | End: 2017-01-01

## 2017-01-01 RX ORDER — ACETAMINOPHEN 325 MG/1
650 TABLET ORAL
Status: DISCONTINUED | OUTPATIENT
Start: 2017-01-01 | End: 2017-01-01 | Stop reason: HOSPADM

## 2017-01-01 RX ORDER — SODIUM CHLORIDE 9 MG/ML
100 INJECTION, SOLUTION INTRAVENOUS CONTINUOUS
Status: DISCONTINUED | OUTPATIENT
Start: 2017-01-01 | End: 2017-01-01 | Stop reason: HOSPADM

## 2017-01-01 RX ORDER — OXYCODONE AND ACETAMINOPHEN 5; 325 MG/1; MG/1
1 TABLET ORAL AS NEEDED
Status: CANCELLED | OUTPATIENT
Start: 2017-01-01

## 2017-01-01 RX ORDER — DIAZEPAM 5 MG/1
5 TABLET ORAL
Status: DISCONTINUED | OUTPATIENT
Start: 2017-01-01 | End: 2017-01-01

## 2017-01-01 RX ORDER — DULOXETIN HYDROCHLORIDE 60 MG/1
60 CAPSULE, DELAYED RELEASE ORAL DAILY
Status: DISCONTINUED | OUTPATIENT
Start: 2017-01-01 | End: 2017-01-01 | Stop reason: HOSPADM

## 2017-01-01 RX ORDER — ACETAMINOPHEN 10 MG/ML
1000 INJECTION, SOLUTION INTRAVENOUS ONCE
Status: COMPLETED | OUTPATIENT
Start: 2017-01-01 | End: 2017-01-01

## 2017-01-01 RX ORDER — CYCLOBENZAPRINE HCL 10 MG
TABLET ORAL
COMMUNITY

## 2017-01-01 RX ORDER — SODIUM CHLORIDE, SODIUM LACTATE, POTASSIUM CHLORIDE, CALCIUM CHLORIDE 600; 310; 30; 20 MG/100ML; MG/100ML; MG/100ML; MG/100ML
100 INJECTION, SOLUTION INTRAVENOUS CONTINUOUS
Status: DISCONTINUED | OUTPATIENT
Start: 2017-01-01 | End: 2017-01-01 | Stop reason: HOSPADM

## 2017-01-01 RX ORDER — SODIUM CHLORIDE 9 MG/ML
50 INJECTION, SOLUTION INTRAVENOUS CONTINUOUS
Status: DISCONTINUED | OUTPATIENT
Start: 2017-01-01 | End: 2017-01-01 | Stop reason: HOSPADM

## 2017-01-01 RX ORDER — BUPIVACAINE HYDROCHLORIDE 7.5 MG/ML
INJECTION, SOLUTION INTRASPINAL AS NEEDED
Status: DISCONTINUED | OUTPATIENT
Start: 2017-01-01 | End: 2017-01-01 | Stop reason: HOSPADM

## 2017-01-01 RX ORDER — TAMSULOSIN HYDROCHLORIDE 0.4 MG/1
0.4 CAPSULE ORAL
Status: DISCONTINUED | OUTPATIENT
Start: 2017-01-01 | End: 2017-01-01

## 2017-01-01 RX ORDER — TRAZODONE HYDROCHLORIDE 50 MG/1
150 TABLET ORAL
Status: DISCONTINUED | OUTPATIENT
Start: 2017-01-01 | End: 2017-01-01 | Stop reason: HOSPADM

## 2017-01-01 RX ORDER — LEVOTHYROXINE SODIUM 100 UG/1
150 TABLET ORAL
Status: DISCONTINUED | OUTPATIENT
Start: 2017-01-01 | End: 2017-01-01 | Stop reason: HOSPADM

## 2017-01-01 RX ORDER — AMIODARONE HYDROCHLORIDE 200 MG/1
200 TABLET ORAL DAILY
Status: DISCONTINUED | OUTPATIENT
Start: 2017-01-01 | End: 2017-01-01

## 2017-01-01 RX ORDER — DUTASTERIDE 0.5 MG/1
0.5 CAPSULE, LIQUID FILLED ORAL DAILY
Status: DISCONTINUED | OUTPATIENT
Start: 2017-01-01 | End: 2017-01-01

## 2017-01-01 RX ORDER — TRANEXAMIC ACID 100 MG/ML
INJECTION, SOLUTION INTRAVENOUS AS NEEDED
Status: DISCONTINUED | OUTPATIENT
Start: 2017-01-01 | End: 2017-01-01 | Stop reason: HOSPADM

## 2017-01-01 RX ORDER — OXYCODONE HYDROCHLORIDE 5 MG/1
10 TABLET ORAL
Status: DISCONTINUED | OUTPATIENT
Start: 2017-01-01 | End: 2017-01-01 | Stop reason: HOSPADM

## 2017-01-01 RX ORDER — ASPIRIN 81 MG/1
81 TABLET ORAL DAILY
Status: DISCONTINUED | OUTPATIENT
Start: 2017-01-01 | End: 2017-01-01

## 2017-01-01 RX ORDER — LIDOCAINE HYDROCHLORIDE 20 MG/ML
INJECTION, SOLUTION EPIDURAL; INFILTRATION; INTRACAUDAL; PERINEURAL AS NEEDED
Status: DISCONTINUED | OUTPATIENT
Start: 2017-01-01 | End: 2017-01-01 | Stop reason: HOSPADM

## 2017-01-01 RX ORDER — SODIUM POLYSTYRENE SULFONATE 15 G/60ML
15 SUSPENSION ORAL; RECTAL
Status: COMPLETED | OUTPATIENT
Start: 2017-01-01 | End: 2017-01-01

## 2017-01-01 RX ORDER — POTASSIUM CHLORIDE 20 MEQ/1
40 TABLET, EXTENDED RELEASE ORAL DAILY
Status: DISCONTINUED | OUTPATIENT
Start: 2017-01-01 | End: 2017-01-01 | Stop reason: HOSPADM

## 2017-01-01 RX ORDER — FUROSEMIDE 10 MG/ML
20 INJECTION INTRAMUSCULAR; INTRAVENOUS ONCE
Status: COMPLETED | OUTPATIENT
Start: 2017-01-01 | End: 2017-01-01

## 2017-01-01 RX ORDER — ROPIVACAINE HYDROCHLORIDE 2 MG/ML
INJECTION, SOLUTION EPIDURAL; INFILTRATION; PERINEURAL AS NEEDED
Status: DISCONTINUED | OUTPATIENT
Start: 2017-01-01 | End: 2017-01-01 | Stop reason: HOSPADM

## 2017-01-01 RX ORDER — DEXAMETHASONE SODIUM PHOSPHATE 4 MG/ML
INJECTION, SOLUTION INTRA-ARTICULAR; INTRALESIONAL; INTRAMUSCULAR; INTRAVENOUS; SOFT TISSUE AS NEEDED
Status: DISCONTINUED | OUTPATIENT
Start: 2017-01-01 | End: 2017-01-01 | Stop reason: HOSPADM

## 2017-01-01 RX ORDER — VANCOMYCIN 1.75 GRAM/500 ML IN 0.9 % SODIUM CHLORIDE INTRAVENOUS
1750 ONCE
Status: COMPLETED | OUTPATIENT
Start: 2017-01-01 | End: 2017-01-01

## 2017-01-01 RX ORDER — BUPIVACAINE HYDROCHLORIDE 2.5 MG/ML
INJECTION, SOLUTION EPIDURAL; INFILTRATION; INTRACAUDAL AS NEEDED
Status: DISCONTINUED | OUTPATIENT
Start: 2017-01-01 | End: 2017-01-01 | Stop reason: HOSPADM

## 2017-01-01 RX ORDER — NALOXONE HYDROCHLORIDE 0.4 MG/ML
0.4 INJECTION, SOLUTION INTRAMUSCULAR; INTRAVENOUS; SUBCUTANEOUS ONCE
Status: COMPLETED | OUTPATIENT
Start: 2017-01-01 | End: 2017-01-01

## 2017-01-01 RX ORDER — FENTANYL CITRATE 50 UG/ML
INJECTION, SOLUTION INTRAMUSCULAR; INTRAVENOUS AS NEEDED
Status: DISCONTINUED | OUTPATIENT
Start: 2017-01-01 | End: 2017-01-01 | Stop reason: HOSPADM

## 2017-01-01 RX ORDER — NALOXONE HYDROCHLORIDE 0.4 MG/ML
0.4 INJECTION, SOLUTION INTRAMUSCULAR; INTRAVENOUS; SUBCUTANEOUS ONCE
Status: ACTIVE | OUTPATIENT
Start: 2017-01-01 | End: 2017-11-27

## 2017-01-01 RX ORDER — ALBUTEROL SULFATE 0.83 MG/ML
2.5 SOLUTION RESPIRATORY (INHALATION)
Status: DISCONTINUED | OUTPATIENT
Start: 2017-01-01 | End: 2017-01-01 | Stop reason: HOSPADM

## 2017-01-01 RX ORDER — PANTOPRAZOLE SODIUM 40 MG/1
40 TABLET, DELAYED RELEASE ORAL
Status: DISCONTINUED | OUTPATIENT
Start: 2017-01-01 | End: 2017-01-01 | Stop reason: HOSPADM

## 2017-01-01 RX ORDER — ACETAMINOPHEN 500 MG
1000 TABLET ORAL EVERY 6 HOURS
Status: DISCONTINUED | OUTPATIENT
Start: 2017-01-01 | End: 2017-01-01 | Stop reason: HOSPADM

## 2017-01-01 RX ORDER — SODIUM CHLORIDE 0.9 % (FLUSH) 0.9 %
5-10 SYRINGE (ML) INJECTION AS NEEDED
Status: CANCELLED | OUTPATIENT
Start: 2017-01-01

## 2017-01-01 RX ORDER — GUAIFENESIN 100 MG/5ML
400 SOLUTION ORAL EVERY 8 HOURS
Status: DISCONTINUED | OUTPATIENT
Start: 2017-01-01 | End: 2017-01-01

## 2017-01-01 RX ORDER — LEVOTHYROXINE SODIUM 75 UG/1
150 TABLET ORAL
Status: DISCONTINUED | OUTPATIENT
Start: 2017-01-01 | End: 2017-01-01 | Stop reason: HOSPADM

## 2017-01-01 RX ORDER — HYDROMORPHONE HYDROCHLORIDE 1 MG/ML
1 INJECTION, SOLUTION INTRAMUSCULAR; INTRAVENOUS; SUBCUTANEOUS
Status: DISCONTINUED | OUTPATIENT
Start: 2017-01-01 | End: 2017-01-01 | Stop reason: HOSPADM

## 2017-01-01 RX ORDER — ASPIRIN 325 MG
325 TABLET ORAL DAILY
Status: DISCONTINUED | OUTPATIENT
Start: 2017-01-01 | End: 2017-01-01 | Stop reason: HOSPADM

## 2017-01-01 RX ORDER — NEOMYCIN AND POLYMYXIN B SULFATES 40; 200000 MG/ML; [USP'U]/ML
SOLUTION IRRIGATION AS NEEDED
Status: DISCONTINUED | OUTPATIENT
Start: 2017-01-01 | End: 2017-01-01 | Stop reason: HOSPADM

## 2017-01-01 RX ORDER — DOCUSATE SODIUM 100 MG/1
100 CAPSULE, LIQUID FILLED ORAL 2 TIMES DAILY
COMMUNITY

## 2017-01-01 RX ORDER — DEXAMETHASONE SODIUM PHOSPHATE 100 MG/10ML
10 INJECTION INTRAMUSCULAR; INTRAVENOUS ONCE
Status: DISCONTINUED | OUTPATIENT
Start: 2017-01-01 | End: 2017-01-01 | Stop reason: HOSPADM

## 2017-01-01 RX ORDER — DIPHENHYDRAMINE HYDROCHLORIDE 50 MG/ML
12.5 INJECTION, SOLUTION INTRAMUSCULAR; INTRAVENOUS ONCE
Status: CANCELLED | OUTPATIENT
Start: 2017-01-01 | End: 2017-01-01

## 2017-01-01 RX ORDER — BUPIVACAINE HYDROCHLORIDE 5 MG/ML
INJECTION, SOLUTION EPIDURAL; INTRACAUDAL AS NEEDED
Status: DISCONTINUED | OUTPATIENT
Start: 2017-01-01 | End: 2017-01-01 | Stop reason: HOSPADM

## 2017-01-01 RX ORDER — SODIUM CHLORIDE, SODIUM LACTATE, POTASSIUM CHLORIDE, CALCIUM CHLORIDE 600; 310; 30; 20 MG/100ML; MG/100ML; MG/100ML; MG/100ML
100 INJECTION, SOLUTION INTRAVENOUS CONTINUOUS
Status: CANCELLED | OUTPATIENT
Start: 2017-01-01

## 2017-01-01 RX ORDER — ASPIRIN 325 MG
325 TABLET, DELAYED RELEASE (ENTERIC COATED) ORAL EVERY 12 HOURS
Status: DISCONTINUED | OUTPATIENT
Start: 2017-01-01 | End: 2017-01-01 | Stop reason: HOSPADM

## 2017-01-01 RX ORDER — DUTASTERIDE 0.5 MG/1
0.5 CAPSULE, LIQUID FILLED ORAL DAILY
COMMUNITY

## 2017-01-01 RX ORDER — DIPHENHYDRAMINE HCL 25 MG
25 CAPSULE ORAL
Status: DISCONTINUED | OUTPATIENT
Start: 2017-01-01 | End: 2017-01-01 | Stop reason: HOSPADM

## 2017-01-01 RX ORDER — GUAIFENESIN 100 MG/5ML
81 LIQUID (ML) ORAL DAILY
COMMUNITY
End: 2017-01-01

## 2017-01-01 RX ORDER — ZOLPIDEM TARTRATE 5 MG/1
5 TABLET ORAL
Status: DISCONTINUED | OUTPATIENT
Start: 2017-01-01 | End: 2017-01-01 | Stop reason: HOSPADM

## 2017-01-01 RX ORDER — HYDROMORPHONE HYDROCHLORIDE 2 MG/ML
0.5 INJECTION, SOLUTION INTRAMUSCULAR; INTRAVENOUS; SUBCUTANEOUS
Status: CANCELLED | OUTPATIENT
Start: 2017-01-01

## 2017-01-01 RX ORDER — METOCLOPRAMIDE 5 MG/1
5 TABLET ORAL
COMMUNITY
End: 2017-01-01

## 2017-01-01 RX ORDER — SODIUM CHLORIDE 9 MG/ML
200 INJECTION, SOLUTION INTRAVENOUS ONCE
Status: COMPLETED | OUTPATIENT
Start: 2017-01-01 | End: 2017-01-01

## 2017-01-01 RX ORDER — CHOLECALCIFEROL (VITAMIN D3) 125 MCG
220 CAPSULE ORAL AS NEEDED
COMMUNITY
End: 2017-01-01

## 2017-01-01 RX ORDER — MORPHINE SULFATE 10 MG/ML
2 INJECTION, SOLUTION INTRAMUSCULAR; INTRAVENOUS
Status: DISCONTINUED | OUTPATIENT
Start: 2017-01-01 | End: 2017-01-01 | Stop reason: HOSPADM

## 2017-01-01 RX ORDER — SODIUM CHLORIDE 9 MG/ML
1000 INJECTION, SOLUTION INTRAVENOUS ONCE
Status: COMPLETED | OUTPATIENT
Start: 2017-01-01 | End: 2017-01-01

## 2017-01-01 RX ORDER — FINASTERIDE 5 MG/1
5 TABLET, FILM COATED ORAL
Status: DISCONTINUED | OUTPATIENT
Start: 2017-01-01 | End: 2017-01-01

## 2017-01-01 RX ORDER — LEVOFLOXACIN 5 MG/ML
750 INJECTION, SOLUTION INTRAVENOUS EVERY 24 HOURS
Status: DISCONTINUED | OUTPATIENT
Start: 2017-01-01 | End: 2017-01-01

## 2017-01-01 RX ORDER — LEVOFLOXACIN 500 MG/1
500 TABLET, FILM COATED ORAL DAILY
Qty: 4 TAB | Refills: 0 | Status: SHIPPED | OUTPATIENT
Start: 2017-01-01 | End: 2017-01-01

## 2017-01-01 RX ORDER — KETOROLAC TROMETHAMINE 30 MG/ML
INJECTION, SOLUTION INTRAMUSCULAR; INTRAVENOUS AS NEEDED
Status: DISCONTINUED | OUTPATIENT
Start: 2017-01-01 | End: 2017-01-01 | Stop reason: HOSPADM

## 2017-01-01 RX ORDER — DIPHENHYDRAMINE HYDROCHLORIDE 50 MG/ML
INJECTION, SOLUTION INTRAMUSCULAR; INTRAVENOUS AS NEEDED
Status: DISCONTINUED | OUTPATIENT
Start: 2017-01-01 | End: 2017-01-01 | Stop reason: HOSPADM

## 2017-01-01 RX ORDER — NALOXONE HYDROCHLORIDE 0.4 MG/ML
.2-.4 INJECTION, SOLUTION INTRAMUSCULAR; INTRAVENOUS; SUBCUTANEOUS
Status: DISCONTINUED | OUTPATIENT
Start: 2017-01-01 | End: 2017-01-01 | Stop reason: HOSPADM

## 2017-01-01 RX ORDER — SODIUM CHLORIDE 9 MG/ML
50 INJECTION, SOLUTION INTRAVENOUS CONTINUOUS
Status: DISCONTINUED | OUTPATIENT
Start: 2017-01-01 | End: 2017-01-01

## 2017-01-01 RX ORDER — MORPHINE SULFATE 10 MG/ML
INJECTION, SOLUTION INTRAMUSCULAR; INTRAVENOUS AS NEEDED
Status: DISCONTINUED | OUTPATIENT
Start: 2017-01-01 | End: 2017-01-01 | Stop reason: HOSPADM

## 2017-01-01 RX ORDER — TRAZODONE HYDROCHLORIDE 50 MG/1
50 TABLET ORAL
Status: DISCONTINUED | OUTPATIENT
Start: 2017-01-01 | End: 2017-01-01 | Stop reason: HOSPADM

## 2017-01-01 RX ORDER — VANCOMYCIN HYDROCHLORIDE
1250 EVERY 12 HOURS
Status: DISCONTINUED | OUTPATIENT
Start: 2017-01-01 | End: 2017-01-01

## 2017-01-01 RX ORDER — MIDAZOLAM HYDROCHLORIDE 1 MG/ML
2 INJECTION, SOLUTION INTRAMUSCULAR; INTRAVENOUS ONCE
Status: DISCONTINUED | OUTPATIENT
Start: 2017-01-01 | End: 2017-01-01 | Stop reason: HOSPADM

## 2017-01-01 RX ORDER — OXYCODONE HYDROCHLORIDE 5 MG/1
5 TABLET ORAL
Status: DISCONTINUED | OUTPATIENT
Start: 2017-01-01 | End: 2017-01-01

## 2017-01-01 RX ORDER — OXYCODONE HYDROCHLORIDE 5 MG/1
5 TABLET ORAL
Status: CANCELLED | OUTPATIENT
Start: 2017-01-01

## 2017-01-01 RX ORDER — METRONIDAZOLE 500 MG/100ML
500 INJECTION, SOLUTION INTRAVENOUS EVERY 8 HOURS
Status: DISCONTINUED | OUTPATIENT
Start: 2017-01-01 | End: 2017-01-01 | Stop reason: SDUPTHER

## 2017-01-01 RX ORDER — PROPOFOL 10 MG/ML
INJECTION, EMULSION INTRAVENOUS
Status: DISCONTINUED | OUTPATIENT
Start: 2017-01-01 | End: 2017-01-01 | Stop reason: HOSPADM

## 2017-01-01 RX ORDER — HEPARIN SODIUM 5000 [USP'U]/ML
5000 INJECTION, SOLUTION INTRAVENOUS; SUBCUTANEOUS EVERY 8 HOURS
Status: DISCONTINUED | OUTPATIENT
Start: 2017-01-01 | End: 2017-01-01 | Stop reason: HOSPADM

## 2017-01-01 RX ORDER — METRONIDAZOLE 500 MG/100ML
500 INJECTION, SOLUTION INTRAVENOUS ONCE
Status: COMPLETED | OUTPATIENT
Start: 2017-01-01 | End: 2017-01-01

## 2017-01-01 RX ORDER — NALOXONE HYDROCHLORIDE 0.4 MG/ML
INJECTION, SOLUTION INTRAMUSCULAR; INTRAVENOUS; SUBCUTANEOUS
Status: COMPLETED
Start: 2017-01-01 | End: 2017-01-01

## 2017-01-01 RX ORDER — DOCUSATE SODIUM 100 MG/1
100 CAPSULE, LIQUID FILLED ORAL 2 TIMES DAILY
Status: DISCONTINUED | OUTPATIENT
Start: 2017-01-01 | End: 2017-01-01

## 2017-01-01 RX ORDER — CLOPIDOGREL BISULFATE 75 MG/1
75 TABLET ORAL DAILY
Status: DISCONTINUED | OUTPATIENT
Start: 2017-01-01 | End: 2017-01-01

## 2017-01-01 RX ORDER — ATORVASTATIN CALCIUM 40 MG/1
80 TABLET, FILM COATED ORAL
Status: DISCONTINUED | OUTPATIENT
Start: 2017-01-01 | End: 2017-01-01 | Stop reason: HOSPADM

## 2017-01-01 RX ORDER — HEPARIN SODIUM 5000 [USP'U]/ML
5000 INJECTION, SOLUTION INTRAVENOUS; SUBCUTANEOUS EVERY 8 HOURS
Status: DISCONTINUED | OUTPATIENT
Start: 2017-01-01 | End: 2017-01-01

## 2017-01-01 RX ORDER — AMOXICILLIN 250 MG
2 CAPSULE ORAL DAILY
Status: DISCONTINUED | OUTPATIENT
Start: 2017-01-01 | End: 2017-01-01 | Stop reason: HOSPADM

## 2017-01-01 RX ORDER — CELECOXIB 200 MG/1
200 CAPSULE ORAL EVERY 12 HOURS
Status: DISCONTINUED | OUTPATIENT
Start: 2017-01-01 | End: 2017-01-01 | Stop reason: HOSPADM

## 2017-01-01 RX ORDER — CEFAZOLIN SODIUM IN 0.9 % NACL 2 G/50 ML
2 INTRAVENOUS SOLUTION, PIGGYBACK (ML) INTRAVENOUS EVERY 8 HOURS
Status: COMPLETED | OUTPATIENT
Start: 2017-01-01 | End: 2017-01-01

## 2017-01-01 RX ORDER — SODIUM CHLORIDE 9 MG/ML
75 INJECTION, SOLUTION INTRAVENOUS CONTINUOUS
Status: DISPENSED | OUTPATIENT
Start: 2017-01-01 | End: 2017-01-01

## 2017-01-01 RX ORDER — MELATONIN
1000 DAILY
Status: DISCONTINUED | OUTPATIENT
Start: 2017-01-01 | End: 2017-01-01

## 2017-01-01 RX ADMIN — CLOPIDOGREL BISULFATE 75 MG: 75 TABLET ORAL at 11:17

## 2017-01-01 RX ADMIN — SODIUM CHLORIDE, SODIUM LACTATE, POTASSIUM CHLORIDE, AND CALCIUM CHLORIDE: 600; 310; 30; 20 INJECTION, SOLUTION INTRAVENOUS at 08:06

## 2017-01-01 RX ADMIN — HEPARIN SODIUM 5000 UNITS: 5000 INJECTION, SOLUTION INTRAVENOUS; SUBCUTANEOUS at 08:17

## 2017-01-01 RX ADMIN — ASPIRIN 325 MG ORAL TABLET 325 MG: 325 PILL ORAL at 09:59

## 2017-01-01 RX ADMIN — SENNOSIDES AND DOCUSATE SODIUM 2 TABLET: 8.6; 5 TABLET ORAL at 09:33

## 2017-01-01 RX ADMIN — ACETAMINOPHEN 1000 MG: 500 TABLET, FILM COATED ORAL at 23:11

## 2017-01-01 RX ADMIN — BUPIVACAINE HYDROCHLORIDE 20 ML: 5 INJECTION, SOLUTION EPIDURAL; INTRACAUDAL at 07:24

## 2017-01-01 RX ADMIN — SODIUM CHLORIDE 200 ML/HR: 900 INJECTION, SOLUTION INTRAVENOUS at 22:15

## 2017-01-01 RX ADMIN — PROPOFOL 20 MG: 10 INJECTION, EMULSION INTRAVENOUS at 07:20

## 2017-01-01 RX ADMIN — CEFTRIAXONE 2 G: 2 INJECTION, POWDER, FOR SOLUTION INTRAMUSCULAR; INTRAVENOUS at 12:13

## 2017-01-01 RX ADMIN — ACETAMINOPHEN 650 MG: 325 TABLET, FILM COATED ORAL at 23:43

## 2017-01-01 RX ADMIN — LEVOTHYROXINE SODIUM 150 MCG: 75 TABLET ORAL at 05:36

## 2017-01-01 RX ADMIN — SODIUM CHLORIDE 50 ML/HR: 900 INJECTION, SOLUTION INTRAVENOUS at 16:43

## 2017-01-01 RX ADMIN — CLOPIDOGREL BISULFATE 75 MG: 75 TABLET ORAL at 09:58

## 2017-01-01 RX ADMIN — DIATRIZOATE MEGLUMINE AND DIATRIZOATE SODIUM 15 ML: 600; 100 SOLUTION ORAL; RECTAL at 03:03

## 2017-01-01 RX ADMIN — CEFAZOLIN 2 G: 1 INJECTION, POWDER, FOR SOLUTION INTRAMUSCULAR; INTRAVENOUS; PARENTERAL at 08:12

## 2017-01-01 RX ADMIN — GABAPENTIN 600 MG: 300 CAPSULE ORAL at 09:31

## 2017-01-01 RX ADMIN — OXYCODONE HYDROCHLORIDE 10 MG: 5 TABLET ORAL at 04:55

## 2017-01-01 RX ADMIN — SODIUM CHLORIDE 100 ML: 900 INJECTION, SOLUTION INTRAVENOUS at 04:37

## 2017-01-01 RX ADMIN — CEFTRIAXONE SODIUM 2 G: 2 INJECTION, POWDER, FOR SOLUTION INTRAMUSCULAR; INTRAVENOUS at 21:30

## 2017-01-01 RX ADMIN — LEVOTHYROXINE SODIUM 150 MCG: 75 TABLET ORAL at 06:20

## 2017-01-01 RX ADMIN — SODIUM CHLORIDE 600 MG: 900 INJECTION, SOLUTION INTRAVENOUS at 00:41

## 2017-01-01 RX ADMIN — VANCOMYCIN HYDROCHLORIDE 1250 MG: 10 INJECTION, POWDER, LYOPHILIZED, FOR SOLUTION INTRAVENOUS at 23:40

## 2017-01-01 RX ADMIN — SODIUM CHLORIDE 150 ML/HR: 900 INJECTION, SOLUTION INTRAVENOUS at 08:31

## 2017-01-01 RX ADMIN — ATORVASTATIN CALCIUM 80 MG: 40 TABLET, FILM COATED ORAL at 21:42

## 2017-01-01 RX ADMIN — TRAZODONE HYDROCHLORIDE 150 MG: 50 TABLET ORAL at 02:28

## 2017-01-01 RX ADMIN — HEPARIN SODIUM 5000 UNITS: 5000 INJECTION, SOLUTION INTRAVENOUS; SUBCUTANEOUS at 00:45

## 2017-01-01 RX ADMIN — ACETAMINOPHEN 650 MG: 325 TABLET, FILM COATED ORAL at 06:06

## 2017-01-01 RX ADMIN — HEPARIN SODIUM 5000 UNITS: 5000 INJECTION, SOLUTION INTRAVENOUS; SUBCUTANEOUS at 21:18

## 2017-01-01 RX ADMIN — SODIUM CHLORIDE 75 ML/HR: 900 INJECTION, SOLUTION INTRAVENOUS at 16:48

## 2017-01-01 RX ADMIN — VITAMIN D, TAB 1000IU (100/BT) 1000 UNITS: 25 TAB at 08:32

## 2017-01-01 RX ADMIN — PIPERACILLIN SODIUM,TAZOBACTAM SODIUM 3.38 G: 3; .375 INJECTION, POWDER, FOR SOLUTION INTRAVENOUS at 02:23

## 2017-01-01 RX ADMIN — CEFAZOLIN 2 G: 1 INJECTION, POWDER, FOR SOLUTION INTRAMUSCULAR; INTRAVENOUS; PARENTERAL at 02:17

## 2017-01-01 RX ADMIN — SODIUM POLYSTYRENE SULFONATE 15 G: 15 SUSPENSION ORAL; RECTAL at 00:42

## 2017-01-01 RX ADMIN — PIPERACILLIN SODIUM,TAZOBACTAM SODIUM 3.38 G: 3; .375 INJECTION, POWDER, FOR SOLUTION INTRAVENOUS at 12:31

## 2017-01-01 RX ADMIN — METOPROLOL SUCCINATE 12.5 MG: 25 TABLET, EXTENDED RELEASE ORAL at 09:12

## 2017-01-01 RX ADMIN — FUROSEMIDE 20 MG: 10 INJECTION, SOLUTION INTRAMUSCULAR; INTRAVENOUS at 11:39

## 2017-01-01 RX ADMIN — TRAZODONE HYDROCHLORIDE 50 MG: 50 TABLET ORAL at 23:43

## 2017-01-01 RX ADMIN — LIDOCAINE HYDROCHLORIDE 0.1 ML: 10 INJECTION, SOLUTION INFILTRATION; PERINEURAL at 06:34

## 2017-01-01 RX ADMIN — SODIUM CHLORIDE, SODIUM LACTATE, POTASSIUM CHLORIDE, AND CALCIUM CHLORIDE 100 ML/HR: 600; 310; 30; 20 INJECTION, SOLUTION INTRAVENOUS at 05:52

## 2017-01-01 RX ADMIN — VANCOMYCIN HYDROCHLORIDE 1750 MG: 10 INJECTION, POWDER, LYOPHILIZED, FOR SOLUTION INTRAVENOUS at 19:25

## 2017-01-01 RX ADMIN — ATORVASTATIN CALCIUM 40 MG: 40 TABLET, FILM COATED ORAL at 02:28

## 2017-01-01 RX ADMIN — LEVOTHYROXINE SODIUM 150 MCG: 75 TABLET ORAL at 05:45

## 2017-01-01 RX ADMIN — OXYCODONE HYDROCHLORIDE 5 MG: 5 TABLET ORAL at 02:28

## 2017-01-01 RX ADMIN — SODIUM CHLORIDE 75 ML/HR: 900 INJECTION, SOLUTION INTRAVENOUS at 21:30

## 2017-01-01 RX ADMIN — IOPAMIDOL 100 ML: 755 INJECTION, SOLUTION INTRAVENOUS at 04:37

## 2017-01-01 RX ADMIN — NALOXONE HYDROCHLORIDE 0.4 MG: 0.4 INJECTION, SOLUTION INTRAMUSCULAR; INTRAVENOUS; SUBCUTANEOUS at 18:09

## 2017-01-01 RX ADMIN — ACETAMINOPHEN 650 MG: 325 TABLET, FILM COATED ORAL at 01:07

## 2017-01-01 RX ADMIN — METRONIDAZOLE 500 MG: 500 INJECTION, SOLUTION INTRAVENOUS at 22:15

## 2017-01-01 RX ADMIN — PROPOFOL 75 MCG/KG/MIN: 10 INJECTION, EMULSION INTRAVENOUS at 08:22

## 2017-01-01 RX ADMIN — HEPARIN SODIUM 5000 UNITS: 5000 INJECTION, SOLUTION INTRAVENOUS; SUBCUTANEOUS at 11:17

## 2017-01-01 RX ADMIN — FINASTERIDE 5 MG: 5 TABLET, FILM COATED ORAL at 01:07

## 2017-01-01 RX ADMIN — HEPARIN SODIUM 5000 UNITS: 5000 INJECTION, SOLUTION INTRAVENOUS; SUBCUTANEOUS at 00:34

## 2017-01-01 RX ADMIN — TRAZODONE HYDROCHLORIDE 50 MG: 50 TABLET ORAL at 22:12

## 2017-01-01 RX ADMIN — VITAMIN D, TAB 1000IU (100/BT) 1000 UNITS: 25 TAB at 10:00

## 2017-01-01 RX ADMIN — ASPIRIN 81 MG: 81 TABLET, COATED ORAL at 08:31

## 2017-01-01 RX ADMIN — METOPROLOL SUCCINATE 12.5 MG: 25 TABLET, EXTENDED RELEASE ORAL at 11:02

## 2017-01-01 RX ADMIN — HEPARIN SODIUM 5000 UNITS: 5000 INJECTION, SOLUTION INTRAVENOUS; SUBCUTANEOUS at 05:53

## 2017-01-01 RX ADMIN — CLOPIDOGREL BISULFATE 75 MG: 75 TABLET ORAL at 09:12

## 2017-01-01 RX ADMIN — CEFAZOLIN 2 G: 1 INJECTION, POWDER, FOR SOLUTION INTRAMUSCULAR; INTRAVENOUS; PARENTERAL at 17:22

## 2017-01-01 RX ADMIN — ATORVASTATIN CALCIUM 80 MG: 40 TABLET, FILM COATED ORAL at 21:05

## 2017-01-01 RX ADMIN — GABAPENTIN 600 MG: 300 CAPSULE ORAL at 10:00

## 2017-01-01 RX ADMIN — HEPARIN SODIUM 5000 UNITS: 5000 INJECTION, SOLUTION INTRAVENOUS; SUBCUTANEOUS at 00:40

## 2017-01-01 RX ADMIN — Medication 10 ML: at 01:12

## 2017-01-01 RX ADMIN — REGULAR STRENGTH 325 MG: 325 TABLET ORAL at 21:05

## 2017-01-01 RX ADMIN — BUPIVACAINE HYDROCHLORIDE 1.8 ML: 7.5 INJECTION, SOLUTION INTRASPINAL at 08:21

## 2017-01-01 RX ADMIN — TUBERCULIN PURIFIED PROTEIN DERIVATIVE 5 UNITS: 5 INJECTION, SOLUTION INTRADERMAL at 06:35

## 2017-01-01 RX ADMIN — GABAPENTIN 600 MG: 300 CAPSULE ORAL at 08:31

## 2017-01-01 RX ADMIN — NOREPINEPHRINE BITARTRATE 4 MCG/MIN: 1 INJECTION INTRAVENOUS at 04:40

## 2017-01-01 RX ADMIN — DULOXETINE 60 MG: 60 CAPSULE, DELAYED RELEASE ORAL at 10:00

## 2017-01-01 RX ADMIN — ROPIVACAINE HYDROCHLORIDE 60 ML: 2 INJECTION, SOLUTION EPIDURAL; INFILTRATION at 08:59

## 2017-01-01 RX ADMIN — CLOPIDOGREL BISULFATE 75 MG: 75 TABLET ORAL at 11:02

## 2017-01-01 RX ADMIN — LEVOTHYROXINE SODIUM 150 MCG: 75 TABLET ORAL at 11:02

## 2017-01-01 RX ADMIN — LIDOCAINE HYDROCHLORIDE 10 ML: 20 INJECTION, SOLUTION EPIDURAL; INFILTRATION; INTRACAUDAL; PERINEURAL at 07:24

## 2017-01-01 RX ADMIN — HEPARIN SODIUM 5000 UNITS: 5000 INJECTION, SOLUTION INTRAVENOUS; SUBCUTANEOUS at 06:21

## 2017-01-01 RX ADMIN — FINASTERIDE 5 MG: 5 TABLET, FILM COATED ORAL at 21:43

## 2017-01-01 RX ADMIN — DULOXETINE HYDROCHLORIDE 60 MG: 60 CAPSULE, DELAYED RELEASE ORAL at 06:21

## 2017-01-01 RX ADMIN — AZITHROMYCIN MONOHYDRATE 500 MG: 500 INJECTION, POWDER, LYOPHILIZED, FOR SOLUTION INTRAVENOUS at 11:01

## 2017-01-01 RX ADMIN — TETANUS TOXOID, REDUCED DIPHTHERIA TOXOID AND ACELLULAR PERTUSSIS VACCINE, ADSORBED 0.5 ML: 5; 2.5; 8; 8; 2.5 SUSPENSION INTRAMUSCULAR at 16:38

## 2017-01-01 RX ADMIN — ACETAMINOPHEN 1000 MG: 500 TABLET, FILM COATED ORAL at 06:15

## 2017-01-01 RX ADMIN — Medication 10 ML: at 06:09

## 2017-01-01 RX ADMIN — FENTANYL CITRATE 25 MCG: 50 INJECTION, SOLUTION INTRAMUSCULAR; INTRAVENOUS at 08:14

## 2017-01-01 RX ADMIN — Medication 10 ML: at 14:07

## 2017-01-01 RX ADMIN — PIPERACILLIN SODIUM,TAZOBACTAM SODIUM 3.38 G: 3; .375 INJECTION, POWDER, FOR SOLUTION INTRAVENOUS at 12:36

## 2017-01-01 RX ADMIN — Medication 10 ML: at 04:37

## 2017-01-01 RX ADMIN — ALBUMIN (HUMAN) 12.5 G: 0.25 INJECTION, SOLUTION INTRAVENOUS at 02:52

## 2017-01-01 RX ADMIN — ACETAMINOPHEN 650 MG: 325 SOLUTION ORAL at 02:56

## 2017-01-01 RX ADMIN — DULOXETINE HYDROCHLORIDE 60 MG: 60 CAPSULE, DELAYED RELEASE ORAL at 11:01

## 2017-01-01 RX ADMIN — Medication 10 ML: at 05:22

## 2017-01-01 RX ADMIN — PANTOPRAZOLE SODIUM 40 MG: 40 TABLET, DELAYED RELEASE ORAL at 06:20

## 2017-01-01 RX ADMIN — TRAZODONE HYDROCHLORIDE 50 MG: 50 TABLET ORAL at 01:07

## 2017-01-01 RX ADMIN — NEOMYCIN AND POLYMYXIN B SULFATES 3 AMPULE: 40; 200000 SOLUTION IRRIGATION at 08:59

## 2017-01-01 RX ADMIN — DUTASTERIDE 0.5 MG: 0.5 CAPSULE, LIQUID FILLED ORAL at 10:00

## 2017-01-01 RX ADMIN — ALBUTEROL SULFATE 2.5 MG: 2.5 SOLUTION RESPIRATORY (INHALATION) at 01:35

## 2017-01-01 RX ADMIN — SODIUM CHLORIDE 500 ML: 900 INJECTION, SOLUTION INTRAVENOUS at 22:23

## 2017-01-01 RX ADMIN — REGULAR STRENGTH 325 MG: 325 TABLET ORAL at 09:31

## 2017-01-01 RX ADMIN — FENTANYL CITRATE 25 MCG: 50 INJECTION, SOLUTION INTRAMUSCULAR; INTRAVENOUS at 07:20

## 2017-01-01 RX ADMIN — DUTASTERIDE 0.5 MG: 0.5 CAPSULE, LIQUID FILLED ORAL at 08:37

## 2017-01-01 RX ADMIN — TUBERCULIN PURIFIED PROTEIN DERIVATIVE 5 UNITS: 5 INJECTION, SOLUTION INTRADERMAL at 16:16

## 2017-01-01 RX ADMIN — AZITHROMYCIN MONOHYDRATE 500 MG: 500 INJECTION, POWDER, LYOPHILIZED, FOR SOLUTION INTRAVENOUS at 11:22

## 2017-01-01 RX ADMIN — HEPARIN SODIUM 5000 UNITS: 5000 INJECTION, SOLUTION INTRAVENOUS; SUBCUTANEOUS at 05:44

## 2017-01-01 RX ADMIN — CEFTRIAXONE 2 G: 2 INJECTION, POWDER, FOR SOLUTION INTRAMUSCULAR; INTRAVENOUS at 12:39

## 2017-01-01 RX ADMIN — ATORVASTATIN CALCIUM 80 MG: 40 TABLET, FILM COATED ORAL at 01:07

## 2017-01-01 RX ADMIN — Medication 10 ML: at 05:44

## 2017-01-01 RX ADMIN — PANTOPRAZOLE SODIUM 40 MG: 40 TABLET, DELAYED RELEASE ORAL at 05:36

## 2017-01-01 RX ADMIN — TAMSULOSIN HYDROCHLORIDE 0.4 MG: 0.4 CAPSULE ORAL at 21:06

## 2017-01-01 RX ADMIN — FINASTERIDE 5 MG: 5 TABLET, FILM COATED ORAL at 22:12

## 2017-01-01 RX ADMIN — TAMSULOSIN HYDROCHLORIDE 0.4 MG: 0.4 CAPSULE ORAL at 21:35

## 2017-01-01 RX ADMIN — DULOXETINE HYDROCHLORIDE 60 MG: 60 CAPSULE, DELAYED RELEASE ORAL at 05:36

## 2017-01-01 RX ADMIN — FINASTERIDE 5 MG: 5 TABLET, FILM COATED ORAL at 21:35

## 2017-01-01 RX ADMIN — CEFTRIAXONE 1 G: 1 INJECTION, POWDER, FOR SOLUTION INTRAMUSCULAR; INTRAVENOUS at 11:59

## 2017-01-01 RX ADMIN — GUAIFENESIN 400 MG: 200 SOLUTION ORAL at 06:19

## 2017-01-01 RX ADMIN — DOCUSATE SODIUM 100 MG: 100 CAPSULE, LIQUID FILLED ORAL at 10:00

## 2017-01-01 RX ADMIN — Medication 10 ML: at 02:30

## 2017-01-01 RX ADMIN — Medication 10 ML: at 14:12

## 2017-01-01 RX ADMIN — Medication 10 ML: at 22:00

## 2017-01-01 RX ADMIN — ASPIRIN 325 MG ORAL TABLET 325 MG: 325 PILL ORAL at 11:02

## 2017-01-01 RX ADMIN — OXYCODONE HYDROCHLORIDE 5 MG: 5 TABLET ORAL at 06:54

## 2017-01-01 RX ADMIN — LEVOTHYROXINE SODIUM 150 MCG: 100 TABLET ORAL at 06:15

## 2017-01-01 RX ADMIN — PIPERACILLIN SODIUM,TAZOBACTAM SODIUM 3.38 G: 3; .375 INJECTION, POWDER, FOR SOLUTION INTRAVENOUS at 21:31

## 2017-01-01 RX ADMIN — TAMSULOSIN HYDROCHLORIDE 0.4 MG: 0.4 CAPSULE ORAL at 22:12

## 2017-01-01 RX ADMIN — CYANOCOBALAMIN TAB 1000 MCG 1000 MCG: 1000 TAB at 08:32

## 2017-01-01 RX ADMIN — OXYCODONE HYDROCHLORIDE 5 MG: 5 TABLET ORAL at 12:30

## 2017-01-01 RX ADMIN — ATORVASTATIN CALCIUM 80 MG: 40 TABLET, FILM COATED ORAL at 22:12

## 2017-01-01 RX ADMIN — Medication 10 ML: at 17:47

## 2017-01-01 RX ADMIN — Medication 10 ML: at 05:38

## 2017-01-01 RX ADMIN — ONDANSETRON 4 MG: 2 INJECTION INTRAMUSCULAR; INTRAVENOUS at 22:25

## 2017-01-01 RX ADMIN — NALOXONE HYDROCHLORIDE: 0.4 INJECTION, SOLUTION INTRAMUSCULAR; INTRAVENOUS; SUBCUTANEOUS at 18:54

## 2017-01-01 RX ADMIN — ALBUTEROL SULFATE 2.5 MG: 2.5 SOLUTION RESPIRATORY (INHALATION) at 07:10

## 2017-01-01 RX ADMIN — AZITHROMYCIN MONOHYDRATE 500 MG: 500 INJECTION, POWDER, LYOPHILIZED, FOR SOLUTION INTRAVENOUS at 10:29

## 2017-01-01 RX ADMIN — OXYCODONE HYDROCHLORIDE 5 MG: 5 TABLET ORAL at 08:38

## 2017-01-01 RX ADMIN — PANTOPRAZOLE SODIUM 40 MG: 40 TABLET, DELAYED RELEASE ORAL at 08:17

## 2017-01-01 RX ADMIN — DULOXETINE HYDROCHLORIDE 60 MG: 60 CAPSULE, DELAYED RELEASE ORAL at 06:06

## 2017-01-01 RX ADMIN — ACETAMINOPHEN 1000 MG: 10 INJECTION, SOLUTION INTRAVENOUS at 17:25

## 2017-01-01 RX ADMIN — HEPARIN SODIUM 5000 UNITS: 5000 INJECTION, SOLUTION INTRAVENOUS; SUBCUTANEOUS at 01:08

## 2017-01-01 RX ADMIN — CLOPIDOGREL BISULFATE 75 MG: 75 TABLET ORAL at 08:16

## 2017-01-01 RX ADMIN — Medication 10 ML: at 21:46

## 2017-01-01 RX ADMIN — Medication 10 ML: at 16:29

## 2017-01-01 RX ADMIN — SODIUM CHLORIDE 1000 ML: 900 INJECTION, SOLUTION INTRAVENOUS at 20:31

## 2017-01-01 RX ADMIN — LEVOTHYROXINE SODIUM 150 MCG: 75 TABLET ORAL at 08:17

## 2017-01-01 RX ADMIN — Medication 10 ML: at 16:17

## 2017-01-01 RX ADMIN — METOPROLOL SUCCINATE 12.5 MG: 25 TABLET, EXTENDED RELEASE ORAL at 11:17

## 2017-01-01 RX ADMIN — PANTOPRAZOLE SODIUM 40 MG: 40 TABLET, DELAYED RELEASE ORAL at 11:02

## 2017-01-01 RX ADMIN — LEVOTHYROXINE SODIUM 150 MCG: 75 TABLET ORAL at 06:21

## 2017-01-01 RX ADMIN — Medication 10 ML: at 06:00

## 2017-01-01 RX ADMIN — KETOROLAC TROMETHAMINE 30 MG: 30 INJECTION, SOLUTION INTRAMUSCULAR; INTRAVENOUS at 08:57

## 2017-01-01 RX ADMIN — HEPARIN SODIUM 5000 UNITS: 5000 INJECTION, SOLUTION INTRAVENOUS; SUBCUTANEOUS at 16:18

## 2017-01-01 RX ADMIN — ALBUMIN (HUMAN) 12.5 G: 0.25 INJECTION, SOLUTION INTRAVENOUS at 04:56

## 2017-01-01 RX ADMIN — MORPHINE SULFATE 10 MG: 10 INJECTION INTRAMUSCULAR; INTRAVENOUS; SUBCUTANEOUS at 08:59

## 2017-01-01 RX ADMIN — PANTOPRAZOLE SODIUM 40 MG: 40 TABLET, DELAYED RELEASE ORAL at 05:45

## 2017-01-01 RX ADMIN — ACETAMINOPHEN 650 MG: 325 TABLET, FILM COATED ORAL at 17:20

## 2017-01-01 RX ADMIN — DOCUSATE SODIUM 100 MG: 100 CAPSULE, LIQUID FILLED ORAL at 08:31

## 2017-01-01 RX ADMIN — FINASTERIDE 5 MG: 5 TABLET, FILM COATED ORAL at 21:06

## 2017-01-01 RX ADMIN — TAMSULOSIN HYDROCHLORIDE 0.4 MG: 0.4 CAPSULE ORAL at 02:28

## 2017-01-01 RX ADMIN — ONDANSETRON 4 MG: 2 INJECTION INTRAMUSCULAR; INTRAVENOUS at 08:27

## 2017-01-01 RX ADMIN — SODIUM CHLORIDE, SODIUM LACTATE, POTASSIUM CHLORIDE, AND CALCIUM CHLORIDE 1000 ML: 600; 310; 30; 20 INJECTION, SOLUTION INTRAVENOUS at 06:36

## 2017-01-01 RX ADMIN — FINASTERIDE 5 MG: 5 TABLET, FILM COATED ORAL at 21:05

## 2017-01-01 RX ADMIN — ACETAMINOPHEN 650 MG: 325 TABLET, FILM COATED ORAL at 10:50

## 2017-01-01 RX ADMIN — ASPIRIN 325 MG ORAL TABLET 325 MG: 325 PILL ORAL at 08:17

## 2017-01-01 RX ADMIN — HEPARIN SODIUM 5000 UNITS: 5000 INJECTION, SOLUTION INTRAVENOUS; SUBCUTANEOUS at 17:46

## 2017-01-01 RX ADMIN — ACETAMINOPHEN 650 MG: 325 TABLET, FILM COATED ORAL at 22:12

## 2017-01-01 RX ADMIN — TAMSULOSIN HYDROCHLORIDE 0.4 MG: 0.4 CAPSULE ORAL at 21:05

## 2017-01-01 RX ADMIN — PIPERACILLIN SODIUM,TAZOBACTAM SODIUM 3.38 G: 3; .375 INJECTION, POWDER, FOR SOLUTION INTRAVENOUS at 19:11

## 2017-01-01 RX ADMIN — CELECOXIB 200 MG: 200 CAPSULE ORAL at 21:05

## 2017-01-01 RX ADMIN — HEPARIN SODIUM 5000 UNITS: 5000 INJECTION, SOLUTION INTRAVENOUS; SUBCUTANEOUS at 16:41

## 2017-01-01 RX ADMIN — ALBUTEROL SULFATE 2.5 MG: 2.5 SOLUTION RESPIRATORY (INHALATION) at 21:44

## 2017-01-01 RX ADMIN — PROPOFOL 10 MG: 10 INJECTION, EMULSION INTRAVENOUS at 07:21

## 2017-01-01 RX ADMIN — SODIUM CHLORIDE 40 MG: 9 INJECTION INTRAMUSCULAR; INTRAVENOUS; SUBCUTANEOUS at 06:22

## 2017-01-01 RX ADMIN — POTASSIUM CHLORIDE 40 MEQ: 20 TABLET, EXTENDED RELEASE ORAL at 09:33

## 2017-01-01 RX ADMIN — PANTOPRAZOLE SODIUM 40 MG: 40 TABLET, DELAYED RELEASE ORAL at 05:52

## 2017-01-01 RX ADMIN — HEPARIN SODIUM 5000 UNITS: 5000 INJECTION, SOLUTION INTRAVENOUS; SUBCUTANEOUS at 16:49

## 2017-01-01 RX ADMIN — Medication 10 ML: at 21:39

## 2017-01-01 RX ADMIN — ASPIRIN 325 MG ORAL TABLET 325 MG: 325 PILL ORAL at 11:17

## 2017-01-01 RX ADMIN — MORPHINE SULFATE 2 MG: 10 INJECTION, SOLUTION INTRAMUSCULAR; INTRAVENOUS at 09:05

## 2017-01-01 RX ADMIN — TAMSULOSIN HYDROCHLORIDE 0.4 MG: 0.4 CAPSULE ORAL at 21:43

## 2017-01-01 RX ADMIN — LEVOFLOXACIN 750 MG: 5 INJECTION, SOLUTION INTRAVENOUS at 23:45

## 2017-01-01 RX ADMIN — SODIUM CHLORIDE, SODIUM LACTATE, POTASSIUM CHLORIDE, AND CALCIUM CHLORIDE 500 ML: 600; 310; 30; 20 INJECTION, SOLUTION INTRAVENOUS at 04:25

## 2017-01-01 RX ADMIN — AZITHROMYCIN MONOHYDRATE 500 MG: 500 INJECTION, POWDER, LYOPHILIZED, FOR SOLUTION INTRAVENOUS at 09:58

## 2017-01-01 RX ADMIN — DIATRIZOATE MEGLUMINE AND DIATRIZOATE SODIUM 30 ML: 600; 100 SOLUTION ORAL; RECTAL at 21:31

## 2017-01-01 RX ADMIN — CLOPIDOGREL BISULFATE 75 MG: 75 TABLET ORAL at 10:00

## 2017-01-01 RX ADMIN — ASPIRIN 325 MG ORAL TABLET 325 MG: 325 PILL ORAL at 01:07

## 2017-01-01 RX ADMIN — HEPARIN SODIUM 5000 UNITS: 5000 INJECTION, SOLUTION INTRAVENOUS; SUBCUTANEOUS at 17:00

## 2017-01-01 RX ADMIN — HEPARIN SODIUM 5000 UNITS: 5000 INJECTION, SOLUTION INTRAVENOUS; SUBCUTANEOUS at 09:58

## 2017-01-01 RX ADMIN — PIPERACILLIN SODIUM,TAZOBACTAM SODIUM 3.38 G: 3; .375 INJECTION, POWDER, FOR SOLUTION INTRAVENOUS at 04:09

## 2017-01-01 RX ADMIN — SODIUM CHLORIDE, SODIUM LACTATE, POTASSIUM CHLORIDE, AND CALCIUM CHLORIDE: 600; 310; 30; 20 INJECTION, SOLUTION INTRAVENOUS at 08:42

## 2017-01-01 RX ADMIN — FENTANYL CITRATE 25 MCG: 50 INJECTION, SOLUTION INTRAMUSCULAR; INTRAVENOUS at 08:20

## 2017-01-01 RX ADMIN — DIPHENHYDRAMINE HYDROCHLORIDE 12.5 MG: 50 INJECTION, SOLUTION INTRAMUSCULAR; INTRAVENOUS at 08:37

## 2017-01-01 RX ADMIN — CYANOCOBALAMIN TAB 1000 MCG 1000 MCG: 1000 TAB at 10:00

## 2017-01-01 RX ADMIN — SODIUM CHLORIDE 150 ML/HR: 900 INJECTION, SOLUTION INTRAVENOUS at 00:38

## 2017-01-01 RX ADMIN — METOPROLOL SUCCINATE 12.5 MG: 25 TABLET, EXTENDED RELEASE ORAL at 08:17

## 2017-01-01 RX ADMIN — SODIUM CHLORIDE, SODIUM LACTATE, POTASSIUM CHLORIDE, AND CALCIUM CHLORIDE 500 ML: 600; 310; 30; 20 INJECTION, SOLUTION INTRAVENOUS at 02:11

## 2017-01-01 RX ADMIN — TRANEXAMIC ACID 1000 MG: 100 INJECTION, SOLUTION INTRAVENOUS at 08:27

## 2017-01-01 RX ADMIN — HEPARIN SODIUM 5000 UNITS: 5000 INJECTION, SOLUTION INTRAVENOUS; SUBCUTANEOUS at 00:20

## 2017-01-01 RX ADMIN — SODIUM CHLORIDE 75 ML/HR: 900 INJECTION, SOLUTION INTRAVENOUS at 01:08

## 2017-01-01 RX ADMIN — CEFTRIAXONE 1 G: 1 INJECTION, POWDER, FOR SOLUTION INTRAMUSCULAR; INTRAVENOUS at 12:28

## 2017-01-01 RX ADMIN — HEPARIN SODIUM 5000 UNITS: 5000 INJECTION, SOLUTION INTRAVENOUS; SUBCUTANEOUS at 09:13

## 2017-01-01 RX ADMIN — BUPIVACAINE HYDROCHLORIDE 20 ML: 2.5 INJECTION, SOLUTION EPIDURAL; INFILTRATION; INTRACAUDAL at 07:24

## 2017-01-01 RX ADMIN — CEFTRIAXONE 2 G: 2 INJECTION, POWDER, FOR SOLUTION INTRAMUSCULAR; INTRAVENOUS at 13:01

## 2017-01-01 RX ADMIN — VANCOMYCIN HYDROCHLORIDE 1250 MG: 10 INJECTION, POWDER, LYOPHILIZED, FOR SOLUTION INTRAVENOUS at 10:09

## 2017-01-01 RX ADMIN — DEXAMETHASONE SODIUM PHOSPHATE 10 MG: 4 INJECTION, SOLUTION INTRA-ARTICULAR; INTRALESIONAL; INTRAMUSCULAR; INTRAVENOUS; SOFT TISSUE at 08:27

## 2017-01-01 RX ADMIN — CLOPIDOGREL BISULFATE 75 MG: 75 TABLET ORAL at 08:32

## 2017-01-01 RX ADMIN — HEPARIN SODIUM 5000 UNITS: 5000 INJECTION, SOLUTION INTRAVENOUS; SUBCUTANEOUS at 14:07

## 2017-01-01 RX ADMIN — DULOXETINE 60 MG: 60 CAPSULE, DELAYED RELEASE ORAL at 09:00

## 2017-01-01 RX ADMIN — ACETAMINOPHEN 650 MG: 325 TABLET, FILM COATED ORAL at 21:37

## 2017-01-01 RX ADMIN — ATORVASTATIN CALCIUM 80 MG: 40 TABLET, FILM COATED ORAL at 21:35

## 2017-01-01 RX ADMIN — HEPARIN SODIUM 5000 UNITS: 5000 INJECTION, SOLUTION INTRAVENOUS; SUBCUTANEOUS at 21:34

## 2017-01-01 RX ADMIN — METOPROLOL SUCCINATE 12.5 MG: 25 TABLET, EXTENDED RELEASE ORAL at 09:59

## 2017-01-01 RX ADMIN — Medication 10 ML: at 22:14

## 2017-01-01 RX ADMIN — Medication 10 ML: at 14:10

## 2017-01-01 RX ADMIN — SODIUM CHLORIDE 100 ML/HR: 900 INJECTION, SOLUTION INTRAVENOUS at 17:21

## 2017-01-01 RX ADMIN — ASPIRIN 81 MG: 81 TABLET, COATED ORAL at 10:00

## 2017-01-01 RX ADMIN — LEVOTHYROXINE SODIUM 150 MCG: 75 TABLET ORAL at 05:52

## 2017-01-01 RX ADMIN — PANTOPRAZOLE SODIUM 40 MG: 40 TABLET, DELAYED RELEASE ORAL at 06:15

## 2017-01-01 RX ADMIN — DULOXETINE HYDROCHLORIDE 60 MG: 60 CAPSULE, DELAYED RELEASE ORAL at 09:32

## 2017-01-01 RX ADMIN — TRAZODONE HYDROCHLORIDE 150 MG: 50 TABLET ORAL at 23:10

## 2017-01-01 RX ADMIN — PANTOPRAZOLE SODIUM 40 MG: 40 TABLET, DELAYED RELEASE ORAL at 06:06

## 2017-01-01 RX ADMIN — HEPARIN SODIUM 5000 UNITS: 5000 INJECTION, SOLUTION INTRAVENOUS; SUBCUTANEOUS at 11:00

## 2017-01-01 RX ADMIN — SODIUM CHLORIDE 1000 ML: 900 INJECTION, SOLUTION INTRAVENOUS at 02:05

## 2017-01-01 RX ADMIN — TRAZODONE HYDROCHLORIDE 50 MG: 50 TABLET ORAL at 21:05

## 2017-01-01 RX ADMIN — DULOXETINE HYDROCHLORIDE 60 MG: 60 CAPSULE, DELAYED RELEASE ORAL at 08:17

## 2017-01-01 RX ADMIN — TAMSULOSIN HYDROCHLORIDE 0.4 MG: 0.4 CAPSULE ORAL at 01:07

## 2017-02-01 PROBLEM — G93.40 ACUTE ENCEPHALOPATHY: Status: ACTIVE | Noted: 2017-01-01

## 2017-02-02 NOTE — PROGRESS NOTES
Now with fever. UA reportedly negative per nurse. Will send for micro and culture. PRN tylenol. Add procalcitonin. May be viral in nature. Hold off on abx for now.

## 2017-02-02 NOTE — PROGRESS NOTES
Hospitalist Progress Note    2017  Admit Date: 2017  7:57 PM   NAME: Wandy Avila   :  1935   MRN:  [id number]   Attending: Jennifer Whitman DO  PCP:  Sunday Perez MD      Admitted for: acute metabolic encephalopathy- fevers of unclear etiology with concern for sepsis    SUBJECTIVE:   As previously documented: \" Wandy Avila is a 80 y.o.  male who presents with weakness and frequent falls. This AM he was found down on floor and called EMS for help. He then was checked on by family later in the day and he was on the floor again. He cannot remember why he fell. He admits to neuropathy. His son says he is more confused although he is A&Ox3 and will answer some questions, however, he keeps repeating \"alrighty I don't know\" over and over again when asked what happened to him. No focal neuro deficits per patient. UA is pending. WBC normal, lactic acid normal, CT head negative. Is on plavix and baby aspirin following CABG and AV replacement a few months ago ( 2016). \"        Hospital Course-  Pt admitted last night by nocturnist for fall and ams. Tucker March twice but unknown how. Was confused- no slurred speech as per family- had a fever of 100.4 when ems came. Transferred here and admitted for concern for cva vs sepsis. Had fever of 101.3 this am and low grade fever again this am with confusion- so abx started. Unable to obtain and mri secondary wires in sternum from Bioprosthetic heart valve. Pt himself cant recall what happened. Oriented to time, place & person by some higher cognitive deficits on his MoCA as per OT. He wears glasses, a hearing aid and dentures at baseline.       Review of Systems negative with exception of pertinent positives noted above  Past medical history unchanged from H&P    PHYSICAL EXAM     Visit Vitals    /84    Pulse 74    Temp 98.9 °F (37.2 °C)    Resp 19    Ht 5' 10\" (1.778 m)    Wt 101.6 kg (224 lb)    SpO2 98%    BMI 32.14 kg/m2 Temp (24hrs), Av °F (37.2 °C), Min:98 °F (36.7 °C), Max:101.3 °F (38.5 °C)    Oxygen Therapy  O2 Sat (%): 98 % (17 1117)  Pulse via Oximetry: 95 beats per minute (17 0007)  O2 Device: Room air (17)    Intake/Output Summary (Last 24 hours) at 17 1511  Last data filed at 17 1249   Gross per 24 hour   Intake             1593 ml   Output              300 ml   Net             1293 ml        General: No acute distress  mucous membranes pink and moist acyanotic anicteric  Neck:  Supple full range of motion  Lungs:  Air entry equal bilaterally, no crepitations rales rhonchi   Heart:  Regular rate and rhythm,  No murmur, rub, or gallop  Abdomen: Soft, Non distended, Non tender, no rebound guarding Positive bowel sounds  Extremities: No cyanosis, clubbing or edema  Neurologic:  No focal deficits  Musculoskeletal: no   Joint swelling tenderness erythema  Skin:  No erythema, rashes noted          LAB  No results for input(s): GLUCPOC in the last 72 hours. No lab exists for component: [facility]   Recent Labs      17   0655   WBC  5.9   HGB  12.8*   HCT  41.1   PLT  126*     Recent Labs      17   2010   NA  138   K  4.0   CL  101   CO2  31   GLU  143*   BUN  16   CREA  0.99   CA  8.4   ALB  3.5   TBILI  0.6   ALT  34   SGOT  27       EKG and imaging reviewed personally by me  Ct Head Wo Cont    Result Date: 2017  HEAD CT WITHOUT CONTRAST  2017 HISTORY:   Confusion/delirium, altered LOC, unexplained  symptoms present today TECHNIQUE: Noncontrast axial images were obtained through the brain. All CT scans at this facility used dose modulation, interactive reconstruction and/or weight based dosing when appropriate to reduce radiation dose to as low as reasonably achievable. COMPARISON: 2013 FINDINGS: There is no acute intracranial hemorrhage, significant mass effect or CT evidence of acute large artery territorial infarction.  Please note that a hyperacute infarct or small vessel infarct may not be apparent on initial CT imaging. Moderate diffuse cerebral volume loss is present. A few areas of low-attenuation are present in the periventricular white matter. This finding may be present with chronic small vessel ischemic disease. There is no hydrocephalus , intra-axial mass or abnormal extra-axial fluid collection. There are no displaced skull fractures. The mastoid air cells and paranasal sinuses are clear where imaged. IMPRESSION: 1. Cerebral volume loss. 2. No acute intracranial hemorrhage. Xr Chest Port    Result Date: 2/1/2017  AP PORTABLE CHEST X-RAY HISTORY: CV surgery weeks ago. Posterior chest pain with slight fever COMPARISON: 11/7/2016 FINDINGS: Median sternotomy wires are present. EKG leads are present. Lung volumes are diminished. There is no lobar consolidation or large pleural effusions. IMPRESSION: Low lung volumes. Results for orders placed or performed during the hospital encounter of 02/01/17   EKG, 12 LEAD, INITIAL   Result Value Ref Range    Systolic BP  mmHg    Diastolic BP  mmHg    Ventricular Rate 86 BPM    Atrial Rate 86 BPM    P-R Interval 166 ms    QRS Duration 162 ms    Q-T Interval 378 ms    QTC Calculation (Bezet) 452 ms    Calculated P Axis 57 degrees    Calculated R Axis -66 degrees    Calculated T Axis 44 degrees    Diagnosis       !! AGE AND GENDER SPECIFIC ECG ANALYSIS !! Normal sinus rhythm  Right bundle branch block  Left anterior fascicular block  !!! Bifascicular block !!!   Possible Lateral infarct , age undetermined  Abnormal ECG  When compared with ECG of 27-OCT-2016 07:04,  Left anterior fascicular block is now Present  Nonspecific T wave abnormality, worse in Inferior leads  Nonspecific T wave abnormality now evident in Anterior leads  QT has shortened  Confirmed by Raji Lee MD (), ROSHAN CORDON ([id number]) on 2/1/2017 10:18:18 PM       XR Results (most recent):    Results from Hospital Encounter encounter on 02/01/17   XR CHEST PORT   Narrative AP PORTABLE CHEST X-RAY    HISTORY: CV surgery weeks ago. Posterior chest pain with slight fever    COMPARISON: 11/7/2016    FINDINGS: Median sternotomy wires are present. EKG leads are present. Lung  volumes are diminished. There is no lobar consolidation or large pleural  effusions. Impression IMPRESSION: Low lung volumes. Active problems  Active Hospital Problems    Diagnosis Date Noted    Acute encephalopathy 02/01/2017       ASSESSMENT  AND PLAN      · Encephalopathy- suspect cva vs sepsis- leaning more towards infection given the fevers.  If he does not improve on abx- will look for mor sinister causes  · Fevers- concern for sepsis- occult malignancy remains in the differential  · Suspected syncope and collapse vs fall- pt did fall- unwitnessed - will complete fall workup- unclear if he syncopized or not. -place on telemetry if not on   · Cognitive deficits- concern for some early dementia- once infection cleared or other cause excluded- will re-assess  · Edema- bl leg swelling - pitting 3 plus-  Check bnp - if not orthostatic can start gentle diuresis  · CAD- No apparent active issues- will trend out trops though    DVT Prophylaxis:   Patient remains high risk for decompensation, given syncope of unclear etiology, encephalopathy , fevers with concern for sepsis    Signed By: Amanda Tan MD     February 2, 2017

## 2017-02-02 NOTE — H&P
History and Physical    Subjective: Elsie Camarillo is a 80 y.o.  male who presents with weakness and frequent falls. This AM he was found down on floor and called EMS for help. He then was checked on by family later in the day and he was on the floor again. He cannot remember why he fell. He admits to neuropathy. His son says he is more confused although he is A&Ox3 and will answer some questions, however, he keeps repeating \"alrighty I don't know\" over and over again when asked what happened to him. No focal neuro deficits per patient. UA is pending. WBC normal, lactic acid normal, CT head negative. Is on plavix and baby aspirin following CABG and AV replacement a few months ago.      Past Medical History   Diagnosis Date    Aortic valve stenosis, nonrheumatic 4/25/2016    Cyst near tailbone      Pilondial cyst    Edema 04/25/2016     mostly RLE    Former cigarette smoker     Genital edema, male 10/31/2016    Heart murmur     History of colon polyps 5/13/2013    History of hepatitis A      about 40 years ago    Long term (current) use of anticoagulants      Plavix and Aspirin 325mg    SOB (shortness of breath)     TIA (transient ischemic attack) 5/13/2013    Transient ischemic attack (TIA) 2002    Unspecified sleep apnea 05/21/2013     pt denies      Past Surgical History   Procedure Laterality Date    Pr cardiac surg procedure unlist      Hx orthopaedic       discectomy x3    Hx hernia repair Left      L inguinal    Hx heart catheterization      Hx colonoscopy      Hx carotid endarterectomy Left 2002     Family History   Problem Relation Age of Onset    Cancer Mother      colon ca    Diabetes Mother     Hypertension Mother     Cancer Father      vocal cord ca      Social History   Substance Use Topics    Smoking status: Former Smoker     Packs/day: 2.00     Years: 35.00     Quit date: 1/21/2004    Smokeless tobacco: Never Used    Alcohol use Yes      Comment: very occassionally       Prior to Admission medications    Medication Sig Start Date End Date Taking? Authorizing Provider   aspirin delayed-release 81 mg tablet Take 1 Tab by mouth daily. 1/19/17   Elmer Goetz MD   metoprolol succinate (TOPROL-XL) 25 mg XL tablet Take 0.5 Tabs by mouth daily. 1/19/17   Elmer Goetz MD   clopidogrel (PLAVIX) 75 mg tablet Take 1 Tab by mouth daily. Indications: Myocardial Reinfarction Prevention 11/16/16   Harmony Carias MD   potassium chloride (K-DUR, KLOR-CON) 20 mEq tablet Take 2 Tabs by mouth daily. 11/14/16   Harmony Carias MD   atorvastatin (LIPITOR) 80 mg tablet Take 1 Tab by mouth nightly. 11/14/16   Harmony Carias MD   furosemide (LASIX) 20 mg tablet Take 1 Tab by mouth two (2) times a day. 11/14/16   Harmony Carias MD   ferrous gluconate 324 mg (38 mg iron) tablet Take 1 Tab by mouth Daily (before breakfast). Patient taking differently: Take 27 mg by mouth Daily (before breakfast). 10/31/16   Annita Bowman NP   oxyCODONE-acetaminophen (PERCOCET) 5-325 mg per tablet Take 1 Tab by mouth every four (4) hours as needed. Max Daily Amount: 6 Tabs. 10/31/16   Annita Bowman NP   cyanocobalamin (VITAMIN B-12) 1,000 mcg tablet Take 1,000 mcg by mouth every morning. Historical Provider   DULoxetine (CYMBALTA) 60 mg capsule Take 60 mg by mouth every morning. Historical Provider   esomeprazole (NEXIUM) 40 mg capsule Take 40 mg by mouth every morning. Historical Provider   gabapentin (NEURONTIN) 300 mg capsule Take 300 mg by mouth two (2) times a day. Historical Provider   levothyroxine (SYNTHROID) 150 mcg tablet Take 150 mcg by mouth Daily (before breakfast). Historical Provider   tamsulosin (FLOMAX) 0.4 mg capsule Take 0.4 mg by mouth nightly. Historical Provider   finasteride (PROSCAR) 5 mg tablet Take 5 mg by mouth nightly.     Historical Provider   diazepam (VALIUM) 5 mg tablet Take 5 mg by mouth every six (6) hours as needed for Anxiety. Historical Provider   cyclobenzaprine (FLEXERIL) 10 mg tablet Take  by mouth three (3) times daily as needed for Muscle Spasm(s). Historical Provider   traZODone (DESYREL) 150 mg tablet Take 150 mg by mouth nightly. Historical Provider   multivitamins-minerals-lutein (CENTRUM SILVER) Tab Take 1 Tab by mouth every morning. Historical Provider   Cholecalciferol, Vitamin D3, (VITAMIN D3) 1,000 unit cap Take 1,000 Units by mouth every morning. Historical Provider     No Known Allergies     Review of Systems:  Review of systems not obtained due to patient factors. Objective: Intake and Output:            Physical Exam:   Visit Vitals    /69    Pulse 84    Temp 98.8 °F (37.1 °C)    Resp 18    Ht 5' 10\" (1.778 m)    Wt 101.6 kg (224 lb)    SpO2 92%    BMI 32.14 kg/m2     General appearance: alert, cooperative, no distress, appears stated age  Head: Normocephalic, without obvious abnormality, atraumatic  Eyes: negative  Throat: Lips, mucosa, and tongue normal. Teeth and gums normal  Lungs: clear to auscultation bilaterally  Heart: regular rate and rhythm, S1, S2 normal, no murmur, click, rub or gallop  Abdomen: soft, non-tender. Bowel sounds normal. No masses,  no organomegaly  Extremities: extremities normal, atraumatic, no cyanosis or edema  Skin: Skin color, texture, turgor normal. No rashes or lesions  Neurologic: Left drift on upper and lower extremity however, he says he drops it due to pain. No dysmetria on finger nose or heel shin.  Has repetitive speech, Otherwise normal         Data Review:   Recent Results (from the past 24 hour(s))   EKG, 12 LEAD, INITIAL    Collection Time: 02/01/17  8:09 PM   Result Value Ref Range    Systolic BP  mmHg    Diastolic BP  mmHg    Ventricular Rate 86 BPM    Atrial Rate 86 BPM    P-R Interval 166 ms    QRS Duration 162 ms    Q-T Interval 378 ms    QTC Calculation (Bezet) 452 ms    Calculated P Axis 57 degrees    Calculated R Axis -66 degrees    Calculated T Axis 44 degrees    Diagnosis       !! AGE AND GENDER SPECIFIC ECG ANALYSIS !! Normal sinus rhythm  Right bundle branch block  Left anterior fascicular block  !!! Bifascicular block !!! Possible Lateral infarct , age undetermined  Abnormal ECG  When compared with ECG of 27-OCT-2016 07:04,  Left anterior fascicular block is now Present  Nonspecific T wave abnormality, worse in Inferior leads  Nonspecific T wave abnormality now evident in Anterior leads  QT has shortened  Confirmed by Candis Bach MD (), ROSHAN CORDON ([id number]) on 2/1/2017 10:18:18 PM     POC TROPONIN-I    Collection Time: 02/01/17  8:09 PM   Result Value Ref Range    Troponin-I (POC) 0.02 0.0 - 0.08 ng/ml   CBC WITH AUTOMATED DIFF    Collection Time: 02/01/17  8:10 PM   Result Value Ref Range    WBC 8.5 4.3 - 11.1 K/uL    RBC 4.68 4.23 - 5.67 M/uL    HGB 14.0 13.6 - 17.2 g/dL    HCT 43.7 41.1 - 50.3 %    MCV 93.4 79.6 - 97.8 FL    MCH 29.9 26.1 - 32.9 PG    MCHC 32.0 31.4 - 35.0 g/dL    RDW 14.9 (H) 11.9 - 14.6 %    PLATELET 776 (L) 922 - 450 K/uL    MPV 10.5 (L) 10.8 - 14.1 FL    DF AUTOMATED      NEUTROPHILS 84 (H) 43 - 78 %    LYMPHOCYTES 8 (L) 13 - 44 %    MONOCYTES 8 4.0 - 12.0 %    EOSINOPHILS 0 (L) 0.5 - 7.8 %    BASOPHILS 0 0.0 - 2.0 %    IMMATURE GRANULOCYTES 0.4 0.0 - 5.0 %    ABS. NEUTROPHILS 7.1 1.7 - 8.2 K/UL    ABS. LYMPHOCYTES 0.6 0.5 - 4.6 K/UL    ABS. MONOCYTES 0.7 0.1 - 1.3 K/UL    ABS. EOSINOPHILS 0.0 0.0 - 0.8 K/UL    ABS. BASOPHILS 0.0 0.0 - 0.2 K/UL    ABS. IMM.  GRANS. 0.0 0.0 - 0.5 K/UL   METABOLIC PANEL, COMPREHENSIVE    Collection Time: 02/01/17  8:10 PM   Result Value Ref Range    Sodium 138 136 - 145 mmol/L    Potassium 4.0 3.5 - 5.1 mmol/L    Chloride 101 98 - 107 mmol/L    CO2 31 21 - 32 mmol/L    Anion gap 6 (L) 7 - 16 mmol/L    Glucose 143 (H) 65 - 100 mg/dL    BUN 16 8 - 23 MG/DL    Creatinine 0.99 0.8 - 1.5 MG/DL    GFR est AA >60 >60 ml/min/1.73m2    GFR est non-AA >60 >60 ml/min/1.73m2    Calcium 8.4 8.3 - 10.4 MG/DL    Bilirubin, total 0.6 0.2 - 1.1 MG/DL    ALT (SGPT) 34 12 - 65 U/L    AST (SGOT) 27 15 - 37 U/L    Alk. phosphatase 77 50 - 136 U/L    Protein, total 7.0 6.3 - 8.2 g/dL    Albumin 3.5 3.2 - 4.6 g/dL    Globulin 3.5 2.3 - 3.5 g/dL    A-G Ratio 1.0 (L) 1.2 - 3.5     TSH 3RD GENERATION    Collection Time: 02/01/17  8:10 PM   Result Value Ref Range    TSH 0.907 0.358 - 3.740 uIU/mL   LIPASE    Collection Time: 02/01/17  8:10 PM   Result Value Ref Range    Lipase 159 73 - 393 U/L   POC LACTIC ACID    Collection Time: 02/01/17  8:20 PM   Result Value Ref Range    Lactic Acid (POC) 1.2 0.5 - 1.9 mmol/L           Assessment:     Active Problems:    Acute encephalopathy (2/1/2017)      ? CVA vs dehydration vs infection. Less likely infection as afebrile and normal WBC. Plan:     Admit to remote tele  IVF  MRI in AM if valve is MRI safe, Carotids, recent echo  Increase aspirin to 325 mg daily  Cont plavix  Hold sedating meds  PT/OT  DNR  DC in 2-3 days.      Signed By: Yoav Carnes DO     February 1, 2017

## 2017-02-02 NOTE — PROGRESS NOTES
Patient transferred from the ER at [facility] with his son. Patient is alert and oriented, but does do some repetitive babbling. He will answer questions appropriately, but he will repeat the same phrase. Patient temperature was 101.3, given 650mg of Tylenol po. He complained of insomnia and was given 50mg of Trazadone. Dual skin assessment done with Xiomy Ferrari, no open areas of wounds. Skin warm, dry, and intact.

## 2017-02-02 NOTE — INTERDISCIPLINARY ROUNDS
Interdisciplinary team rounds were held 2/2/2017 with the following team members:Nursing and Physician. Plan of care discussed. See clinical pathway and/or care plan for interventions and desired outcomes.

## 2017-02-02 NOTE — PROGRESS NOTES
Problem: Self Care Deficits Care Plan (Adult)  Goal: *Acute Goals and Plan of Care (Insert Text)  1. Patient will complete lower body bathing and dressing with supervision and adaptive equipment as needed. 2. Patient will complete toileting with supervision. 3. Patient will tolerate 30 minutes of OT treatment with 2 rest breaks to increase activity tolerance for ADLs. 4. Patient will complete functional transfers with supervision and adaptive equipment as needed. Timeframe: 7 visits       OCCUPATIONAL THERAPY: Initial Assessment, Daily Note and Treatment Day: 1st 2/2/2017  INPATIENT: Hospital Day: 2  [place] / [place] / Product Type: Medicare /      NAME/AGE/GENDER: Blossom Peralta is a 80 y.o. male      PRIMARY DIAGNOSIS:  Acute encephalopathy Acute encephalopathy Acute encephalopathy        ICD-10: Treatment Diagnosis:        · Generalized Muscle Weakness (M62.81)  · Other lack of cordination (R27.8)  · Dizziness and Giddiness (R42)   Precautions/Allergies:         Review of patient's allergies indicates no known allergies. ASSESSMENT:      Mr. Jess Mejia presents sitting in chair, daughter at side, MD present, agreeable to therapy. At baseline, pt lives alone and is independent with self care. Currently, Pt is oriented x 4, BUE ROM WFLs, and BUE strength decreased but functional.  Pt presents with decreased activity, and decreased independence for self care, functional mobility, and ADL's. The Neo Cognitive Assessment (MoCA) is indicated to assist with recognizing changes in cognitive function after an illness or injury. The patient reports falling x 2 prior to admission. A MRI is unable to be performed secondary to pt having hardware in chest.  The MoCA assesses different cognitive domains including: attention and concentration, executive functions, memory, language, visuoconstructional skills, conceptual thinking, calculations, and orientation.   The pt scored 16/30 indicating cognitive impairment. Pt did well with orientation and abstraction, fair with attention and naming, and poorly in all other areas especially recall and executive functioning. Executive functions help you manage life tasks necessary to one's independence. Mr. Tomeka Mayberry appears to use sarcasm and joking as a way to cope with his present cognitive deficits. He requires skilled OT to maximize independence with self care tasks and functional transfers. Pt left with daughter by side, lunch tray setup and all needs in reach, pt instructed to call for assistance; RN aware. Recommend supervision, possible rehab after discharge from acute setting. This section established at most recent assessment   PROBLEM LIST (Impairments causing functional limitations):  1. Decreased Strength  2. Decreased ADL/Functional Activities  3. Decreased Transfer Abilities  4. Decreased Ambulation Ability/Technique  5. Decreased Balance  6. Decreased Activity Tolerance  7. Decreased Work Simplification/Energy Conservation Techniques  8. Increased Fatigue  9. Decreased Knowledge of Precautions  10. Decreased [place] with Home Exercise Program  11. Decreased Cognition    INTERVENTIONS PLANNED: (Benefits and precautions of occupational therapy have been discussed with the patient.)  1. Activities of daily living training  2. Adaptive equipment training  3. Balance training  4. Cognitive training  5. Donning&doffing training  6. Group therapy  7. Theraputic activity  8. Theraputic exercise      TREATMENT PLAN: Frequency/Duration: Follow patient 3x/week to address above goals. Rehabilitation Potential For Stated Goals: GOOD      RECOMMENDED REHABILITATION/EQUIPMENT: (at time of discharge pending progress): Continue Skilled Therapy and Discussed with Case Management. OCCUPATIONAL PROFILE AND HISTORY:   History of Present Injury/Illness (Reason for Referral): Jamie Lopez is a 80 y.o.  male who presents with weakness and frequent falls. This AM he was found down on floor and called EMS for help. He then was checked on by family later in the day and he was on the floor again. He cannot remember why he fell. He admits to neuropathy. His son says he is more confused although he is A&Ox3 and will answer some questions, however, he keeps repeating \"alrighty I don't know\" over and over again when asked what happened to him. No focal neuro deficits per patient. UA is pending. WBC normal, lactic acid normal, CT head negative. Is on plavix and baby aspirin following CABG and AV replacement a few months ago. Past Medical History/Comorbidities:   Mr. Trini Everett  has a past medical history of Aortic valve stenosis, nonrheumatic (4/25/2016); Arthritis; Cor pulmonale ([facility] [address] Cyst near tailbone; DDD (degenerative disc disease), lumbar; Depression; Diverticulosis; Edema (04/25/2016); Former cigarette smoker; Genital edema, male (10/31/2016); Heart murmur; Hepatitis B; High cholesterol; History of colon polyps (5/13/2013); History of hepatitis A; History of staph infection; Long term (current) use of anticoagulants; Lumbago; SOB (shortness of breath); Spinal stenosis; TIA (transient ischemic attack) (5/13/2013); Transient ischemic attack (TIA) (2002); and Unspecified sleep apnea (05/21/2013). Mr. Trini Everett  has a past surgical history that includes cardiac surg procedure unlist; orthopaedic; hernia repair (Left); heart catheterization; colonoscopy; carotid endarterectomy (Left, 2002); and back surgery.   Social History/Living Environment:   Home Environment: Private residence  # Steps to Enter: 2  Rails to Enter: Yes  Hand Rails : Bilateral  Wheelchair Ramp: No  One/Two Story Residence: Two story, live on 1st floor  # of Interior Steps: 12  Lift Chair Available: Yes  Living Alone: Yes  Support Systems: Family member(s)  Patient Expects to be Discharged to[de-identified] Private residence  Current DME Used/Available at Home: Walker, rolling  Tub or Shower Type: Tub/Shower combination  Prior Level of Function/Work/Activity:  independent      Number of Personal Factors/Comorbidities that affect the Plan of Care: Extensive review of physical, cognitive, and psychosocial performance (3+):  HIGH COMPLEXITY   ASSESSMENT OF OCCUPATIONAL PERFORMANCE[de-identified]   Activities of Daily Living:         Requires assistance  Basic ADLs (From Assessment) Complex ADLs (From Assessment)   Basic ADL  Feeding: Modified independent  Oral Facial Hygiene/Grooming: Modified Independent  Bathing: Minimum assistance  Upper Body Dressing: Modified independent  Lower Body Dressing: Contact guard assistance  Toileting: Contact guard assistance Instrumental ADL  Meal Preparation: Minimum assistance  Homemaking: Minimum assistance  Medication Management: Moderate assistance  Financial Management: Moderate assistance   Grooming/Bathing/Dressing Activities of Daily Living     Cognitive Retraining  Orientation Retraining: Situation  Problem Solving: Identifying the problem; Identifying the task;General alternative solution; Inductive reason  Executive Functions: Executing cognitive plans;Managing time  Organizing/Sequencing: Breaking task down; Three step sequence  Attention to Task: Distractibility  Safety/Judgement: Fall prevention  Cues: Verbal cues provided;Visual cues provided                 Functional Transfers  Toilet Transfer : Minimum assistance; Moderate assistance  Tub Transfer: Moderate assistance     Bed/Mat Mobility  Rolling: Supervision  Supine to Sit: Minimum assistance  Sit to Supine:  (not tested, pt up in chair following treatment)  Sit to Stand: Moderate assistance  Bed to Chair: Minimum assistance          Most Recent Physical Functioning:   Gross Assessment:  AROM: Within functional limits  Strength: Generally decreased, functional               Posture:  Posture (WDL): Exceptions to WDL  Posture Assessment:  Forward head, Rounded shoulders, Increased, Trunk flexion  Balance:  Sitting: Impaired  Sitting - Static: Good (unsupported)  Sitting - Dynamic: Fair (occasional)  Standing: Impaired  Standing - Static: Fair  Standing - Dynamic : Poor Bed Mobility:  Rolling: Supervision  Supine to Sit: Minimum assistance  Sit to Supine:  (not tested, pt up in chair following treatment)  Wheelchair Mobility:     Transfers:  Sit to Stand: Moderate assistance  Stand to Sit: Minimum assistance  Bed to Chair: Minimum assistance                 Patient Vitals for the past 6 hrs:       BP SpO2 Pulse   17 0912 132/58 - 74   17 1117 128/84 98 % 74        Mental Status  Neurologic State: Alert  Orientation Level: Oriented to person, Oriented to place, Oriented to time  Cognition: Follows commands  Perception: Appears intact  Perseveration: No perseveration noted  Safety/Judgement: Fall prevention                               Physical Skills Involved:  1. Balance  2. Mobility  3. Strength  4. Endurance Cognitive Skills Affected (resulting in the inability to perform in a timely and safe manner):  1. Attending  2. Perceiving  3. Thinking  4. Understanding  5. Problem Solving  6. Mental Sequencing  7. Learning  8. Remembering Psychosocial Skills Affected:  1. Interpersonal Interactions  2. Habits  3. Routines and Behaviors  4. Active Use of Coping Strategies  5. Environmental Adaptations   Number of elements that affect the Plan of Care: 5+:  HIGH COMPLEXITY   CLINICAL DECISION MAKIN [facility] Inpatient Short Form  How much help from another person does the patient currently need. .. Total A Lot A Little None   1. Putting on and taking off regular lower body clothing?   [ ] 1   [ ] 2   [X] 3   [ ] 4   2. Bathing (including washing, rinsing, drying)? [ ] 1   [ ] 2   [X] 3   [ ] 4   3. Toileting, which includes using toilet, bedpan or urinal?   [ ] 1   [ ] 2   [X] 3   [ ] 4   4.   Putting on and taking off regular upper body clothing?   [ ] 1   [ ] 2   [ ] 3   [X] 4   5. Taking care of personal grooming such as brushing teeth? [ ] 1   [ ] 2   [X] 3   [ ] 4   6. Eating meals? [ ] 1   [ ] 2   [ ] 3   [X] 4   © 2007, Trustees of [address] [place] [address] [address] [id number], under license to [employer]. All rights reserved    Score:  Initial: 20 Most Recent: X (Date: -- )     Interpretation of Tool:  Represents activities that are increasingly more difficult (i.e. Bed mobility, Transfers, Gait). Score 24 23 22-20 19-15 14-10 9-7 6       Modifier CH CI CJ CK CL CM CN         · Self Care:               - CURRENT STATUS:    CJ - 20%-39% impaired, limited or restricted               - GOAL STATUS:           CI - 1%-19% impaired, limited or restricted               - D/C STATUS:                       ---------------To be determined---------------  [place] / Product Type: Medicare /       Medical Necessity:     · Patient demonstrates good rehab potential due to higher previous functional level. Reason for Services/Other Comments:  · Patient continues to require skilled intervention due to decreased ability to perform self care. Use of outcome tool(s) and clinical judgement create a POC that gives a: MODERATE COMPLEXITY             TREATMENT:   (In addition to Assessment/Re-Assessment sessions the following treatments were rendered)      Pre-treatment Symptoms/Complaints: Decreased ability to perform ADLs, self care, and functional mobility; decreased tolerance for activities  Pain: Initial:   Pain Intensity 1: 0  Post Session:  0      Cognitive Skills Development: (25 minutes): Procedure(s) (per grid) utilized to improve and/or restore cognitive functioning as related to attention to tasks, problem solving skills, memory, ability to follow numerous steps in a process, ability to perform in a logical sequence, ability to compute, safety awareness, compensatory strategies and self awareness.  Required moderate verbal cueing to facilitate improve recall of information, perform graded activities focusing on attention skills and improve ability to perform graded processes in steps. Assessment     Braces/Orthotics/Lines/Etc:   · O2 Device: Room air  Treatment/Session Assessment:    · Response to Treatment:  Agrees to OT  · Interdisciplinary Collaboration:  · Physical Therapist  · Occupational Therapist  · Registered Nurse  · Physician  · After treatment position/precautions:  · Up in chair  · Bed/Chair-wheels locked  · Bed in low position  · Call light within reach  · RN notified  · Family at bedside  · Compliance with Program/Exercises: Will assess as treatment progresses. · Recommendations/Intent for next treatment session: \"Next visit will focus on advancements to more challenging activities and reduction in assistance provided\".   Total Treatment Duration:  OT Patient Time In/Time Out  Time In: 1147  Time Out: 8260 [facility] ANGELICA Andrade/FIDELINA

## 2017-02-02 NOTE — PROGRESS NOTES
TRANSFER - IN REPORT:    Verbal report received from TR Medina (name) on Sherryle Rinks  being received from ER (unit) for routine progression of care      Report consisted of patients Situation, Background, Assessment and   Recommendations(SBAR). Information from the following report(s) SBAR, ED Summary and Recent Results  was reviewed with the receiving nurse. Opportunity for questions and clarification was provided. Assessment completed upon patients arrival to unit and care assumed.

## 2017-02-02 NOTE — PROGRESS NOTES
Problem: Mobility Impaired (Adult and Pediatric)  Goal: *Acute Goals and Plan of Care (Insert Text)  LTG:  (1.)Mr. Emely Arevalo will move from supine to sit and sit to supine , scoot up and down and roll side to side in bed with MODIFIED INDEPENDENCE within 7 day(s). (2.)Mr. Emely Arevalo will transfer from bed to chair and chair to bed with MODIFIED INDEPENDENCE using the least restrictive device within 7 day(s). (3.)Mr. Emely Arevalo will ambulate with MODIFIED INDEPENDENCE for >yg=610 feet with the least restrictive device, appropriate gait pattern and good dynamic standing balance within 7 day(s). (4.)Mr. Emely Arevalo will ascend/descend 2 steps with 1-2 handrail(s) and CGA within 7 day(s) for safe entry/exit of home. (5.)Mr. Emely Arevalo will perform B LE therapeutic exercises x 20 reps with SUPERVISION within 7 days to increase strength for improved safety and independence in transfers and gait.  ________________________________________________________________________________________________      PHYSICAL THERAPY: INITIAL ASSESSMENT 2/2/2017  INPATIENT: Hospital Day: 2  [place] / [place] / Product Type: Medicare /      NAME/AGE/GENDER: Isauro Don is a 80 y.o. male      PRIMARY DIAGNOSIS: Acute encephalopathy Acute encephalopathy Acute encephalopathy        ICD-10: Treatment Diagnosis:       · Generalized Muscle Weakness (M62.81)  · Difficulty in walking, Not elsewhere classified (R26.2)  · Repeated Falls (R29.6)  · History of falling (Z91.81)   Precaution/Allergies:  Review of patient's allergies indicates no known allergies. ASSESSMENT:      Mr. Emely Arevalo presents supine in bed with son and daughter at bedside. Pt agreeable to treatment but also argumentative to certain points. Pt very confident that he can go home \"today\" and that he does not need any help in the home. Son and daughter verbalize that they could increase the level of supervision in the home to help as needed.  At baseline, pt was living alone, ambulating with straight cane, walker or no assistive device. Pt was independent with all ADLs. Family did driving to and from grocery store/MD appointments. Pt has experienced 2 recent falls. Currently, pt is admitted with acute encephalopathy and required min, to occasional mod assist, for balance and functional mobility. At current functional level, pt would not be safe to return to independent living. Pt would be a high fall risk as he does not fully recognize his deficits. Strongly recommend increased level of supervision in the home with HHPT or possibly a short term rehab stay. Of note, pt is currently opposed to both. Will cont' to address deficits with hopes of helping pt return to his baseline level of functioning. This section established at most recent assessment   PROBLEM LIST (Impairments causing functional limitations):  1. Decreased Strength  2. Decreased ADL/Functional Activities  3. Decreased Transfer Abilities  4. Decreased Ambulation Ability/Technique  5. Decreased Balance  6. Increased Pain  7. Decreased Activity Tolerance  8. Increased Shortness of Breath  9. Decreased [place] with Home Exercise Program    INTERVENTIONS PLANNED: (Benefits and precautions of physical therapy have been discussed with the patient.)  1. Balance Exercise  2. Bed Mobility  3. Family Education  4. Home Exercise Program (HEP)  5. Therapeutic Activites  6. Therapeutic Exercise/Strengthening  7. Transfer Training  8. Group Therapy      TREATMENT PLAN: Frequency/Duration: 3 times a week for duration of hospital stay  Rehabilitation Potential For Stated Goals: GOOD      RECOMMENDED REHABILITATION/EQUIPMENT: (at time of discharge pending progress): Continue Skilled Therapy. HISTORY:   History of Present Injury/Illness (Reason for Referral):  Per chart: Dustin Xie is a 80 y.o.  male who presents with weakness and frequent falls.  This AM he was found down on floor and called EMS for help. He then was checked on by family later in the day and he was on the floor again. He cannot remember why he fell. He admits to neuropathy. His son says he is more confused although he is A&Ox3 and will answer some questions, however, he keeps repeating \"alrighty I don't know\" over and over again when asked what happened to him. No focal neuro deficits per patient. UA is pending. WBC normal, lactic acid normal, CT head negative. Is on plavix and baby aspirin following CABG and AV replacement a few months ago. Past Medical History/Comorbidities:   Mr. Isidra Stovall  has a past medical history of Aortic valve stenosis, nonrheumatic (4/25/2016); Cyst near tailbone; Edema (04/25/2016); Former cigarette smoker; Genital edema, male (10/31/2016); Heart murmur; History of colon polyps (5/13/2013); History of hepatitis A; Long term (current) use of anticoagulants; SOB (shortness of breath); TIA (transient ischemic attack) (5/13/2013); Transient ischemic attack (TIA) (2002); and Unspecified sleep apnea (05/21/2013). Mr. Isidra Stovall  has a past surgical history that includes cardiac surg procedure unlist; orthopaedic; hernia repair (Left); heart catheterization; colonoscopy; and carotid endarterectomy (Left, 2002). Social History/Living Environment:   Home Environment: Private residence  # Steps to Enter: 2  Rails to Enter: Yes  Hand Rails : Bilateral  Wheelchair Ramp: No  One/Two Story Residence: Two story, live on 1st floor  # of Interior Steps: 12  Lift Chair Available: Yes  Living Alone: Yes  Support Systems: Family member(s)  Patient Expects to be Discharged to[de-identified] Private residence  Current DME Used/Available at Home: Walker, rolling  Tub or Shower Type: Tub/Shower combination  Prior Level of Function/Work/Activity:  Pt lives alone. Daughter-in-law comes to house 3 times/week to check on pt and take him for appointments/groceries. Has Life Alert call system in place.  Ambulates with a straight cane or rolling walker. Independent with all ADLs with shower chair. 2 recent falls. Home is 2 levels but all needs are on main level. Number of Personal Factors/Comorbidities that affect the Plan of Care: 1-2: MODERATE COMPLEXITY   EXAMINATION:   Most Recent Physical Functioning:   Gross Assessment:  AROM: Generally decreased, functional  Strength: Generally decreased, functional  Sensation:  (decreased light touch B feet)               Posture:  Posture (WDL): Exceptions to WDL  Posture Assessment: Forward head, Rounded shoulders, Increased, Trunk flexion  Balance:  Sitting: Impaired  Sitting - Static: Good (unsupported)  Sitting - Dynamic: Fair (occasional)  Standing: Impaired  Standing - Static: Fair  Standing - Dynamic : Poor Bed Mobility:  Rolling: Supervision  Supine to Sit: Minimum assistance  Sit to Supine:  (not tested, pt up in chair following treatment)  Wheelchair Mobility:     Transfers:  Sit to Stand: Moderate assistance  Stand to Sit: Minimum assistance  Bed to Chair: Minimum assistance  Gait:     Base of Support: Widened  Speed/[place]: Slow;Shuffled  Step Length: Right shortened;Left shortened  Distance (ft): 3 Feet (ft)  Ambulation - Level of Assistance: Minimal assistance  Interventions: Safety awareness training;Verbal cues       Body Structures Involved:  1. Joints  2. Muscles Body Functions Affected:  1. Neuromusculoskeletal  2. Movement Related Activities and Participation Affected:  1. General Tasks and Demands  2. Mobility  3. Self Care   Number of elements that affect the Plan of Care: 4+: HIGH COMPLEXITY   CLINICAL PRESENTATION:   Presentation: Evolving clinical presentation with changing clinical characteristics: MODERATE COMPLEXITY   CLINICAL DECISION MAKIN [facility] Inpatient Short Form  How much difficulty does the patient currently have. .. Unable A Lot A Little None   1. Turning over in bed (including adjusting bedclothes, sheets and blankets)? [ ] 1   [ ] 2   [X] 3   [ ] 4   2. Sitting down on and standing up from a chair with arms ( e.g., wheelchair, bedside commode, etc.)   [ ] 1   [ ] 2   [X] 3   [ ] 4   3. Moving from lying on back to sitting on the side of the bed? [ ] 1   [ ] 2   [X] 3   [ ] 4   How much help from another person does the patient currently need. .. Total A Lot A Little None   4. Moving to and from a bed to a chair (including a wheelchair)? [ ] 1   [ ] 2   [X] 3   [ ] 4   5. Need to walk in hospital room? [ ] 1   [X] 2   [ ] 3   [ ] 4   6. Climbing 3-5 steps with a railing? [ ] 1   [X] 2   [ ] 3   [ ] 4   © 2007, Trustees of [address] [place] [id number], under license to [employer]. All rights reserved    Score:  Initial: 16 Most Recent: X (Date: -- )     Interpretation of Tool:  Represents activities that are increasingly more difficult (i.e. Bed mobility, Transfers, Gait). Score 24 23 22-20 19-15 14-10 9-7 6       Modifier CH CI CJ CK CL CM CN         · Mobility - Walking and Moving Around:               - CURRENT STATUS:    CK - 40%-59% impaired, limited or restricted               - GOAL STATUS:           CK - 40%-59% impaired, limited or restricted               - D/C STATUS:                       ---------------To be determined---------------  [place] / [place] / Product Type: Medicare /       Medical Necessity:     · Patient demonstrates good rehab potential due to higher previous functional level. Reason for Services/Other Comments:  · Patient continues to require present interventions due to patient's inability to function at baseline.    Use of outcome tool(s) and clinical judgement create a POC that gives a: Questionable prediction of patient's progress: MODERATE COMPLEXITY                 TREATMENT:   (In addition to Assessment/Re-Assessment sessions the following treatments were rendered)   Pre-treatment Symptoms/Complaints:  \" They can take this oxygen off me right now. I don't need it. I can go home. \"  Pain: Initial:   Pain Intensity 1: 4 (pt reports this is chronic back pain and he doesn't want med)  Pain Intervention(s) 1: Ambulation/Increased Activity, Repositioned, Emotional support  Post Session:  4/10      Assessment/Reassessment only, no treatment provided today     Braces/Orthotics/Lines/Etc:   · IV  · O2 via NC 3 L / min  Treatment/Session Assessment:    · Response to Treatment:  No treatment provided. · Interdisciplinary Collaboration:  · Physical Therapist  · Registered Nurse  · After treatment position/precautions:  · Up in chair  · Bed/Chair-wheels locked  · Bed in low position  · Call light within reach  · RN notified  · Family at bedside  · Compliance with Program/Exercises: Will assess as treatment progresses. · Recommendations/Intent for next treatment session: \"Next visit will focus on advancements to more challenging activities and reduction in assistance provided\".   Total Treatment Duration:  PT Patient Time In/Time Out  Time In: 0923  Time Out: [address] [place], PT

## 2017-02-02 NOTE — PROGRESS NOTES
Patient transported to radiology holding bay with nasal oxygen cannula in place but patient keeps taking nasal cannula off. Patient refuses to keep oxygen via nasal cannula inplace. Oxygen saturation checked 89%, patient informed of such and informed it is best if patient has oxygen in place. Patient agreeable to put oxygen back on.

## 2017-02-02 NOTE — ED TRIAGE NOTES
EMS states \"patient lives at home alone and he fell this morning and the family went back later to check on him and he was in the floor again but stated he didn't really fall but eased himself down. Patient states he didn't press his fall button because he already pressed it once today.   Patient is weaker than usual and repeating himself some\"

## 2017-02-02 NOTE — PROGRESS NOTES
Patient remains alert and oriented this morning. He is voiding in the urinal, with no issues. He states feeling more himself this morning. When filling out the the paperwork for an MRI he stated that he was told to never have an MRI with his heart surgeries. No other issues or complaints this morning. Temperature has remained within normal limits this morning.

## 2017-02-02 NOTE — PROGRESS NOTES
Care Management Interventions  PCP Verified by CM: Yes  Transition of Care Consult (CM Consult): Discharge Planning  Physical Therapy Consult: Yes  Occupational Therapy Consult: Yes  Current Support Network: Lives Alone  Confirm Follow Up Transport: Family  Plan discussed with Pt/Family/Caregiver: Yes   Prior to admission patient lives alone has walker and cane he can use if needed but does not like to use them. He has a son and daughter that near him. Patient daughter Martha Nielson [phone] checks on patient and assist him as needed. PT/OT are working with patient and PPD ordered for possible SNF or home health if patient will agree.  Will follow progress

## 2017-02-02 NOTE — ED PROVIDER NOTES
HPI Comments: 68-year-old male presents with generalized weakness and mental status changes. According to son, he fell twice a day. The second episode, patient states he just \"laid himself down. \"  Son thought he may be dehydrated, so encouraged him to drink more. He has had productive cough and congestion for the past few days. He has felt warm, but has had no documented fevers. Patient denies shortness of breath, urinary changes/pain, or diarrhea. He has chronic back pain that is unchanged. No new medications, but was taken off Lasix, amio, and metoprolol a few days ago. He's had some repetitive speech and confusion today. Son says patient vomited once this afternoon, and it appeared to look like breakfast. Patient's wife  in May, but has daily visits by his son and his wife. Pt had 10/26/16 (Dr Preeti Xavier) Aortic valve replacement with a 25 mm Magna Ease Kaleb-  Sterling pericardial valve.   Coronary artery bypass grafting x2. Patient is a 80 y.o. male presenting with lethargy. The history is provided by the patient. Lethargy   Pertinent negatives include no chest pain, no abdominal pain, no headaches and no shortness of breath.         Past Medical History:   Diagnosis Date    Aortic valve stenosis, nonrheumatic 2016    Cyst near tailbone      Pilondial cyst    Edema 2016     mostly RLE    Former cigarette smoker     Genital edema, male 10/31/2016    Heart murmur     History of colon polyps 2013    History of hepatitis A      about 40 years ago    Long term (current) use of anticoagulants      Plavix and Aspirin 325mg    SOB (shortness of breath)     TIA (transient ischemic attack) 2013    Transient ischemic attack (TIA) 2002    Unspecified sleep apnea 2013     pt denies       Past Surgical History:   Procedure Laterality Date    Pr cardiac surg procedure unlist      Hx orthopaedic       discectomy x3    Hx hernia repair Left      L inguinal    Hx heart catheterization      Hx colonoscopy      Hx carotid endarterectomy Left 2002         Family History:   Problem Relation Age of Onset    Cancer Mother      colon ca    Diabetes Mother     Hypertension Mother     Cancer Father      vocal cord ca       Social History     Social History    Marital status:      Spouse name: N/A    Number of children: N/A    Years of education: N/A     Occupational History    Not on file. Social History Main Topics    Smoking status: Former Smoker     Packs/day: 2.00     Years: 35.00     Quit date: 1/21/2004    Smokeless tobacco: Never Used    Alcohol use Yes      Comment: very occassionally    Drug use: No    Sexual activity: Not on file     Other Topics Concern    Not on file     Social History Narrative         ALLERGIES: Review of patient's allergies indicates no known allergies. Review of Systems   Constitutional: Positive for fatigue. Negative for fever. HENT: Negative for facial swelling. Eyes: Negative for visual disturbance. Respiratory: Positive for cough. Negative for shortness of breath. Cardiovascular: Negative for chest pain. Gastrointestinal: Positive for vomiting. Negative for abdominal pain, blood in stool, diarrhea and nausea. Genitourinary: Negative for difficulty urinating and dysuria. Musculoskeletal: Positive for back pain. Skin: Negative for rash. Neurological: Negative for syncope, weakness, numbness and headaches. Psychiatric/Behavioral: Positive for confusion. Vitals:    02/01/17 2110 02/01/17 2112 02/01/17 2113 02/01/17 2114   BP: 174/77  139/63 131/69   Pulse: 94 89 86 84   Resp:    (!) 46   Temp:       SpO2:  96% (!) 84% 92%   Weight:       Height:                Physical Exam   Constitutional: He is oriented to person, place, and time. He appears well-developed and well-nourished. HENT:   Head: Normocephalic and atraumatic.    Right Ear: External ear normal.   Left Ear: External ear normal. Nose: Nose normal.   Mouth/Throat: Oropharynx is clear and moist.   Eyes: Conjunctivae are normal. Pupils are equal, round, and reactive to light. Neck: Normal range of motion. Neck supple. Cardiovascular: Regular rhythm, normal heart sounds and intact distal pulses. Pulmonary/Chest: Effort normal and breath sounds normal. No respiratory distress. He has no wheezes. Abdominal: Soft. Bowel sounds are normal. He exhibits no distension. There is no tenderness. Musculoskeletal: Normal range of motion. He exhibits no edema. Neurological: He is alert and oriented to person, place, and time. He has normal strength. He displays no tremor. No cranial nerve deficit or sensory deficit. Orofacial twitching, son says is old. Patient constantly repeating, and almost singing \"I don't know, I just don't know\"   Skin: Skin is warm and dry. Psychiatric: He is slowed and withdrawn. Nursing note and vitals reviewed. MDM  Number of Diagnoses or Management Options  Diagnosis management comments: Parts of this document were created using dragon voice recognition software. The chart has been reviewed but errors may still be present. 10:19 PM  Patient had an episode of profuse vomiting. Zofran ordered. Still has not urinated. No leukocytosis, normal lactic acid. No current indication for antibiotics. Afebrile. Head CT unremarkable. Still awaiting urine, TSH, and flu swab. Discussed with hospitalist for admission, acute delirium.          Amount and/or Complexity of Data Reviewed  Clinical lab tests: ordered and reviewed (Results for orders placed or performed during the hospital encounter of 02/01/17  -CBC WITH AUTOMATED DIFF       Result                                            Value                         Ref Range                       WBC                                               8.5                           4.3 - 11.1 K/uL                 RBC 4.68                          4.23 - 5.67 M/uL                HGB                                               14.0                          13.6 - 17.2 g/dL                HCT                                               43.7                          41.1 - 50.3 %                   MCV                                               93.4                          79.6 - 97.8 FL                  MCH                                               29.9                          26.1 - 32.9 PG                  MCHC                                              32.0                          31.4 - 35.0 g/dL                RDW                                               14.9 (H)                      11.9 - 14.6 %                   PLATELET                                          137 (L)                       150 - 450 K/uL                  MPV                                               10.5 (L)                      10.8 - 14.1 FL                  DF                                                AUTOMATED                                                     NEUTROPHILS                                       84 (H)                        43 - 78 %                       LYMPHOCYTES                                       8 (L)                         13 - 44 %                       MONOCYTES                                         8                             4.0 - 12.0 %                    EOSINOPHILS                                       0 (L)                         0.5 - 7.8 %                     BASOPHILS                                         0                             0.0 - 2.0 %                     IMMATURE GRANULOCYTES                             0.4                           0.0 - 5.0 %                     ABS. NEUTROPHILS                                  7.1                           1.7 - 8.2 K/UL                  ABS.  LYMPHOCYTES                                  0.6                           0.5 - 4.6 K/UL ABS. MONOCYTES                                    0.7                           0.1 - 1.3 K/UL                  ABS. EOSINOPHILS                                  0.0                           0.0 - 0.8 K/UL                  ABS. BASOPHILS                                    0.0                           0.0 - 0.2 K/UL                  ABS. IMM.  GRANS.                                  0.0                           0.0 - 0.5 K/UL             -METABOLIC PANEL, COMPREHENSIVE       Result                                            Value                         Ref Range                       Sodium                                            138                           136 - 145 mmol/L                Potassium                                         4.0                           3.5 - 5.1 mmol/L                Chloride                                          101                           98 - 107 mmol/L                 CO2                                               31                            21 - 32 mmol/L                  Anion gap                                         6 (L)                         7 - 16 mmol/L                   Glucose                                           143 (H)                       65 - 100 mg/dL                  BUN                                               16                            8 - 23 MG/DL                    Creatinine                                        0.99                          0.8 - 1.5 MG/DL                 GFR est AA                                        >60                           >60 ml/min/1.73m2               GFR est non-AA                                    >60                           >60 ml/min/1.73m2               Calcium                                           8.4                           8.3 - 10.4 MG/DL                Bilirubin, total                                  0.6                           0.2 - 1.1 MG/DL ALT (SGPT)                                        34                            12 - 65 U/L                     AST (SGOT)                                        27                            15 - 37 U/L                     Alk. phosphatase                                  77                            50 - 136 U/L                    Protein, total                                    7.0                           6.3 - 8.2 g/dL                  Albumin                                           3.5                           3.2 - 4.6 g/dL                  Globulin                                          3.5                           2.3 - 3.5 g/dL                  A-G Ratio                                         1.0 (L)                       1.2 - 3.5                  -POC LACTIC ACID       Result                                            Value                         Ref Range                       Lactic Acid (POC)                                 1.2                           0.5 - 1.9 mmol/L           -POC TROPONIN-I       Result                                            Value                         Ref Range                       Troponin-I (POC)                                  0.02                          0.0 - 0.08 ng/ml           -EKG, 12 LEAD, INITIAL       Result                                            Value                         Ref Range                       Systolic BP                                                                     mmHg                            Diastolic BP                                                                    mmHg                            Ventricular Rate                                  86                            BPM                             Atrial Rate                                       86                            BPM                             P-R Interval                                      166                           ms QRS Duration                                      162                           ms                              Q-T Interval                                      378                           ms                              QTC Calculation (Bezet)                           452                           ms                              Calculated P Axis                                 57                            degrees                         Calculated R Axis                                 -66                           degrees                         Calculated T Axis                                 44                            degrees                         Diagnosis                                                                                                   !! AGE AND GENDER SPECIFIC ECG ANALYSIS !! Normal sinus rhythm   Right bundle branch block   Left anterior fascicular block   !!! Bifascicular block !!! Possible Lateral infarct , age undetermined   Abnormal ECG   When compared with ECG of 27-OCT-2016 07:04,   Left anterior fascicular block is now Present   Nonspecific T wave abnormality, worse in Inferior leads   Nonspecific T wave abnormality now evident in Anterior leads   QT has shortened   Confirmed by Raji Lee MD (), ROSHAN CORDON ([id number]) on 2/1/2017 10:18:18 PM     )  Tests in the radiology section of CPT®: ordered and reviewed (Ct Head Wo Cont    Result Date: 2/1/2017  HEAD CT WITHOUT CONTRAST  2/1/2017 HISTORY:   Confusion/delirium, altered LOC, unexplained  symptoms present today TECHNIQUE: Noncontrast axial images were obtained through the brain. All CT scans at this facility used dose modulation, interactive reconstruction and/or weight based dosing when appropriate to reduce radiation dose to as low as reasonably achievable.  COMPARISON: 1/21/2013 FINDINGS: There is no acute intracranial hemorrhage, significant mass effect or CT evidence of acute large artery territorial infarction. Please note that a hyperacute infarct or small vessel infarct may not be apparent on initial CT imaging. Moderate diffuse cerebral volume loss is present. A few areas of low-attenuation are present in the periventricular white matter. This finding may be present with chronic small vessel ischemic disease. There is no hydrocephalus , intra-axial mass or abnormal extra-axial fluid collection. There are no displaced skull fractures. The mastoid air cells and paranasal sinuses are clear where imaged. IMPRESSION: 1. Cerebral volume loss. 2. No acute intracranial hemorrhage. Xr Chest Port    Result Date: 2/1/2017  AP PORTABLE CHEST X-RAY HISTORY: CV surgery weeks ago. Posterior chest pain with slight fever COMPARISON: 11/7/2016 FINDINGS: Median sternotomy wires are present. EKG leads are present. Lung volumes are diminished. There is no lobar consolidation or large pleural effusions.      IMPRESSION: Low lung volumes.    )  Review and summarize past medical records: yes  Independent visualization of images, tracings, or specimens: yes      ED Course       Procedures

## 2017-02-03 NOTE — PROGRESS NOTES
Lab called with a critical result at 2201, D-dimer 1.29. It was called into physician at . Dr. Juno Gan was notified, CT of abdomen and pelvis ordered.

## 2017-02-03 NOTE — ROUTINE PROCESS
Attempted echo. Patient is having trouble urinating and I told him that I will give him more time and come back soon.

## 2017-02-03 NOTE — PROGRESS NOTES
Hospitalist Progress Note    2/3/2017  Admit Date: 2017  7:57 PM   NAME: Jodi Weiss   :  1935   MRN:  [id number]   Attending: Danette Fierro DO  PCP:  Sofia Villalba MD      Admitted for: acute metabolic encephalopathy- fevers of unclear etiology with concern for sepsis    SUBJECTIVE:   As previously documented: \" Jodi Weiss is a 80 y.o.  male who presents with weakness and frequent falls. This AM he was found down on floor and called EMS for help. He then was checked on by family later in the day and he was on the floor again. He cannot remember why he fell. He admits to neuropathy. His son says he is more confused although he is A&Ox3 and will answer some questions, however, he keeps repeating \"alrighty I don't know\" over and over again when asked what happened to him. No focal neuro deficits per patient. UA is pending. WBC normal, lactic acid normal, CT head negative. Is on plavix and baby aspirin following CABG and AV replacement a few months ago ( 2016). \"        Hospital Course-  Pt admitted  fall and ams. Ajay Nasra twice but unknown how. Was confused- no slurred speech as per family- had a fever of 100.4 when ems came. Transferred here and admitted for concern for cva vs sepsis. Had fever of 101.3 this am and low grade fever again this am with confusion- so abx started. Unable to obtain an mri  As per the pt secondary to the heart valve replacement. Discussed with cards and vascular - pt can have the MRI- but he is adamant about it. Given that he is a poor historian and family does not know why he cant have a mri- will hold off. Pt himself cant recall what happened. Oriented to time, place & person by some higher cognitive deficits on his MoCA as per OT. He wears glasses, a hearing aid and dentures at baseline.       Review of Systems negative with exception of pertinent positives noted above  Past medical history unchanged from H&P    PHYSICAL EXAM     Visit Vitals  /65    Pulse 65    Temp 98.3 °F (36.8 °C)    Resp 19    Ht 5' 10\" (1.778 m)    Wt 101.6 kg (224 lb)    SpO2 97%    BMI 32.14 kg/m2      Temp (24hrs), Av.4 °F (36.9 °C), Min:98 °F (36.7 °C), Max:98.9 °F (37.2 °C)    Oxygen Therapy  O2 Sat (%): 97 % (17 1130)  Pulse via Oximetry: 95 beats per minute (17 0007)  O2 Device: Nasal cannula (17 152)  O2 Flow Rate (L/min): 3 l/min (17 152)    Intake/Output Summary (Last 24 hours) at 17 1249  Last data filed at 17 0309   Gross per 24 hour   Intake                0 ml   Output              300 ml   Net             -300 ml        General: No acute distress  mucous membranes pink and moist acyanotic anicteric  Neck:  Supple full range of motion  Lungs:  Air entry equal bilaterally, no crepitations rales rhonchi   Heart:  Regular rate and rhythm,  No murmur, rub, or gallop  Abdomen: Soft, Non distended, Non tender, no rebound guarding Positive bowel sounds  Extremities: No cyanosis, clubbing or edema  Neurologic:  No focal deficits  Musculoskeletal: no   Joint swelling tenderness erythema  Skin:  No erythema, rashes noted          LAB  No results for input(s): GLUCPOC in the last 72 hours.     No lab exists for component: [address]      17   0655   WBC  5.9   HGB  12.8*   HCT  41.1   PLT  126*     Recent Labs      17   0215  17   NA  141   --   138   K  3.8   --   4.0   CL  102   --   101   CO2  30   --   31   GLU  98   --   143*   BUN  13   --   16   CREA  0.79*   --   0.99   MG   --   2.2   --    CA  7.8*   --   8.4   TROIQ   --   0.03   --    ALB   --    --   3.5   TBILI   --    --   0.6   ALT   --    --   34   SGOT   --    --   27   BNPP   --   85   --        EKG and imaging reviewed personally by me  Ct Chest Abd Pelv W Cont    Result Date: 2/3/2017  Exam: CT angiography of the chest and CT abdomen and pelvis with coronal reformats following administration of 100 cc Isovue 370 IV contrast. The mA was adjusted using dose modulation to reduce patient radiation exposure. Indication: Shortness of breath with elevated d-dimer and fever Comparison: None available Findings: CTA chest: No evidence of pulmonary artery filling defects. Suboptimal evaluation the distal segmental and subsegmental pulmonary arteries due to mild respiratory motion. No evidence of acute aortic injury. Mild centrilobular emphysematous change. There is asymmetric elevation of the right hemidiaphragm with mild overlying atelectasis, difficult to completely exclude a subtle infiltrate in the posterior right upper lobe and right lower lobe. Trace right pleural effusion. The central airways clear. No abnormal mediastinal or hilar lymphadenopathy. No pericardial effusion. Changes of prior median sternotomy. CT abdomen: The liver, gallbladder, spleen, adrenal glands, and pancreas are unremarkable. Excreted intravenous contrast is seen within the renal collecting system, this degrades evaluation for small renal stones. No hydronephrosis. Mild bilateral perinephric stranding is likely physiologic given symmetry and lack of hydronephrosis. The appendix is normal. No abnormally dilated loops of bowel. There is moderate volume colonic stool with slightly prominent stool bolus in the rectum. Scattered colonic diverticula without evidence of diverticulitis. Abdominal aorta is nonaneurysmal with atherosclerotic calcifications. No evidence of free air or abdominal ascites. CT PELVIS: Urinary bladder is partially decompressed but grossly unremarkable. The prostate is not enlarged. No free pelvic fluid. No pelvic or inguinal lymphadenopathy. Scoliotic curvature with associated degenerative change centered about L2-L3. No acute osseous abnormalities. IMPRESSION: No evidence of pulmonary embolus. Slightly suboptimal evaluation of the distal segmental and subsegmental pulmonary arteries due to respiratory motion.  Trace right pleural effusion. Asymmetric elevation of the right hemidiaphragm with overlying atelectasis, difficult to exclude a subtle infiltrate in the inferior right upper and lower lobes. Moderate volume colonic stool. Diverticulosis without evidence of diverticulitis. Duplex Carotid Bilateral    Result Date: 2/2/2017  HISTORY: CVA/TIA, lack of coordination, vascular disease, syncope and abnormal gait. Bilateral carotid duplex sonography was performed. Right common and internal carotid artery peak systolic velocities were 85 and 105  cm/sec respectively with an internal to common carotid artery peak systolic velocity ratio of 1.2 . There is mild mixed atherosclerotic plaque within the right carotid system. The doppler arterial wave form tracings are normal. The right vertebral artery demonstrates antegrade flow. The left common and internal carotid artery peak systolic velocities were 154 and 89 cm/sec respectively with an internal to common carotid artery peak systolic velocity ratio of 0.9 . There is mild atherosclerotic plaque within the left carotid system. The Doppler wave form tracings are normal. Left vertebral artery demonstrates antegrade flow. IMPRESSION: Minimal atherosclerotic disease without any hemodynamically significant stenosis. (Class I disease). .     Results for orders placed or performed during the hospital encounter of 02/01/17   EKG, 12 LEAD, INITIAL   Result Value Ref Range    Systolic BP  mmHg    Diastolic BP  mmHg    Ventricular Rate 86 BPM    Atrial Rate 86 BPM    P-R Interval 166 ms    QRS Duration 162 ms    Q-T Interval 378 ms    QTC Calculation (Bezet) 452 ms    Calculated P Axis 57 degrees    Calculated R Axis -66 degrees    Calculated T Axis 44 degrees    Diagnosis       !! AGE AND GENDER SPECIFIC ECG ANALYSIS !! Normal sinus rhythm  Right bundle branch block  Left anterior fascicular block  !!! Bifascicular block !!!   Possible Lateral infarct , age undetermined  Abnormal ECG  When compared with ECG of 27-OCT-2016 07:04,  Left anterior fascicular block is now Present  Nonspecific T wave abnormality, worse in Inferior leads  Nonspecific T wave abnormality now evident in Anterior leads  QT has shortened  Confirmed by Luz Elena Pena MD (), ROSHAN CORDON ([id number]) on 2/1/2017 10:18:18 PM       XR Results (most recent):    Results from Hospital Encounter encounter on 02/01/17   XR CHEST PORT   Narrative AP PORTABLE CHEST X-RAY    HISTORY: CV surgery weeks ago. Posterior chest pain with slight fever    COMPARISON: 11/7/2016    FINDINGS: Median sternotomy wires are present. EKG leads are present. Lung  volumes are diminished. There is no lobar consolidation or large pleural  effusions. Impression IMPRESSION: Low lung volumes. Active problems  Active Hospital Problems    Diagnosis Date Noted    Acute encephalopathy 02/01/2017       ASSESSMENT  AND PLAN      · Encephalopathy- suspect cva vs sepsis- appears to be improving but family has not been around to assess- will follow up with them. I am leaning more towards infection given the fevers. If he is not improved on abx- will work up further  · Fevers- resolved at this time - concern for sepsis infiltrates not on ct chest which may represent a pna- occult malignancy remains in the differential  · Likely pna- subtle infiltrates on ct chest- will treat for pna and f/up cultures and clinical resolution  · Suspected syncope and collapse vs fall- PE workup negative- orthostatics ordered but not done.   · Cognitive deficits- concern for some early dementia- once infection cleared or other cause excluded- will re-assess- pt may not be able to live at home  · Edema- bl leg swelling - pitting 3 plus-  Check bnp - if not orthostatic can start gentle diuresis  · CAD- no active issues    DVT Prophylaxis:   Patient remains high risk for decompensation, given syncope of unclear etiology, encephalopathy , fevers with concern for sepsis    Signed By: Cecilia Sanz Maricel Driver MD     February 3, 2017

## 2017-02-03 NOTE — PROGRESS NOTES
Problem: Dysphagia (Adult)  Goal: *Acute Goals and Plan of Care (Insert Text)  STG: Patient will tolerate regular textures and thin liquids when dentures in place without overt signs or symptoms of aspiration 100% of the time. LTG: Patient will tolerate least restrictive diet consistency without respiratory compromise by discharge. Speech language pathology: bedside swallow note: Daily Note and Discharge    NAME/AGE/GENDER: Paxton Rodriguez is a 80 y.o. male  DATE: 2/3/2017  PRIMARY DIAGNOSIS: Acute encephalopathy       ICD-10: Treatment Diagnosis: Oral dysphagia (R13.11)  INTERDISCIPLINARY COLLABORATION: Registered Nurse  PRECAUTIONS/ALLERGIES: Review of patient's allergies indicates no known allergies. ASSESSMENT:Patient's swallow checked during noon meal of regular textures now that dentures are in place. Oral and pharyngeal phase of swallow functional. No further swallowing therapy indicated at this time. ?????? ? ? This section established at most recent assessment??????????  PROBLEM LIST (Impairments causing functional limitations):  1. Oral dysphagia  REHABILITATION POTENTIAL FOR STATED GOALS: Excellent  PLAN OF CARE:   Patient will benefit from skilled intervention to address the following impairments. RECOMMENDATIONS AND PLANNED INTERVENTIONS (Benefits and precautions of therapy have been discussed with the patient.):  · continue prescribed diet  MEDICATIONS:  · With liquid  COMPENSATORY STRATEGIES/MODIFICATIONS INCLUDING:  · Alternate liquids/solids  · Make sure oral cavity is completely clear at completion of meal  OTHER RECOMMENDATIONS (including follow up treatment recommendations):   · Patient education  RECOMMENDED DIET MODIFICATIONS DISCUSSED WITH:  · Family  · Patient  RECOMMENDED REHABILITATION/EQUIPMENT: (at time of discharge pending progress):   None. SUBJECTIVE:   Patient cooperative. Daughter present.   History of Present Injury/Illness: Mr. Rajwinder Sanchez  has a past medical history of Aortic valve stenosis, nonrheumatic (4/25/2016); Arthritis; Cor pulmonale ([facility] [address] Cyst near tailbone; DDD (degenerative disc disease), lumbar; Depression; Diverticulosis; Edema (04/25/2016); Former cigarette smoker; Genital edema, male (10/31/2016); Heart murmur; Hepatitis B; High cholesterol; History of colon polyps (5/13/2013); History of hepatitis A; History of staph infection; Long term (current) use of anticoagulants; Lumbago; SOB (shortness of breath); Spinal stenosis; TIA (transient ischemic attack) (5/13/2013); Transient ischemic attack (TIA) (2002); and Unspecified sleep apnea (05/21/2013). He also  has a past surgical history that includes cardiac surg procedure unlist; orthopaedic; hernia repair (Left); heart catheterization; colonoscopy; carotid endarterectomy (Left, 2002); and back surgery. Present Symptoms: altered mental status   Pain Intensity 1: 0  Pain Location 1: Head  Pain Orientation 1: Anterior  Pain Intervention(s) 1: Medication (see MAR)  Current Medications:   No current facility-administered medications on file prior to encounter. Current Outpatient Prescriptions on File Prior to Encounter   Medication Sig Dispense Refill    aspirin delayed-release 81 mg tablet Take 1 Tab by mouth daily. 90 Tab 3    metoprolol succinate (TOPROL-XL) 25 mg XL tablet Take 0.5 Tabs by mouth daily. 45 Tab 3    clopidogrel (PLAVIX) 75 mg tablet Take 1 Tab by mouth daily. Indications: Myocardial Reinfarction Prevention 30 Tab 2    potassium chloride (K-DUR, KLOR-CON) 20 mEq tablet Take 2 Tabs by mouth daily. 60 Tab 2    atorvastatin (LIPITOR) 80 mg tablet Take 1 Tab by mouth nightly. 30 Tab 2    furosemide (LASIX) 20 mg tablet Take 1 Tab by mouth two (2) times a day. 60 Tab 2    ferrous gluconate 324 mg (38 mg iron) tablet Take 1 Tab by mouth Daily (before breakfast). (Patient taking differently: Take 27 mg by mouth Daily (before breakfast). ) 30 Tab 0    oxyCODONE-acetaminophen (PERCOCET) 5-325 mg per tablet Take 1 Tab by mouth every four (4) hours as needed. Max Daily Amount: 6 Tabs. 30 Tab 0    cyanocobalamin (VITAMIN B-12) 1,000 mcg tablet Take 1,000 mcg by mouth every morning.  DULoxetine (CYMBALTA) 60 mg capsule Take 60 mg by mouth every morning.  esomeprazole (NEXIUM) 40 mg capsule Take 40 mg by mouth every morning.  gabapentin (NEURONTIN) 300 mg capsule Take 300 mg by mouth two (2) times a day.  levothyroxine (SYNTHROID) 150 mcg tablet Take 150 mcg by mouth Daily (before breakfast).  tamsulosin (FLOMAX) 0.4 mg capsule Take 0.4 mg by mouth nightly.  finasteride (PROSCAR) 5 mg tablet Take 5 mg by mouth nightly.  diazepam (VALIUM) 5 mg tablet Take 5 mg by mouth every six (6) hours as needed for Anxiety.  cyclobenzaprine (FLEXERIL) 10 mg tablet Take  by mouth three (3) times daily as needed for Muscle Spasm(s).  traZODone (DESYREL) 150 mg tablet Take 150 mg by mouth nightly.  multivitamins-minerals-lutein (CENTRUM SILVER) Tab Take 1 Tab by mouth every morning.  Cholecalciferol, Vitamin D3, (VITAMIN D3) 1,000 unit cap Take 1,000 Units by mouth every morning. Current Dietary Status:  Regular textures, thin liquids    Social History/Home Situation:    Home Environment: Private residence  # Steps to Enter: 2  Rails to Enter: Yes  Hand Rails : Bilateral  Wheelchair Ramp: No  One/Two Story Residence: Two story, live on 1st floor  # of Interior Steps: 12  Lift Chair Available: Yes  Living Alone: Yes  Support Systems: Family member(s)  Patient Expects to be Discharged to[de-identified] Private residence  Current DME Used/Available at Home: Walker, rolling  Tub or Shower Type: Tub/Shower combination  OBJECTIVE:   Respiratory Status:        CXR Results:IMPRESSION: Low lung volumes. MRI/CT Results:IMPRESSION:   1. Cerebral volume loss. 2. No acute intracranial hemorrhage.     BEDSIDE SWALLOW EVALUATION  Functional swallow of noon meal.    OTHER OBSERVATIONS:  Rate/bite size: WNL   Endurance: WNL     Tool Used: Dysphagia Outcome and Severity Scale (MASSIEL)    Score Comments   Normal Diet  [x] 7 With no strategies or extra time needed   Functional Swallow  [] 6 May have mild oral or pharyngeal delay       Mild Dysphagia    [] 5 Which may require one diet consistency restricted (those who demonstrate penetration which is entirely cleared on MBS would be included)   Mild-Moderate Dysphagia  [] 4 With 1-2 diet consistencies restricted       Moderate Dysphagia  [] 3 With 2 or more diet consistencies restricted       Moderately Severe Dysphagia  [] 2 With partial PO strategies (trials with ST only)       Severe Dysphagia  [] 1 With inability to tolerate any PO safely          Score:  Initial: 6 Most Recent: 7 (Date: -- )   Interpretation of Tool: The Dysphagia Outcome and Severity Scale (MASSIEL) is a simple, easy-to-use, 7-point scale developed to systematically rate the functional severity of dysphagia based on objective assessment and make recommendations for diet level, independence level, and type of nutrition. Score 7 6 5 4 3 2 1   Modifier CH CI CJ CK CL CM CN   ?  Swallowing:     - CURRENT STATUS: CH - 0% impaired, limited or restricted    - GOAL STATUS:  CH - 0% impaired, limited or restricted    - D/C STATUS:  [address] [place] [address] - 0% impaired, limited or restricted  [place] / [place] / Product Type: Medicare /     TREATMENT:    (In addition to Assessment/Re-Assessment sessions the following treatments were rendered)  Assessment/Reassessment only, no treatment provided today  _____________________________________________________________________________________________  Safety:   After treatment position/precautions:  · Up in bed  · Family at bedside  Treatment Assessment:  No further skilled swallowing therapy indicated    Total Treatment Duration:  Time In: 1215  Time Out: Elian 49, MA, CCC-SLP

## 2017-02-03 NOTE — PROGRESS NOTES
Patient complained of restlessness at 2100, given Trazadone 50mg po. It was effective he rested well. He had contrast dye at 0305 for ct of chest/abdomen/ and pelvis. He is being transported by staff to CT.

## 2017-02-03 NOTE — PROGRESS NOTES
Problem: Mobility Impaired (Adult and Pediatric)  Goal: *Acute Goals and Plan of Care (Insert Text)  LTG:  (1.)Mr. Stacey Gonzales will move from supine to sit and sit to supine , scoot up and down and roll side to side in bed with MODIFIED INDEPENDENCE within 7 day(s). (2.)Mr. Stacey Gonzales will transfer from bed to chair and chair to bed with MODIFIED INDEPENDENCE using the least restrictive device within 7 day(s). (3.)Mr. Stacey Gonzales will ambulate with MODIFIED INDEPENDENCE for >tf=717 feet with the least restrictive device, appropriate gait pattern and good dynamic standing balance within 7 day(s). (4.)Mr. Stacey Gonzales will ascend/descend 2 steps with 1-2 handrail(s) and CGA within 7 day(s) for safe entry/exit of home. (5.)Mr. Stacey Gonzales will perform B LE therapeutic exercises x 20 reps with SUPERVISION within 7 days to increase strength for improved safety and independence in transfers and gait.  _______________________________________________________________________________________________  PHYSICAL THERAPY: Daily Note, Treatment Day: 1st and AM 2/3/2017  INPATIENT: Hospital Day: 3  [place] / Product Type: Medicare /      NAME/AGE/GENDER: Dustin Xie is a 80 y.o. male      PRIMARY DIAGNOSIS: Acute encephalopathy Acute encephalopathy Acute encephalopathy        ICD-10: Treatment Diagnosis:       · Generalized Muscle Weakness (M62.81)  · Difficulty in walking, Not elsewhere classified (R26.2)  · Repeated Falls (R29.6)  · History of falling (Z91.81)   Precaution/Allergies:  Review of patient's allergies indicates no known allergies. ASSESSMENT:      Mr. Stacey Gonzales was supine upon contact and agreeable to PT. Patient was able to perform supine to sit with SBA, additional time, and cues for proper technique. Patient transfers to standing with min assist and verbal cues for proper technique/hand placement.  Once standing patient able to ambulate 25' with use of rolling walker, min assist, and cues for sequencing with rolling walker/posture. Patient because impulsive and demonstrated decreased safety awareness when needing to use the bathroom after ambulation. Provided cues to improve patient's safety but he was not receptive to this teaching/cueing. Patient was left sitting on toilet with PCT in room. Overall slow, steady progress towards goals. Will continue efforts. This section established at most recent assessment   PROBLEM LIST (Impairments causing functional limitations):  1. Decreased Strength  2. Decreased ADL/Functional Activities  3. Decreased Transfer Abilities  4. Decreased Ambulation Ability/Technique  5. Decreased Balance  6. Increased Pain  7. Decreased Activity Tolerance  8. Increased Shortness of Breath  9. Decreased [place]hing with Home Exercise Program    INTERVENTIONS PLANNED: (Benefits and precautions of physical therapy have been discussed with the patient.)  1. Balance Exercise  2. Bed Mobility  3. Family Education  4. Home Exercise Program (HEP)  5. Therapeutic Activites  6. Therapeutic Exercise/Strengthening  7. Transfer Training  8. Group Therapy      TREATMENT PLAN: Frequency/Duration: 3 times a week for duration of hospital stay  Rehabilitation Potential For Stated Goals: GOOD      RECOMMENDED REHABILITATION/EQUIPMENT: (at time of discharge pending progress): Continue Skilled Therapy. HISTORY:   History of Present Injury/Illness (Reason for Referral):  Per chart: Julio Cesar Swift is a 80 y.o.  male who presents with weakness and frequent falls. This AM he was found down on floor and called EMS for help. He then was checked on by family later in the day and he was on the floor again. He cannot remember why he fell. He admits to neuropathy. His son says he is more confused although he is A&Ox3 and will answer some questions, however, he keeps repeating \"alrighty I don't know\" over and over again when asked what happened to him.  No focal neuro deficits per patient. UA is pending. WBC normal, lactic acid normal, CT head negative. Is on plavix and baby aspirin following CABG and AV replacement a few months ago. Past Medical History/Comorbidities:   Mr. Fox Zayas  has a past medical history of Aortic valve stenosis, nonrheumatic (4/25/2016); Arthritis; Cor pulmonale ([address] Cyst near tailbone; DDD (degenerative disc disease), lumbar; Depression; Diverticulosis; Edema (04/25/2016); Former cigarette smoker; Genital edema, male (10/31/2016); Heart murmur; Hepatitis B; High cholesterol; History of colon polyps (5/13/2013); History of hepatitis A; History of staph infection; Long term (current) use of anticoagulants; Lumbago; SOB (shortness of breath); Spinal stenosis; TIA (transient ischemic attack) (5/13/2013); Transient ischemic attack (TIA) (2002); and Unspecified sleep apnea (05/21/2013). Mr. Fox Zayas  has a past surgical history that includes cardiac surg procedure unlist; orthopaedic; hernia repair (Left); heart catheterization; colonoscopy; carotid endarterectomy (Left, 2002); and back surgery. Social History/Living Environment:   Home Environment: Private residence  # Steps to Enter: 2  Rails to Enter: Yes  Hand Rails : Bilateral  Wheelchair Ramp: No  One/Two Story Residence: Two story, live on 1st floor  # of Interior Steps: 12  Lift Chair Available: Yes  Living Alone: Yes  Support Systems: Family member(s)  Patient Expects to be Discharged to[de-identified] Private residence  Current DME Used/Available at Home: Walker, rolling  Tub or Shower Type: Tub/Shower combination  Prior Level of Function/Work/Activity:  Pt lives alone. Daughter-in-law comes to house 3 times/week to check on pt and take him for appointments/groceries. Has Life Alert call system in place. Ambulates with a straight cane or rolling walker. Independent with all ADLs with shower chair. 2 recent falls. Home is 2 levels but all needs are on main level.       Number of Personal Factors/Comorbidities that affect the Plan of Care: 1-2: MODERATE COMPLEXITY   EXAMINATION:   Most Recent Physical Functioning:   Gross Assessment:                  Posture:     Balance:  Sitting: Impaired  Sitting - Static: Good (unsupported)  Sitting - Dynamic: Fair (occasional)  Standing: Impaired  Standing - Static: Fair  Standing - Dynamic : Poor Bed Mobility:  Supine to Sit: Stand-by asssistance (HOB elevated )  Wheelchair Mobility:     Transfers:  Sit to Stand: Minimum assistance  Stand to Sit: Minimum assistance  Bed to Chair: Minimum assistance  Gait:     Base of Support: Widened  Speed/[place]: Slow;Shuffled  Step Length: Left shortened;Right shortened  Gait Abnormalities: Decreased step clearance;Trunk sway increased; Path deviations; Shuffling gait  Distance (ft): 25 Feet (ft)  Assistive Device: Walker, rolling  Ambulation - Level of Assistance: Minimal assistance  Interventions: Safety awareness training; Tactile cues; Verbal cues;Manual cues       Body Structures Involved:  1. Joints  2. Muscles Body Functions Affected:  1. Neuromusculoskeletal  2. Movement Related Activities and Participation Affected:  1. General Tasks and Demands  2. Mobility  3. Self Care   Number of elements that affect the Plan of Care: 4+: HIGH COMPLEXITY   CLINICAL PRESENTATION:   Presentation: Evolving clinical presentation with changing clinical characteristics: MODERATE COMPLEXITY   CLINICAL DECISION MAKIN [facility] Inpatient Short Form  How much difficulty does the patient currently have. .. Unable A Lot A Little None   1. Turning over in bed (including adjusting bedclothes, sheets and blankets)? [ ] 1   [ ] 2   [X] 3   [ ] 4   2. Sitting down on and standing up from a chair with arms ( e.g., wheelchair, bedside commode, etc.)   [ ] 1   [ ] 2   [X] 3   [ ] 4   3. Moving from lying on back to sitting on the side of the bed?    [ ] 1   [ ] 2   [X] 3   [ ] 4   How much help from another person does the patient currently need. .. Total A Lot A Little None   4. Moving to and from a bed to a chair (including a wheelchair)? [ ] 1   [ ] 2   [X] 3   [ ] 4   5. Need to walk in hospital room? [ ] 1   [X] 2   [ ] 3   [ ] 4   6. Climbing 3-5 steps with a railing? [ ] 1   [X] 2   [ ] 3   [ ] 4   © 2007, Trustees of [address] [place] [address] [address] [address], under license to [employer]. All rights reserved    Score:  Initial: 16 Most Recent: X (Date: -- )     Interpretation of Tool:  Represents activities that are increasingly more difficult (i.e. Bed mobility, Transfers, Gait). Score 24 23 22-20 19-15 14-10 9-7 6       Modifier CH CI CJ CK CL CM CN         · Mobility - Walking and Moving Around:               - CURRENT STATUS:    CK - 40%-59% impaired, limited or restricted               - GOAL STATUS:           CK - 40%-59% impaired, limited or restricted               - D/C STATUS:                       ---------------To be determined---------------  [place] / Product Type: Medicare /       Medical Necessity:     · Patient demonstrates good rehab potential due to higher previous functional level. Reason for Services/Other Comments:  · Patient continues to require present interventions due to patient's inability to function at baseline. Use of outcome tool(s) and clinical judgement create a POC that gives a: Questionable prediction of patient's progress: MODERATE COMPLEXITY                 TREATMENT:   (In addition to Assessment/Re-Assessment sessions the following treatments were rendered)   Pre-treatment Symptoms/Complaints:  \" They can take this oxygen off me right now. I don't need it. I can go home. \"  Pain: Initial:   Pain Intensity 1: 0 (before and after treatment)  Post Session:  4/10      Therapeutic Activity: (    25 Minutes):   Therapeutic activities including bed mobility training, transfer training, static/dynamic standing balance activities, posture training, instruction in sequencing with rolling walker, ambulation on level ground, safety awareness training, scooting, and patient education to improve mobility, strength, balance and activity tolerance. Required moderate Safety awareness training; Tactile cues; Verbal cues;Manual cues to promote static and dynamic balance in standing, promote coordination of bilateral, lower extremity(s) and to promote improved technique with bed mobility, transfers, and safety awareness/impusiveness. Braces/Orthotics/Lines/Etc:   · None  Treatment/Session Assessment:    · Response to Treatment:  See above  · Interdisciplinary Collaboration:  · Physical Therapy Assistant and Registered Nurse  · After treatment position/precautions:  · Bed/Chair-wheels locked, Call light within reach, RN notified, Family at bedside and seated on toilet with PCT at bedside  · Compliance with Program/Exercises: Will assess as treatment progresses. · Recommendations/Intent for next treatment session: \"Next visit will focus on advancements to more challenging activities and reduction in assistance provided\".   Total Treatment Duration:  PT Patient Time In/Time Out  Time In: 0996  Time Out: 302 [facility]

## 2017-02-04 NOTE — PROGRESS NOTES
Hospitalist Progress Note    2017  Admit Date: 2017  7:57 PM   NAME: Jamie Lopez   :  1935   MRN:  [id number]   Attending: Torey Bucio DO  PCP:  Emerald Ross MD      Admitted for: acute metabolic encephalopathy- fevers of unclear etiology with concern for sepsis    SUBJECTIVE:   As previously documented: \" Jamie Lopez is a 80 y.o.  male who presents with weakness and frequent falls. This AM he was found down on floor and called EMS for help. He then was checked on by family later in the day and he was on the floor again. He cannot remember why he fell. He admits to neuropathy. His son says he is more confused although he is A&Ox3 and will answer some questions, however, he keeps repeating \"alrighty I don't know\" over and over again when asked what happened to him. No focal neuro deficits per patient. UA is pending. WBC normal, lactic acid normal, CT head negative. Is on plavix and baby aspirin following CABG and AV replacement a few months ago ( 2016). \"        Hospital Course-  Pt admitted  fall and ams. Maggy Lars twice but unknown how. Was confused- no slurred speech as per family- had a fever of 100.4 when ems came. Transferred here and admitted for concern for cva vs sepsis. Had fever of 101.3 this am and low grade fever again this am with confusion- so abx started. Unable to obtain an mri  As per the pt secondary to the heart valve replacement. Discussed with cards and vascular - pt can have the MRI- but he is adamant about it. Given that he is a poor historian and family does not know why he cant have a mri- will hold off. Pt himself cant recall what happened. Oriented to time, place & person by some higher cognitive deficits on his MoCA as per OT. He wears glasses, a hearing aid and dentures at baseline. 2017- Pt seen. Having diarrhea since yesterday afternoon- refusing to work with PT. Says he can do PT at home.  When asked to clarify he says he cant work with PT because of the diarrhea and he does not believe that he was ever confused no matter what his kids say. Refused MRI      Review of Systems negative with exception of pertinent positives noted above  Past medical history unchanged from H&P    PHYSICAL EXAM     Visit Vitals    /68 (BP 1 Location: Right arm, BP Patient Position: At rest)    Pulse 63    Temp 97.4 °F (36.3 °C)    Resp 16    Ht 5' 10\" (1.778 m)    Wt 101.6 kg (224 lb)    SpO2 95%    BMI 32.14 kg/m2      Temp (24hrs), Av.3 °F (36.8 °C), Min:97.4 °F (36.3 °C), Max:98.9 °F (37.2 °C)    Oxygen Therapy  O2 Sat (%): 95 % (17 1215)  Pulse via Oximetry: 95 beats per minute (17 0007)  O2 Device: Nasal cannula (17 152)  O2 Flow Rate (L/min): 3 l/min (17 1521)    Intake/Output Summary (Last 24 hours) at 17 1533  Last data filed at 17 0911   Gross per 24 hour   Intake                0 ml   Output              500 ml   Net             -500 ml        General: No acute distress  mucous membranes pink and moist acyanotic anicteric  Neck:  Supple full range of motion  Lungs:  Air entry equal bilaterally, no crepitations rales rhonchi   Heart:  Regular rate and rhythm,  No murmur, rub, or gallop  Abdomen: Soft, Non distended, Non tender, no rebound guarding Positive bowel sounds  Extremities: No cyanosis, clubbing or edema  Neurologic:  No focal deficits  Musculoskeletal: no   Joint swelling tenderness erythema  Skin:  No erythema, rashes noted          LAB  No results for input(s): GLUCPOC in the last 72 hours.     No lab exists for component: [place]   Recent Labs      17   0655   WBC  5.9   HGB  12.8*   HCT  41.1   PLT  126*     Recent Labs      17   0215  17   NA  141   --   138   K  3.8   --   4.0   CL  102   --   101   CO2  30   --   31   GLU  98   --   143*   BUN  13   --   16   CREA  0.79*   --   0.99   MG   --   2.2   --    CA  7.8*   --   8.4   TROIQ --   0.03   --    ALB   --    --   3.5   TBILI   --    --   0.6   ALT   --    --   34   SGOT   --    --   27   BNPP   --   85   --        EKG and imaging reviewed personally by me  No results found. Results for orders placed or performed during the hospital encounter of 02/01/17   EKG, 12 LEAD, INITIAL   Result Value Ref Range    Systolic BP  mmHg    Diastolic BP  mmHg    Ventricular Rate 86 BPM    Atrial Rate 86 BPM    P-R Interval 166 ms    QRS Duration 162 ms    Q-T Interval 378 ms    QTC Calculation (Bezet) 452 ms    Calculated P Axis 57 degrees    Calculated R Axis -66 degrees    Calculated T Axis 44 degrees    Diagnosis       !! AGE AND GENDER SPECIFIC ECG ANALYSIS !! Normal sinus rhythm  Right bundle branch block  Left anterior fascicular block  !!! Bifascicular block !!! Possible Lateral infarct , age undetermined  Abnormal ECG  When compared with ECG of 27-OCT-2016 07:04,  Left anterior fascicular block is now Present  Nonspecific T wave abnormality, worse in Inferior leads  Nonspecific T wave abnormality now evident in Anterior leads  QT has shortened  Confirmed by Raji Lee MD (), ROSHAN CORDON ([id number]) on 2/1/2017 10:18:18 PM       XR Results (most recent):    Results from Hospital Encounter encounter on 02/01/17   XR CHEST PORT   Narrative AP PORTABLE CHEST X-RAY    HISTORY: CV surgery weeks ago. Posterior chest pain with slight fever    COMPARISON: 11/7/2016    FINDINGS: Median sternotomy wires are present. EKG leads are present. Lung  volumes are diminished. There is no lobar consolidation or large pleural  effusions. Impression IMPRESSION: Low lung volumes.         Active problems  Active Hospital Problems    Diagnosis Date Noted    Acute encephalopathy 02/01/2017       ASSESSMENT  AND PLAN      · Encephalopathy- suspect cva vs sepsis- leaning towards the latter with some baseline dementia  · Fevers- resolved at this time - concern for sepsis infiltrates noted on ct chest which may represent a pna-  · Likely pna- subtle infiltrates on ct chest- will treat for pna and f/up cultures and clinical resolution  · Suspected syncope and collapse vs fall- PE workup negative- orthostatics ordered but not done.   · Cognitive deficits- concern for some early dementia- once infection cleared will re-assess- pt may not be able to live at home  · Edema- bl leg swelling appears to be improving  · Diarrhea- send cdiff  · CAD- no active issues    DVT Prophylaxis:   Patient remains high risk for decompensation, given syncope of unclear etiology,  fevers with concern for sepsis of     Signed By: Adrian Leal MD     February 4, 2017

## 2017-02-04 NOTE — PROGRESS NOTES
Problem: Mobility Impaired (Adult and Pediatric)  Goal: *Acute Goals and Plan of Care (Insert Text)  LTG:  (1.)Mr. Rola Evangelista will move from supine to sit and sit to supine , scoot up and down and roll side to side in bed with MODIFIED INDEPENDENCE within 7 day(s). (2.)Mr. Rola Evangelista will transfer from bed to chair and chair to bed with MODIFIED INDEPENDENCE using the least restrictive device within 7 day(s). (3.)Mr. Rola Evangelista will ambulate with MODIFIED INDEPENDENCE for >jf=853 feet with the least restrictive device, appropriate gait pattern and good dynamic standing balance within 7 day(s). (4.)Mr. Rola Evangelista will ascend/descend 2 steps with 1-2 handrail(s) and CGA within 7 day(s) for safe entry/exit of home. (5.)Mr. Rola Evangelista will perform B LE therapeutic exercises x 20 reps with SUPERVISION within 7 days to increase strength for improved safety and independence in transfers and gait.  _______________________________________________________________________________________________  PHYSICAL THERAPY: Daily Note, Treatment Day: 2nd and PM 2/4/2017  INPATIENT: Hospital Day: 4  [place] / Product Type: Medicare /      NAME/AGE/GENDER: Elsie Camarillo is a 80 y.o. male      PRIMARY DIAGNOSIS: Acute encephalopathy Acute encephalopathy Acute encephalopathy        ICD-10: Treatment Diagnosis:       · Generalized Muscle Weakness (M62.81)  · Difficulty in walking, Not elsewhere classified (R26.2)  · Repeated Falls (R29.6)  · History of falling (Z91.81)   Precaution/Allergies:  Review of patient's allergies indicates no known allergies. ASSESSMENT:      Mr. Rola Evangelista was supine upon contact and much more agreeable to participate in PT this afternoon compared to this morning. Patient is like a different man. Patient able to perform bed mobility with SBA and transfers with CGA.  Once standing patient able to increase gait distance to [de-identified]' with use of rolling walker, CGA and occasional cues for sequencing with rolling walker/posture. Patient again became slightly impulsive demonstrating decreased safety awareness when he needed to use the bathroom. Patient requires increased cues and assistance to navigate into the bathroom to have BM. Demonstrates fair standing balance during ADL activity. Overall slow, steady progress towards goals. Will continue efforts. This section established at most recent assessment   PROBLEM LIST (Impairments causing functional limitations):  1. Decreased Strength  2. Decreased ADL/Functional Activities  3. Decreased Transfer Abilities  4. Decreased Ambulation Ability/Technique  5. Decreased Balance  6. Increased Pain  7. Decreased Activity Tolerance  8. Increased Shortness of Breath  9. Decreased Desha with Home Exercise Program    INTERVENTIONS PLANNED: (Benefits and precautions of physical therapy have been discussed with the patient.)  1. Balance Exercise  2. Bed Mobility  3. Family Education  4. Home Exercise Program (HEP)  5. Therapeutic Activites  6. Therapeutic Exercise/Strengthening  7. Transfer Training  8. Group Therapy      TREATMENT PLAN: Frequency/Duration: 3 times a week for duration of hospital stay  Rehabilitation Potential For Stated Goals: GOOD      RECOMMENDED REHABILITATION/EQUIPMENT: (at time of discharge pending progress): Continue Skilled Therapy. HISTORY:   History of Present Injury/Illness (Reason for Referral):  Per chart: Paxton Rodriguez is a 80 y.o.  male who presents with weakness and frequent falls. This AM he was found down on floor and called EMS for help. He then was checked on by family later in the day and he was on the floor again. He cannot remember why he fell. He admits to neuropathy. His son says he is more confused although he is A&Ox3 and will answer some questions, however, he keeps repeating \"alrighty I don't know\" over and over again when asked what happened to him.  No focal neuro deficits per patient. UA is pending. WBC normal, lactic acid normal, CT head negative. Is on plavix and baby aspirin following CABG and AV replacement a few months ago. Past Medical History/Comorbidities:   Mr. Nilda García  has a past medical history of Aortic valve stenosis, nonrheumatic (4/25/2016); Arthritis; Cor pulmonale ([address] Cyst near tailbone; DDD (degenerative disc disease), lumbar; Depression; Diverticulosis; Edema (04/25/2016); Former cigarette smoker; Genital edema, male (10/31/2016); Heart murmur; Hepatitis B; High cholesterol; History of colon polyps (5/13/2013); History of hepatitis A; History of staph infection; Long term (current) use of anticoagulants; Lumbago; SOB (shortness of breath); Spinal stenosis; TIA (transient ischemic attack) (5/13/2013); Transient ischemic attack (TIA) (2002); and Unspecified sleep apnea (05/21/2013). Mr. Nilda García  has a past surgical history that includes cardiac surg procedure unlist; orthopaedic; hernia repair (Left); heart catheterization; colonoscopy; carotid endarterectomy (Left, 2002); and back surgery. Social History/Living Environment:   Home Environment: Private residence  # Steps to Enter: 2  Rails to Enter: Yes  Hand Rails : Bilateral  Wheelchair Ramp: No  One/Two Story Residence: Two story, live on 1st floor  # of Interior Steps: 12  Lift Chair Available: Yes  Living Alone: Yes  Support Systems: Family member(s)  Patient Expects to be Discharged to[de-identified] Private residence  Current DME Used/Available at Home: Walker, rolling  Tub or Shower Type: Tub/Shower combination  Prior Level of Function/Work/Activity:  Pt lives alone. Daughter-in-law comes to house 3 times/week to check on pt and take him for appointments/groceries. Has Life Alert call system in place. Ambulates with a straight cane or rolling walker. Independent with all ADLs with shower chair. 2 recent falls. Home is 2 levels but all needs are on main level.       Number of Personal Factors/Comorbidities that affect the Plan of Care: 1-2: MODERATE COMPLEXITY   EXAMINATION:   Most Recent Physical Functioning:   Gross Assessment:                  Posture:     Balance:  Sitting: Impaired  Sitting - Static: Good (unsupported)  Sitting - Dynamic: Fair (occasional)  Standing: Impaired; With support  Standing - Static: Fair  Standing - Dynamic : Fair Bed Mobility:  Supine to Sit: Stand-by asssistance  Sit to Supine: Stand-by asssistance  Wheelchair Mobility:     Transfers:  Sit to Stand: Contact guard assistance  Stand to Sit: Contact guard assistance  Gait:     Base of Support: Widened  Speed/[place]: Slow;Shuffled  Step Length: Left shortened;Right shortened  Gait Abnormalities: Decreased step clearance;Trunk sway increased; Path deviations  Distance (ft): 80 Feet (ft)  Assistive Device: Walker, rolling  Ambulation - Level of Assistance: Contact guard assistance  Interventions: Safety awareness training; Tactile cues; Verbal cues       Body Structures Involved:  1. Joints  2. Muscles Body Functions Affected:  1. Neuromusculoskeletal  2. Movement Related Activities and Participation Affected:  1. General Tasks and Demands  2. Mobility  3. Self Care   Number of elements that affect the Plan of Care: 4+: HIGH COMPLEXITY   CLINICAL PRESENTATION:   Presentation: Evolving clinical presentation with changing clinical characteristics: MODERATE COMPLEXITY   CLINICAL DECISION MAKIN [facility] Inpatient Short Form  How much difficulty does the patient currently have. .. Unable A Lot A Little None   1. Turning over in bed (including adjusting bedclothes, sheets and blankets)? [ ] 1   [ ] 2   [X] 3   [ ] 4   2. Sitting down on and standing up from a chair with arms ( e.g., wheelchair, bedside commode, etc.)   [ ] 1   [ ] 2   [X] 3   [ ] 4   3. Moving from lying on back to sitting on the side of the bed?    [ ] 1   [ ] 2   [X] 3   [ ] 4   How much help from another person does the patient currently need... Total A Lot A Little None   4. Moving to and from a bed to a chair (including a wheelchair)? [ ] 1   [ ] 2   [X] 3   [ ] 4   5. Need to walk in hospital room? [ ] 1   [X] 2   [ ] 3   [ ] 4   6. Climbing 3-5 steps with a railing? [ ] 1   [X] 2   [ ] 3   [ ] 4   © 2007, Trustees of [address] [place] [address] [address] [id number], under license to [employer]. All rights reserved    Score:  Initial: 16 Most Recent: X (Date: -- )     Interpretation of Tool:  Represents activities that are increasingly more difficult (i.e. Bed mobility, Transfers, Gait). Score 24 23 22-20 19-15 14-10 9-7 6       Modifier CH CI CJ CK CL CM CN         · Mobility - Walking and Moving Around:               - CURRENT STATUS:    CK - 40%-59% impaired, limited or restricted               - GOAL STATUS:           CK - 40%-59% impaired, limited or restricted               - D/C STATUS:                       ---------------To be determined---------------  [place] / [place] / Product Type: Medicare /       Medical Necessity:     · Patient demonstrates good rehab potential due to higher previous functional level. Reason for Services/Other Comments:  · Patient continues to require present interventions due to patient's inability to function at baseline. Use of outcome tool(s) and clinical judgement create a POC that gives a: Questionable prediction of patient's progress: MODERATE COMPLEXITY                 TREATMENT:   (In addition to Assessment/Re-Assessment sessions the following treatments were rendered)   Pre-treatment Symptoms/Complaints:  \"I just want to go home that is all\"  Pain: Initial:   Pain Intensity 1: 0 (before and after treatment)  Post Session:  4/10      Therapeutic Activity: (    25 Minutes):   Therapeutic activities including bed mobility training, transfer training from various surface heights, static/dynamic standing balance activities, posture training, instruction in sequencing with rolling walker, ambulation on level ground, safety awareness training, scooting, and patient education to improve mobility, strength, balance and activity tolerance. Required moderate Safety awareness training; Tactile cues; Verbal cues to promote static and dynamic balance in standing, promote coordination of bilateral, lower extremity(s) and to promote improved technique with bed mobility, transfers, and safety awareness/impusiveness. Braces/Orthotics/Lines/Etc:   · None  Treatment/Session Assessment:    · Response to Treatment:  See above  · Interdisciplinary Collaboration:  · Physical Therapy Assistant and Registered Nurse  · After treatment position/precautions:  · Supine in bed, Bed/Chair-wheels locked, Bed in low position, Call light within reach and RN notified  · Compliance with Program/Exercises: Will assess as treatment progresses. · Recommendations/Intent for next treatment session: \"Next visit will focus on advancements to more challenging activities and reduction in assistance provided\".   Total Treatment Duration:  PT Patient Time In/Time Out  Time In: 1344  [facility]

## 2017-02-04 NOTE — PROGRESS NOTES
Pt refuses to work with PT. Explained to him the reason we need him to work with PT.  He stated he didn't care he just wants to go home

## 2017-02-04 NOTE — PROGRESS NOTES
Patient complains of achy arthritic type pain rated 5/10. Given Tylenol 650 mg po. Patient also complains of insomnia and requested Trazadone 50mg po.

## 2017-02-04 NOTE — PROGRESS NOTES
PT Daily Note  Attempted to see patient for physical therapy this morning but patient adamantly refuses to participate despite encouragement from this PT and RN. Patient stated, \"I'm not going to walk. I just want to go home and these doctors are just trying to fish for something that's wrong with me\". Will check back on patient later this weekend or Monday if schedule permits.   Thank you,  Celia Bo, PTA

## 2017-02-04 NOTE — PROGRESS NOTES
Patient has remained alert and oriented x4 this evening. No confusion noted, but he has uttered repeated phrase \"all righty, I don't know\" on and off throughout the night. When asked questions he answers appropriately and displays no confusion at this time. He has been voiding well in the urinal. He rested well after receiving 50mg of Trazadone earlier this evening.

## 2017-02-05 PROBLEM — A41.9 SEPSIS (HCC): Status: ACTIVE | Noted: 2017-01-01

## 2017-02-05 PROBLEM — R55 SYNCOPE AND COLLAPSE: Status: ACTIVE | Noted: 2017-01-01

## 2017-02-05 PROBLEM — J18.9 PNEUMONIA: Status: ACTIVE | Noted: 2017-01-01

## 2017-02-05 PROBLEM — R19.7 DIARRHEA: Status: ACTIVE | Noted: 2017-01-01

## 2017-02-05 NOTE — PROGRESS NOTES
Hospitalist Progress Note    2017  Admit Date: 2017  7:57 PM   NAME: Dulce Ingram   :  1935   MRN:  [id number]   Attending: Liu Chavez DO  PCP:  Elmer Singh MD      Admitted for: acute metabolic encephalopathy- fevers of unclear etiology with concern for sepsis    SUBJECTIVE:   As previously documented: \" Dulce Ingram is a 80 y.o.  male who presents with weakness and frequent falls. This AM he was found down on floor and called EMS for help. He then was checked on by family later in the day and he was on the floor again. He cannot remember why he fell. He admits to neuropathy. His son says he is more confused although he is A&Ox3 and will answer some questions, however, he keeps repeating \"alrighty I don't know\" over and over again when asked what happened to him. No focal neuro deficits per patient. UA is pending. WBC normal, lactic acid normal, CT head negative. Is on plavix and baby aspirin following CABG and AV replacement a few months ago ( 2016). \"        Hospital Course-  Pt admitted  fall and ams. Frantz Gelineau twice but unknown how. Was confused- no slurred speech as per family- had a fever of 100.4 when ems came. Transferred here and admitted for concern for cva vs sepsis. Had fever of 101.3 this am and low grade fever again this am with confusion- so abx started. Unable to obtain an mri  As per the pt secondary to the heart valve replacement. Discussed with cards and vascular - pt can have the MRI- but he is adamant about it. Given that he is a poor historian and family does not know why he cant have a mri- will hold off. Pt himself cant recall what happened. Oriented to time, place & person by some higher cognitive deficits on his MoCA as per OT. He wears glasses, a hearing aid and dentures at baseline. 2017- Pt seen. Diarrhea has resolved. He is c diff negative. He wants to go home and does not want rehab.  Unfortunately he refused to work with pt again this morning. He did work with them yesterday when explained that they needed to evaluate him prior to discharge and they were able to come back-  But no final recommendations from them yet. I did his MMSE and he got     Review of Systems negative with exception of pertinent positives noted above  Past medical history unchanged from H&P    PHYSICAL EXAM     Visit Vitals    /62 (BP 1 Location: Left arm)    Pulse (!) 57    Temp 97.6 °F (36.4 °C)    Resp 17    Ht 5' 10\" (1.778 m)    Wt 101.6 kg (224 lb)    SpO2 94%    BMI 32.14 kg/m2      Temp (24hrs), Av.1 °F (36.7 °C), Min:97.3 °F (36.3 °C), Max:98.5 °F (36.9 °C)    Oxygen Therapy  O2 Sat (%): 94 % (17 1221)  Pulse via Oximetry: 95 beats per minute (17 0007)  O2 Device: Nasal cannula (17)  O2 Flow Rate (L/min): 3 l/min (17 152)    Intake/Output Summary (Last 24 hours) at 17 1429  Last data filed at 17 0709   Gross per 24 hour   Intake                0 ml   Output              550 ml   Net             -550 ml        General: No acute distress  mucous membranes pink and moist acyanotic anicteric  Neck:  Supple full range of motion  Lungs:  Air entry equal bilaterally, no crepitations rales rhonchi   Heart:  Regular rate and rhythm,  No murmur, rub, or gallop  Abdomen: Soft, Non distended, Non tender, no rebound guarding Positive bowel sounds  Extremities: No cyanosis, clubbing or edema  Neurologic:  No focal deficits  Musculoskeletal: no   Joint swelling tenderness erythema  Skin:  No erythema, rashes noted          LAB  No results for input(s): GLUCPOC in the last 72 hours. No lab exists for component: GLPOC   No results for input(s): WBC, HGB, HCT, PLT, INR, HGBEXT, HCTEXT, PLTEXT, HGBEXT, HCTEXT, PLTEXT in the last 72 hours.     No lab exists for component: Franci Aquino  Recent Labs      17   NA  141   --    K  3.8   --    CL  102   --    CO2  30   --    GLU  98 --    BUN  13   --    CREA  0.79*   --    MG   --   2.2   CA  7.8*   --    TROIQ   --   0.03   BNPP   --   85       EKG and imaging reviewed personally by me  No results found. Results for orders placed or performed during the hospital encounter of 02/01/17   EKG, 12 LEAD, INITIAL   Result Value Ref Range    Systolic BP  mmHg    Diastolic BP  mmHg    Ventricular Rate 86 BPM    Atrial Rate 86 BPM    P-R Interval 166 ms    QRS Duration 162 ms    Q-T Interval 378 ms    QTC Calculation (Bezet) 452 ms    Calculated P Axis 57 degrees    Calculated R Axis -66 degrees    Calculated T Axis 44 degrees    Diagnosis       !! AGE AND GENDER SPECIFIC ECG ANALYSIS !! Normal sinus rhythm  Right bundle branch block  Left anterior fascicular block  !!! Bifascicular block !!! Possible Lateral infarct , age undetermined  Abnormal ECG  When compared with ECG of 27-OCT-2016 07:04,  Left anterior fascicular block is now Present  Nonspecific T wave abnormality, worse in Inferior leads  Nonspecific T wave abnormality now evident in Anterior leads  QT has shortened  Confirmed by Rosalinda Suarez MD (), ROSHAN CORDON ([id number]) on 2/1/2017 10:18:18 PM       XR Results (most recent):    Results from Hospital Encounter encounter on 02/01/17   XR CHEST PORT   Narrative AP PORTABLE CHEST X-RAY    HISTORY: CV surgery weeks ago. Posterior chest pain with slight fever    COMPARISON: 11/7/2016    FINDINGS: Median sternotomy wires are present. EKG leads are present. Lung  volumes are diminished. There is no lobar consolidation or large pleural  effusions. Impression IMPRESSION: Low lung volumes.         Active problems  Active Hospital Problems    Diagnosis Date Noted    Pneumonia 02/05/2017    Sepsis ([facility] [address] 02/05/2017    Diarrhea 02/05/2017    Syncope and collapse 02/05/2017    Acute encephalopathy 02/01/2017       ASSESSMENT  AND PLAN      · Encephalopathy- likely secondary to pna and sepsis- currently resolved  · Likely pna- subtle infiltrates on ct chest- will treat for pna and f/up cultures and clinical resolution  · Suspected syncope and collapse vs fall- PE workup negative- orthostatics- pt was unable to complete- stated to weak and leg pain  · Cognitive deficits- pt passed MMSE today  · Edema- bl leg swelling appears to be improving  · Diarrhea- resolved- cidff negative  · CAD- no active issues    DVT Prophylaxis: heparin    Plan for dc planning pt recommendations    Signed By: Marcial Ponce MD     February 5, 2017

## 2017-02-05 NOTE — PROGRESS NOTES
OT Note:    OT attempted to see patient this morning for therapy. Pt did not want to participate at this time. OT will re-attempt to see patient at a later date/time.     Thanks,  Lorenzo Moya

## 2017-02-06 NOTE — DISCHARGE SUMMARY
Hospitalist Discharge Summary     Patient ID:  Quyen Mejía  [id number]  63 y.o.  1935  Admit date: 2/1/2017  7:57 PM  Discharge date and time: 2/6/2017  Attending: Sherryle Paget, DO  PCP:  Gio Tejeda MD  Treatment Team: Attending Provider: Sherryle Paget, DO; Utilization Review: Victor M Sousa; Care Manager: Maritza Mg RN    Principal Diagnosis Acute encephalopathy   Principal Problem:    Acute encephalopathy (2/1/2017)    Active Problems:    Pneumonia (2/5/2017)      Sepsis ([address] (2/5/2017)      Diarrhea (2/5/2017)      Syncope and collapse (2/5/2017)             Hospital Course:  Please refer to the admission H&P and progress notes for details of presentation and hospital stay. In summary, Quyen Mejía is a 80 y.o.  male who presented with weakness and frequent falls. On the morning of 02/01/2017 he was found down on the floor and called EMS for help. He  Was then checked on by family later in the day and he was on the floor again. He cannot remember why or how he fell. He admitted to neuropathy. His son stated he is more confused although he was A&Ox3 and would answer some questions, however, he keeps repeating \"alrighty I don't know\" over and over again when asked what happened to him. No focal neuro deficits per patient. UA was pending. WBC normal, lactic acid normal, CT head negative. He is on plavix and baby aspirin following CABG and AV replacement a few months. The patient was admitted and started spiking fevers. With each fever spike he would become confused. A septic workup was sent and the pt was started on abx. A cognitive workup revealed that the pt higher executive function was compromised although he was A&O x 3. There was a concern that he would not be able to return home to live on his own. Given his unwitnessed fall a syncope workup was done and was negative.  He refused a MRI brain stating he was told by cardiology that he could not have one but we spoke with his cardiologist and vascular surgeon and they stated an MRI was ok. His family was unsure why the pt was declining the MRI and because he was adamant about it not being done we did not pursue it in case there was another underlying reason that he could not recall. After the abx where started his fevers subsided and his mentation improved. As he became more oriented he became less co-operative, requesting to go home. \"Doctors just \"fishing for something\". He refused to believe that he was ever confused. Ct chest showed some infiltrates concerning for a pneumonia. No other infectious source found. He started having diarrhea- cdiff was negative and this resolved. Pt was concerned that he is a high fall risk and needs rehab- the pt is refusing inpt rehab and wants home PT. He refused to walk with respiratory to assess his oxygen requirements with activity or his chronotropic response. We repeated his cognition tests and he is competent to make his own decisions and to go home. He is being dc with home health services. Significant Diagnostic Studies:   HEAD CT WITHOUT CONTRAST 2/1/2017      HISTORY: Confusion/delirium, altered LOC, unexplained symptoms present today     TECHNIQUE: Noncontrast axial images were obtained through the brain. All CT  scans at this facility used dose modulation, interactive reconstruction and/or  weight based dosing when appropriate to reduce radiation dose to as low as  reasonably achievable.     COMPARISON: 1/21/2013     FINDINGS: There is no acute intracranial hemorrhage, significant mass effect or  CT evidence of acute large artery territorial infarction. Please note that a  hyperacute infarct or small vessel infarct may not be apparent on initial CT  imaging.     Moderate diffuse cerebral volume loss is present. A few areas of low-attenuation  are present in the periventricular white matter.  This finding may be present  with chronic small vessel ischemic disease.     There is no hydrocephalus , intra-axial mass or abnormal extra-axial fluid  collection. There are no displaced skull fractures. The mastoid air cells and  paranasal sinuses are clear where imaged.     IMPRESSION  IMPRESSION:      1. Cerebral volume loss.     2. No acute intracranial hemorrhage. Exam: CT angiography of the chest and CT abdomen and pelvis with coronal  reformats following administration of 100 cc Isovue 370 IV contrast. The mA was  adjusted using dose modulation to reduce patient radiation exposure.     Indication: Shortness of breath with elevated d-dimer and fever     Comparison: None available     Findings:   CTA chest:  No evidence of pulmonary artery filling defects. Suboptimal evaluation the  distal segmental and subsegmental pulmonary arteries due to mild respiratory  motion. No evidence of acute aortic injury.     Mild centrilobular emphysematous change. There is asymmetric elevation of the  right hemidiaphragm with mild overlying atelectasis, difficult to completely  exclude a subtle infiltrate in the posterior right upper lobe and right lower  lobe. Trace right pleural effusion. The central airways clear. No abnormal  mediastinal or hilar lymphadenopathy. No pericardial effusion. Changes of prior  median sternotomy.     CT abdomen: The liver, gallbladder, spleen, adrenal glands, and pancreas are unremarkable. Excreted intravenous contrast is seen within the renal collecting system, this  degrades evaluation for small renal stones. No hydronephrosis. Mild bilateral  perinephric stranding is likely physiologic given symmetry and lack of  hydronephrosis. The appendix is normal. No abnormally dilated loops of bowel. There is moderate volume colonic stool with slightly prominent stool bolus in  the rectum. Scattered colonic diverticula without evidence of diverticulitis. Abdominal aorta is nonaneurysmal with atherosclerotic calcifications.  No  evidence of free air or abdominal ascites.     CT PELVIS: Urinary bladder is partially decompressed but grossly unremarkable. The prostate is not enlarged. No free pelvic fluid. No pelvic or inguinal  lymphadenopathy. Scoliotic curvature with associated degenerative change  centered about L2-L3. No acute osseous abnormalities.     IMPRESSION  IMPRESSION:     No evidence of pulmonary embolus. Slightly suboptimal evaluation of the distal  segmental and subsegmental pulmonary arteries due to respiratory motion.     Trace right pleural effusion. Asymmetric elevation of the right hemidiaphragm  with overlying atelectasis, difficult to exclude a subtle infiltrate in the  inferior right upper and lower lobes.     Moderate volume colonic stool.     Diverticulosis without evidence of diverticulitis. Labs: Results:       Chemistry No results for input(s): GLU, NA, K, CL, CO2, BUN, CREA, CA, AGAP, BUCR, TBIL, GPT, AP, TP, ALB, GLOB, AGRAT in the last 72 hours. CBC w/Diff No results for input(s): WBC, RBC, HGB, HCT, PLT, GRANS, LYMPH, EOS, HGBEXT, HCTEXT, PLTEXT in the last 72 hours. Cardiac Enzymes No results for input(s): CPK, CKND1, WAGNER in the last 72 hours. No lab exists for component: CKRMB, TROIP   Coagulation No results for input(s): PTP, INR, APTT in the last 72 hours. No lab exists for component: INREXT    Lipid Panel Lab Results   Component Value Date/Time    Cholesterol, total 110 02/02/2017 06:55 AM    Cholesterol, Total 162 02/07/2013 10:22 AM    HDL Cholesterol 45 02/02/2017 06:55 AM    HDL Cholesterol 50 02/07/2013 10:22 AM    LDL, calculated 46 02/02/2017 06:55 AM    VLDL, calculated 19 02/02/2017 06:55 AM    Triglyceride 95 02/02/2017 06:55 AM    Triglycerides 125 02/07/2013 10:22 AM    CHOL/HDL Ratio 2.4 02/02/2017 06:55 AM      BNP No results for input(s): BNPP in the last 72 hours. Liver Enzymes No results for input(s): TP, ALB, TBIL, AP, SGOT, GPT in the last 72 hours.     No lab exists for component: DBIL   Thyroid Studies Lab Results Component Value Date/Time    TSH 0.907 02/01/2017 08:10 PM            Discharge Exam:  Visit Vitals    /62 (BP 1 Location: Right arm, BP Patient Position: At rest)    Pulse (!) 56    Temp 98.2 °F (36.8 °C)    Resp 16    Ht 5' 10\" (1.778 m)    Wt 101.6 kg (224 lb)    SpO2 92%    BMI 32.14 kg/m2     General appearance: alert, cooperative, no distress, appears stated age  Lungs: clear to auscultation bilaterally  Heart: regular rate and rhythm, S1, S2 normal, no murmur, click, rub or gallop  Abdomen: soft, non-tender. Bowel sounds normal. No masses,  no organomegaly  Extremities: no cyanosis or edema  Neurologic: Grossly normal    Disposition: home  Discharge Condition: stable  Patient Instructions:   Current Discharge Medication List      START taking these medications    Details   levoFLOXacin (LEVAQUIN) 500 mg tablet Take 1 Tab by mouth daily for 4 days. Qty: 4 Tab, Refills: 0         CONTINUE these medications which have NOT CHANGED    Details   aspirin delayed-release 81 mg tablet Take 1 Tab by mouth daily. Qty: 90 Tab, Refills: 3      metoprolol succinate (TOPROL-XL) 25 mg XL tablet Take 0.5 Tabs by mouth daily. Qty: 45 Tab, Refills: 3      clopidogrel (PLAVIX) 75 mg tablet Take 1 Tab by mouth daily. Indications: Myocardial Reinfarction Prevention  Qty: 30 Tab, Refills: 2      potassium chloride (K-DUR, KLOR-CON) 20 mEq tablet Take 2 Tabs by mouth daily. Qty: 60 Tab, Refills: 2      atorvastatin (LIPITOR) 80 mg tablet Take 1 Tab by mouth nightly. Qty: 30 Tab, Refills: 2    Associated Diagnoses: Aortic valve stenosis, nonrheumatic; Other emphysema ([facility] [address] Encounter for weaning from respirator [facility]); S/P AVR; Status post coronary artery bypass graft; Unspecified sleep apnea; Hypoxemia; Physical debility; History of tobacco abuse; Pulmonary emphysema, unspecified emphysema type ([facility] [address]  Benign prostatic hyperplasia, presence of lower urinary tract symptoms unspecified, unspecified morphology; Postoperative anemia due to acute blood loss; Genital edema, male; DDD (degenerative disc disease), lumbar; Depression, unspecified depression type; History of colon polyps; HTN (hypertension), benign; Non-rheumatic mitral regurgitation; Impotence due to erectile dysfunction; Acquired hypothyroidism; Mixed hyperlipidemia; Gastroesophageal reflux disease without esophagitis; Diverticulosis of intestine without bleeding, unspecified intestinal tract location; Sciatica of right side; Carotid artery stenosis without cerebral infarction, unspecified laterality; RBBB (right bundle branch block with left anterior fascicular block)      furosemide (LASIX) 20 mg tablet Take 1 Tab by mouth two (2) times a day. Qty: 60 Tab, Refills: 2    Associated Diagnoses: Aortic valve stenosis, nonrheumatic; Other emphysema ([facility] [address] Encounter for weaning from respirator [facility]); S/P AVR; Status post coronary artery bypass graft; Unspecified sleep apnea; Hypoxemia; Physical debility; History of tobacco abuse; Pulmonary emphysema, unspecified emphysema type ([facility] [address] Benign prostatic hyperplasia, presence of lower urinary tract symptoms unspecified, unspecified morphology; Postoperative anemia due to acute blood loss; Genital edema, male; DDD (degenerative disc disease), lumbar; Depression, unspecified depression type; History of colon polyps; HTN (hypertension), benign; Non-rheumatic mitral regurgitation; Impotence due to erectile dysfunction; Acquired hypothyroidism; Mixed hyperlipidemia; Gastroesophageal reflux disease without esophagitis; Diverticulosis of intestine without bleeding, unspecified intestinal tract location; Sciatica of right side; Carotid artery stenosis without cerebral infarction, unspecified laterality; RBBB (right bundle branch block with left anterior fascicular block)      ferrous gluconate 324 mg (38 mg iron) tablet Take 1 Tab by mouth Daily (before breakfast).   Qty: 30 Tab, Refills: 0      oxyCODONE-acetaminophen (PERCOCET) 5-325 mg per tablet Take 1 Tab by mouth every four (4) hours as needed. Max Daily Amount: 6 Tabs. Qty: 30 Tab, Refills: 0      cyanocobalamin (VITAMIN B-12) 1,000 mcg tablet Take 1,000 mcg by mouth every morning. DULoxetine (CYMBALTA) 60 mg capsule Take 60 mg by mouth every morning. esomeprazole (NEXIUM) 40 mg capsule Take 40 mg by mouth every morning.      gabapentin (NEURONTIN) 300 mg capsule Take 300 mg by mouth two (2) times a day. levothyroxine (SYNTHROID) 150 mcg tablet Take 150 mcg by mouth Daily (before breakfast). tamsulosin (FLOMAX) 0.4 mg capsule Take 0.4 mg by mouth nightly. finasteride (PROSCAR) 5 mg tablet Take 5 mg by mouth nightly. diazepam (VALIUM) 5 mg tablet Take 5 mg by mouth every six (6) hours as needed for Anxiety. cyclobenzaprine (FLEXERIL) 10 mg tablet Take  by mouth three (3) times daily as needed for Muscle Spasm(s). traZODone (DESYREL) 150 mg tablet Take 150 mg by mouth nightly. multivitamins-minerals-lutein (CENTRUM SILVER) Tab Take 1 Tab by mouth every morning. Cholecalciferol, Vitamin D3, (VITAMIN D3) 1,000 unit cap Take 1,000 Units by mouth every morning. aspirin (ASPIRIN) 325 mg tablet Take 325 mg by mouth daily. FERROUS SULFATE (IRON PO) Take  by mouth.      metoprolol tartrate (LOPRESSOR) 25 mg tablet Take  by mouth two (2) times a day. MULTIVITAMIN PO Take  by mouth.          STOP taking these medications       amiodarone (CORDARONE) 200 mg tablet Comments:   Reason for Stopping:               Activity: as tolerated  Diet: cardiac  Wound Care: none    Follow-up  · PCP in one week  · Cardiologist as scheduled    Time spent to discharge patient 35 minutes  Signed:  Denise Peres MD  2/6/2017  4:46 PM

## 2017-02-06 NOTE — PROGRESS NOTES
[address]  Face to Face Encounter    Patients Name: Dhruv Reeves    YOB: 1935    Ordering Physician: Ruchi Uriarte    Primary Diagnosis: Acute encephalopathy    Date of Face to Face:   2/6/2017                                  Face to Face Encounter findings are related to primary reason for home care:   yes. 1. I certify that the patient needs intermittent care as follows: skilled nursing care:  teaching/training of sepsis protocol, education  physical therapy: strengthening  Has been falling  2. I certify that this patient is homebound, that is: 1) patient requires the use of a walker device, special transportation, or assistance of another to leave the home; or 2) patient's condition makes leaving the home medically contraindicated; and 3) patient has a normal inability to leave the home and leaving the home requires considerable and taxing effort. Patient may leave the home for infrequent and short duration for medical reasons, and occasional absences for non-medical reasons. Homebound status is due to the following functional limitations: Patient with strength deficits limiting the performance of all ADL's without caregiver assistance or the use of an assistive device. 3. I certify that this patient is under my care and that I, or a nurse practitioner or  [id number], or clinical nurse specialist, or certified nurse midwife, working with me, had a Face-to-Face Encounter that meets the physician Face-to-Face Encounter requirements. The following are the clinical findings from the [address] [place] [address] encounter that support the need for skilled services and is a summary of the encounter:     See discharge summary/Hospital chart      CHIKIS Serna  2/6/2017      THE FOLLOWING TO BE COMPLETED BY THE COMMUNITY PHYSICIAN:    I concur with the findings described above from the [facility] encounter that this patient is homebound and in need of a skilled service.     Certifying Physician: _____________________________________      Printed Certifying Physician Name: _____________________________________    Date: _________________

## 2017-02-06 NOTE — PROGRESS NOTES
Care Management Interventions  PCP Verified by CM: Yes  Transition of Care Consult (CM Consult): [address]: Yes  Physical Therapy Consult: Yes  Occupational Therapy Consult: Yes  Current Support Network: Lives Alone  Confirm Follow Up Transport: Family  Plan discussed with Pt/Family/Caregiver: Yes  Freedom of Choice Offered: Yes  Discharge Location  Discharge Placement: Home with home health   DC to home with [facility] and supportive family. Dorina Jaime

## 2017-02-06 NOTE — PROGRESS NOTES
Problem: Mobility Impaired (Adult and Pediatric)  Goal: *Acute Goals and Plan of Care (Insert Text)  LTG:  (1.)Mr. Lucie Mohan will move from supine to sit and sit to supine , scoot up and down and roll side to side in bed with MODIFIED INDEPENDENCE within 7 day(s). (2.)Mr. Lucie Mohan will transfer from bed to chair and chair to bed with MODIFIED INDEPENDENCE using the least restrictive device within 7 day(s). (3.)Mr. Lucie Mohan will ambulate with MODIFIED INDEPENDENCE for >vp=670 feet with the least restrictive device, appropriate gait pattern and good dynamic standing balance within 7 day(s). (4.)Mr. Lucie Mohan will ascend/descend 2 steps with 1-2 handrail(s) and CGA within 7 day(s) for safe entry/exit of home. (5.)Mr. Lucie Mohan will perform B LE therapeutic exercises x 20 reps with SUPERVISION within 7 days to increase strength for improved safety and independence in transfers and gait.  _______________________________________________________________________________________________  PHYSICAL THERAPY: Daily Note, Treatment Day: 3rd and PM 2/6/2017  INPATIENT: Hospital Day: 6  [place] / [place] / Product Type: Medicare /      NAME/AGE/GENDER: Lindsay Marcelo is a 80 y.o. male      PRIMARY DIAGNOSIS: Acute encephalopathy Acute encephalopathy Acute encephalopathy        ICD-10: Treatment Diagnosis:       · Generalized Muscle Weakness (M62.81)  · Difficulty in walking, Not elsewhere classified (R26.2)  · Repeated Falls (R29.6)  · History of falling (Z91.81)   Precaution/Allergies:  Review of patient's allergies indicates no known allergies. ASSESSMENT:      Mr. Lucie Mohan presents in supine and is agreeable to therapy. Pt spent time working on supine dressing activities (donning brief, pants, pads, etc) and transfers to sitting with supervision. Reviewed sit-stand transfer mechanics for safety.  Performs sit-stand transfers x3 with walker and on last attempt ambulates [de-identified]' in hallway with walker for support. Instructed on posture and walker management/safety and activity pacing. Pt somewhat slow with slightly antalgic pattern due to chronic right knee pain. Returned to room and up to edge of bed where O2 sats on room air are 93-94%. After break pt stood and took a few more steps over to chair. Positioned comfortably with LEs present and needs in reach. Some progress noted today with safety/independence with ambulation however not with gait distance. Pt would benefit from continued PT as he lives alone and needs to be mobile to return home. Recommended  PT which he is agreeable to. This section established at most recent assessment   PROBLEM LIST (Impairments causing functional limitations):  1. Decreased Strength  2. Decreased ADL/Functional Activities  3. Decreased Transfer Abilities  4. Decreased Ambulation Ability/Technique  5. Decreased Balance  6. Increased Pain  7. Decreased Activity Tolerance  8. Increased Shortness of Breath  9. Decreased Hana with Home Exercise Program    INTERVENTIONS PLANNED: (Benefits and precautions of physical therapy have been discussed with the patient.)  1. Balance Exercise  2. Bed Mobility  3. Family Education  4. Home Exercise Program (HEP)  5. Therapeutic Activites  6. Therapeutic Exercise/Strengthening  7. Transfer Training  8. Group Therapy      TREATMENT PLAN: Frequency/Duration: 3 times a week for duration of hospital stay  Rehabilitation Potential For Stated Goals: GOOD      RECOMMENDED REHABILITATION/EQUIPMENT: (at time of discharge pending progress): Home Health: Physical Therapy. HISTORY:   History of Present Injury/Illness (Reason for Referral):  Per chart: Jodi Weiss is a 80 y.o.  male who presents with weakness and frequent falls. This AM he was found down on floor and called EMS for help. He then was checked on by family later in the day and he was on the floor again. He cannot remember why he fell.  He admits to neuropathy. His son says he is more confused although he is A&Ox3 and will answer some questions, however, he keeps repeating \"alrighty I don't know\" over and over again when asked what happened to him. No focal neuro deficits per patient. UA is pending. WBC normal, lactic acid normal, CT head negative. Is on plavix and baby aspirin following CABG and AV replacement a few months ago. Past Medical History/Comorbidities:   Mr. Sunshine Dewitt  has a past medical history of Aortic valve stenosis, nonrheumatic (4/25/2016); Arthritis; Cor pulmonale ([facility] [address] Cyst near tailbone; DDD (degenerative disc disease), lumbar; Depression; Diverticulosis; Edema (04/25/2016); Former cigarette smoker; Genital edema, male (10/31/2016); Heart murmur; Hepatitis B; High cholesterol; History of colon polyps (5/13/2013); History of hepatitis A; History of staph infection; Long term (current) use of anticoagulants; Lumbago; SOB (shortness of breath); Spinal stenosis; TIA (transient ischemic attack) (5/13/2013); Transient ischemic attack (TIA) (2002); and Unspecified sleep apnea (05/21/2013). Mr. Sunshine Dewitt  has a past surgical history that includes cardiac surg procedure unlist; orthopaedic; hernia repair (Left); heart catheterization; colonoscopy; carotid endarterectomy (Left, 2002); and back surgery. Social History/Living Environment:   Home Environment: Private residence  # Steps to Enter: 2  Rails to Enter: Yes  Hand Rails : Bilateral  Wheelchair Ramp: No  One/Two Story Residence: Two story, live on 1st floor  # of Interior Steps: 12  Lift Chair Available: Yes  Living Alone: Yes  Support Systems: Family member(s)  Patient Expects to be Discharged to[de-identified] Private residence  Current DME Used/Available at Home: Walker, rolling  Tub or Shower Type: Tub/Shower combination  Prior Level of Function/Work/Activity:  Pt lives alone. Daughter-in-law comes to house 3 times/week to check on pt and take him for appointments/groceries.  Has Life Alert call system in place. Ambulates with a straight cane or rolling walker. Independent with all ADLs with shower chair. 2 recent falls. Home is 2 levels but all needs are on main level. Number of Personal Factors/Comorbidities that affect the Plan of Care: 1-2: MODERATE COMPLEXITY   EXAMINATION:   Most Recent Physical Functioning:   Gross Assessment:                  Posture:     Balance:  Sitting: Intact  Standing: Impaired  Standing - Static: Fair  Standing - Dynamic : Fair Bed Mobility:  Rolling: Supervision  Supine to Sit: Supervision  Scooting: Supervision  Wheelchair Mobility:     Transfers:  Sit to Stand: Contact guard assistance  Stand to Sit: Contact guard assistance  Bed to Chair: Contact guard assistance  Interventions: Verbal cues; Visual cues; Safety awareness training; Tactile cues  Duration: 29 Minutes  Gait:     Base of Support: Center of gravity altered  Speed/[place]: Slow;Fluctuations  Step Length: Left shortened;Right shortened  Gait Abnormalities: Trunk sway increased; Antalgic  Distance (ft): 80 Feet (ft)  Assistive Device: Walker, rolling;Gait belt  Ambulation - Level of Assistance: Contact guard assistance  Interventions: Verbal cues; Visual/Demos; Safety awareness training       Body Structures Involved:  1. Joints  2. Muscles Body Functions Affected:  1. Neuromusculoskeletal  2. Movement Related Activities and Participation Affected:  1. General Tasks and Demands  2. Mobility  3. Self Care   Number of elements that affect the Plan of Care: 4+: HIGH COMPLEXITY   CLINICAL PRESENTATION:   Presentation: Evolving clinical presentation with changing clinical characteristics: MODERATE COMPLEXITY   CLINICAL DECISION MAKIN [facility] Inpatient Short Form  How much difficulty does the patient currently have. .. Unable A Lot A Little None   1. Turning over in bed (including adjusting bedclothes, sheets and blankets)? [ ] 1   [ ] 2   [X] 3   [ ] 4   2.   Sitting down on and standing up from a chair with arms ( e.g., wheelchair, bedside commode, etc.)   [ ] 1   [ ] 2   [X] 3   [ ] 4   3. Moving from lying on back to sitting on the side of the bed? [ ] 1   [ ] 2   [X] 3   [ ] 4   How much help from another person does the patient currently need. .. Total A Lot A Little None   4. Moving to and from a bed to a chair (including a wheelchair)? [ ] 1   [ ] 2   [X] 3   [ ] 4   5. Need to walk in hospital room? [ ] 1   [X] 2   [ ] 3   [ ] 4   6. Climbing 3-5 steps with a railing? [ ] 1   [X] 2   [ ] 3   [ ] 4   © 2007, Trustees of [address] [place] [address] [address] [address], under license to [employer]. All rights reserved    Score:  Initial: 16 Most Recent: X (Date: -- )     Interpretation of Tool:  Represents activities that are increasingly more difficult (i.e. Bed mobility, Transfers, Gait). Score 24 23 22-20 19-15 14-10 9-7 6       Modifier CH CI CJ CK CL CM CN         · Mobility - Walking and Moving Around:               - CURRENT STATUS:    CK - 40%-59% impaired, limited or restricted               - GOAL STATUS:           CK - 40%-59% impaired, limited or restricted               - D/C STATUS:                       ---------------To be determined---------------  [place] / [place] / Product Type: Medicare /       Medical Necessity:     · Patient demonstrates good rehab potential due to higher previous functional level. Reason for Services/Other Comments:  · Patient continues to require present interventions due to patient's inability to function at baseline.    Use of outcome tool(s) and clinical judgement create a POC that gives a: Questionable prediction of patient's progress: MODERATE COMPLEXITY                 TREATMENT:   (In addition to Assessment/Re-Assessment sessions the following treatments were rendered)   Pre-treatment Symptoms/Complaints:  \"It's just me and my cat\"  Pain: Initial:   Pain Intensity 1: 0  Post Session:  0/10 Therapeutic Activity: (  29 Minutes ):  Therapeutic activities including bed mobility training, transfer training from various surface heights, static/dynamic standing balance activities, posture training, instruction in body mechanics/positioning with rolling walker, ambulation on level ground, and safety awareness training to improve mobility, strength, balance and activity tolerance. Required minimal Verbal cues; Visual/Demos; Safety awareness training to promote static and dynamic balance in standing, promote coordination of bilateral, lower extremity(s) and to promote improved technique with bed mobility, transfers, and safety awareness/impusiveness. Braces/Orthotics/Lines/Etc:   · None  Treatment/Session Assessment:    · Response to Treatment:  Tolerates activity without complications; O2 70% after activity  · Interdisciplinary Collaboration:  · Physical Therapist and Registered Nurse  · After treatment position/precautions:  · Up in chair, Bed/Chair-wheels locked, Bed in low position, Call light within reach and RN notified  · Compliance with Program/Exercises: Will assess as treatment progresses. · Recommendations/Intent for next treatment session: \"Next visit will focus on advancements to more challenging activities and reduction in assistance provided\".   Total Treatment Duration:  PT Patient Time In/Time Out  Time In: 1319  Time Out: [address] DPT

## 2017-02-06 NOTE — DISCHARGE INSTRUCTIONS
DISCHARGE SUMMARY from Nurse    The following personal items are in your possession at time of discharge:    Dental Appliances: Uppers (lowers will be brought tomorrow)  Visual Aid: Glasses        Jewelry:  (college ring, watch, wedding ring)  Clothing: Shorts  Other Valuables: None  Personal Items Sent to Safe:  (no)          PATIENT INSTRUCTIONS:    After general anesthesia or intravenous sedation, for 24 hours or while taking prescription Narcotics:  · Limit your activities  · Do not drive and operate hazardous machinery  · Do not make important personal or business decisions  · Do  not drink alcoholic beverages  · If you have not urinated within 8 hours after discharge, please contact your surgeon on call. Report the following to your surgeon:  · Excessive pain, swelling, redness or odor of or around the surgical area  · Temperature over 100.5  · Nausea and vomiting lasting longer than 4 hours or if unable to take medications  · Any signs of decreased circulation or nerve impairment to extremity: change in color, persistent  numbness, tingling, coldness or increase pain  · Any questions        What to do at Home:  Recommended activity: Activity as tolerated    If you experience any of the following symptoms Increased weakness, fever of 101.5 or higher, confusion, SOB, please follow up with your PCP. *  Please give a list of your current medications to your Primary Care Provider. *  Please update this list whenever your medications are discontinued, doses are      changed, or new medications (including over-the-counter products) are added. *  Please carry medication information at all times in case of emergency situations. These are general instructions for a healthy lifestyle:    No smoking/ No tobacco products/ Avoid exposure to second hand smoke    Surgeon General's Warning:  Quitting smoking now greatly reduces serious risk to your health.     Obesity, smoking, and sedentary lifestyle greatly increases your risk for illness    A healthy diet, regular physical exercise & weight monitoring are important for maintaining a healthy lifestyle    You may be retaining fluid if you have a history of heart failure or if you experience any of the following symptoms:  Weight gain of 3 pounds or more overnight or 5 pounds in a week, increased swelling in our hands or feet or shortness of breath while lying flat in bed. Please call your doctor as soon as you notice any of these symptoms; do not wait until your next office visit. Recognize signs and symptoms of STROKE:    F-face looks uneven    A-arms unable to move or move unevenly    S-speech slurred or non-existent    T-time-call 911 as soon as signs and symptoms begin-DO NOT go       Back to bed or wait to see if you get better-TIME IS BRAIN. Warning Signs of HEART ATTACK     Call 911 if you have these symptoms:   Chest discomfort. Most heart attacks involve discomfort in the center of the chest that lasts more than a few minutes, or that goes away and comes back. It can feel like uncomfortable pressure, squeezing, fullness, or pain.  Discomfort in other areas of the upper body. Symptoms can include pain or discomfort in one or both arms, the back, neck, jaw, or stomach.  Shortness of breath with or without chest discomfort.  Other signs may include breaking out in a cold sweat, nausea, or lightheadedness. Don't wait more than five minutes to call 911 - MINUTES MATTER! Fast action can save your life. Calling 911 is almost always the fastest way to get lifesaving treatment. Emergency Medical Services staff can begin treatment when they arrive -- up to an hour sooner than if someone gets to the hospital by car. The discharge information has been reviewed with the patient and daughter. The patient and daughter verbalized understanding.     Discharge medications reviewed with the patient and the daughter and appropriate educational materials and side effects teaching were provided.

## 2017-02-14 NOTE — PERIOP NOTES
Call placed to [place] at [facility] [phone] requesting consent from Dr. Maira Ward for patient to hold Plavix 7 days prior to surgery.

## 2017-02-14 NOTE — PERIOP NOTES
Recent Results (from the past 8 hour(s))   CBC WITH AUTOMATED DIFF    Collection Time: 02/14/17 11:10 AM   Result Value Ref Range    WBC 10.1 4.3 - 11.1 K/uL    RBC 5.04 4.23 - 5.67 M/uL    HGB 14.9 13.6 - 17.2 g/dL    HCT 47.2 41.1 - 50.3 %    MCV 93.7 79.6 - 97.8 FL    MCH 29.6 26.1 - 32.9 PG    MCHC 31.6 31.4 - 35.0 g/dL    RDW 14.6 11.9 - 14.6 %    PLATELET 989 900 - 071 K/uL    MPV 10.1 (L) 10.8 - 14.1 FL    DF AUTOMATED      NEUTROPHILS 82 (H) 43 - 78 %    LYMPHOCYTES 13 13 - 44 %    MONOCYTES 3 (L) 4.0 - 12.0 %    EOSINOPHILS 1 0.5 - 7.8 %    BASOPHILS 0 0.0 - 2.0 %    IMMATURE GRANULOCYTES 1.0 0.0 - 5.0 %    ABS. NEUTROPHILS 8.3 (H) 1.7 - 8.2 K/UL    ABS. LYMPHOCYTES 1.3 0.5 - 4.6 K/UL    ABS. MONOCYTES 0.3 0.1 - 1.3 K/UL    ABS. EOSINOPHILS 0.1 0.0 - 0.8 K/UL    ABS. BASOPHILS 0.0 0.0 - 0.2 K/UL    ABS. IMM.  GRANS. 0.1 0.0 - 0.5 K/UL   PROTHROMBIN TIME + INR    Collection Time: 02/14/17 11:10 AM   Result Value Ref Range    Prothrombin time 10.9 9.6 - 12.0 sec    INR 1.0 0.9 - 1.2     PTT    Collection Time: 02/14/17 11:10 AM   Result Value Ref Range    aPTT 25.7 25.3 - 56.8 SEC   METABOLIC PANEL, BASIC    Collection Time: 02/14/17 11:10 AM   Result Value Ref Range    Sodium 141 136 - 145 mmol/L    Potassium 4.2 3.5 - 5.1 mmol/L    Chloride 103 98 - 107 mmol/L    CO2 28 21 - 32 mmol/L    Anion gap 10 7 - 16 mmol/L    Glucose 171 (H) 65 - 100 mg/dL    BUN 16 8 - 23 MG/DL    Creatinine 1.02 0.8 - 1.5 MG/DL    GFR est AA >60 >60 ml/min/1.73m2    GFR est non-AA >60 >60 ml/min/1.73m2    Calcium 8.8 8.3 - 10.4 MG/DL

## 2017-02-14 NOTE — PROGRESS NOTES
17 1030   Oxygen Therapy   O2 Sat (%) 95 %   Pulse via Oximetry 78 beats per minute   O2 Device Room air   Pre-Treatment   Breath Sounds Bilateral Clear   Pre FEV1 (liters) 1.5 liters   % Predicted 54   Incentive Spirometry Treatment   Actual Volume (ml) 2500 ml     Initial respiratory Assessment completed with pt. Pt was interviewed and evaluated in Joint camp prior to surgery. Patient ID:  Yola Garner  [id number]  66 y.o.  1935  Surgeon: Dr. Rios Hanna  Date of Surgery: The linked surgery was not found. Please check manually. Procedure: Total Right Knee Arthroplasty  Primary Care Physician: Anh Arriaga [phone]  Specialists: [facility]                                 Pt instructed in the use of Incentive Spirometry. Pt instructed to bring Incentive Spirometer back on date of surgery & to start using Is upon return to pt room.     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      Pilondial cyst    DDD (degenerative disc disease), lumbar     Depression     Diverticulosis     Edema 2016     mostly RLE    Former cigarette smoker     Genital edema, male 10/31/2016    GERD (gastroesophageal reflux disease)      takes Nexium    Heart murmur     Hepatitis B     High cholesterol     History of colon polyps 2013    History of hepatitis A      about 40 years ago    History of staph infection     Hypertension      takes [facility] term (current) use of anticoagulants      Plavix and Aspirin 325mg    Lumbago     Morbid obesity (HCC)     SOB (shortness of breath)     Spinal stenosis     Thyroid disease      hypothyroidism    TIA (transient ischemic attack) 2013    Transient ischemic attack (TIA) 2002    Unspecified sleep apnea 2013     pt denies                                                                                                                                                         Respiratory history:HX OF HYPOXIA                                  DENIES SOB                                 HX OF LIAM? NO        Respiratory meds:                                                     PAST SLEEP STUDY:          NO                                        FAMILY PRESENT:   DAUGHTER-IN-LAW                                    LIAM assessment:                                               SLEEPS ON   BACK                                                    PHYSICAL EXAM   Body mass index is 30.71 kg/(m^2). Visit Vitals    /76 (BP 1 Location: Right arm, BP Patient Position: Sitting)    Pulse 74    Temp 97.8 °F (36.6 °C)    Resp 16    Ht 5' 10\" (1.778 m)    Wt 97.1 kg (214 lb)    SpO2 98%    BMI 30.71 kg/m2     Neck circumference:44      cm    Loud snoring: DENIES    Witnessed apnea or wakening gasping or choking:,DENIES    Awakens with headaches:DENIES    Morning or daytime tiredness/ sleepiness:DENIES      Dry mouth or sore throat in morning: DENIES    Freidman stage:4    SACS score:15    STOP/BAN                              CPAP:NONE                 CONT SAT HS        Referrals:PT DOES NOT DESIRE FOLLOW UP OF\" PROBABLE LIAM\"    Pt.  Phone Number:

## 2017-02-14 NOTE — PROGRESS NOTES
Jamie Host  : [birth year](43 y.o.) [address] at [address] [facility]  [facility] [address]  [phone]       Physical Therapy Prehab Plan of Treatment and Evaluation Summary:2017    ICD-10: Treatment Diagnosis:   · Pain in Right Knee (M25.561)  · Stiffness of Right Knee, Not elsewhere classified (M25.661)  Precautions/Allergies:   Review of patient's allergies indicates no known allergies. MEDICAL/REFERRING DIAGNOSIS:  Unilateral primary osteoarthritis, right knee [M17.11]  REFERRING PHYSICIAN: Shaquille Chamberlain,*  DATE OF SURGERY: 17   Assessment:   Comments:  He is motivated and prepared for surgery. His dtr in law is with him and she will assist at NH when He returns to his home. He has had 3 lumbar discsectomies     PROBLEM LIST (Impacting functional limitations):  Mr. Tomeka Mayberry presents with the following right lower extremity(s) problems:  1. Strength  2. Range of Motion  3. Home Exercise Program  4. Pain   INTERVENTIONS PLANNED:  1. Home Exercise Program  2. Educational Discussion     TREATMENT PLAN: Effective Dates: 2017 TO 2017. Frequency/Duration: Patient to continue to perform home exercise program at least twice per day up until his surgery. GOALS: (Goals have been discussed and agreed upon with patient.)  Discharge Goals: Time Frame: 1 Day  1. Patient will demonstrate independence with a home exercise program designed to increase strength, range of motion and pain control to minimize functional deficits and optimize patient for total joint replacement. Rehabilitation Potential For Stated Goals: Fair  Regarding Anand palomares, I certify that the treatment plan above will be carried out by a therapist or under their direction.   Thank you for this referral,  Markus Stokes, PT               HISTORY:   Present Symptoms:  Pain Intensity 1: 8 (at its worst)  Pain Location 1: Knee  Pain Orientation 1: Anterior, Lateral, Right   History of Present Injury/Illness (Reason for Referral):  Medical/Referring Diagnosis: Unilateral primary osteoarthritis, right knee [M17.11]   Past Medical History/Comorbidities:   Mr. Rola Evangelista  has a past medical history of Aortic valve stenosis, nonrheumatic (4/25/2016); Arthritis; Cor pulmonale ([facility] [address] Cyst near tailbone; DDD (degenerative disc disease), lumbar; Depression; Diverticulosis; Edema (04/25/2016); Former cigarette smoker; Genital edema, male (10/31/2016); Heart murmur; Hepatitis B; High cholesterol; History of colon polyps (5/13/2013); History of hepatitis A; History of staph infection; Long term (current) use of anticoagulants; Lumbago; SOB (shortness of breath); Spinal stenosis; TIA (transient ischemic attack) (5/13/2013); Transient ischemic attack (TIA) (2002); and Unspecified sleep apnea (05/21/2013). Mr. Rola Evangelista  has a past surgical history that includes cardiac surg procedure unlist; orthopaedic; hernia repair (Left); heart catheterization; colonoscopy; carotid endarterectomy (Left, 2002); and back surgery. Social History/Living Environment:   Home Environment: Private residence  # Steps to Enter: 2  Hand Rails : Bilateral  One/Two Story Residence: Two story, live on 1st floor  # of Interior Steps: 15  Interior Rails: Left  Lift Chair Available: Yes  Living Alone: Yes  Support Systems: Child(gali), Family member(s)  Patient Expects to be Discharged to[de-identified] Private residence  Current DME Used/Available at Home: Cinderella Reaper, straight, Walker, rolling, Wheelchair  Tub or Shower Type: Tub/Shower combination  Work/Activity:  Retired.  He is in a wc this am. Doesn't walk a lot   Dominant Side:  RIGHT  Current Medications:  See Pre-assessment nursing note   Number of Personal Factors/Comorbidities that affect the Plan of Care: 1-2: MODERATE COMPLEXITY   EXAMINATION:   ADLs (Current Functional Status):   Ambulation:  [x] Independent  [] Walk Indoors Only  [] Walk Outdoors  [x] Use Assistive Device  [] Use Wheelchair Only Dressing:  [x] [address] from Someone for:  [] Sock/Shoes  [] Pants  [] Everything   Bathing/Showering:   [x] Independent  [] Requires Assistance from Someone  [] Sponge Bath Only Household Activities:  [] Routine house and yard work  [x] Light Housework Only  [] None   Observation/Orthostatic Postural Assessment:    Leg length discrepancy, right (R LE 1/4'shorter than L)  ROM/Flexibility:   AROM: Generally decreased, functional (L knee )                       RLE AROM  R Knee Flexion: 110  R Knee Extension: 12   Strength:   Strength: Generally decreased, functional (L LE 4/5)              RLE Strength  R Hip Flexion: 4  R Knee Extension: 3+  R Ankle Dorsiflexion: 4   Functional Mobility:    Sensation: Intact (L LE)    Stand to Sit: Independent  Sit to Stand: Independent  Stand Pivot Transfers: Independent  Distance (ft): 20 Feet (ft)  Ambulation - Level of Assistance: Modified independent  Assistive Device: Walker, rolling  Speed/[place]: Pace decreased (<100 feet/min)  Gait Abnormalities: Antalgic (flexed trunk)          Balance:    Sitting: Intact  Standing: With support   Body Structures Involved:  1. Bones  2. Joints Body Functions Affected:  1. Neuromusculoskeletal  2. Movement Related Activities and Participation Affected:  1. Mobility   Number of elements that affect the Plan of Care: 4+: HIGH COMPLEXITY   CLINICAL PRESENTATION:   Presentation: Evolving clinical presentation with changing clinical characteristics: MODERATE COMPLEXITY   CLINICAL DECISION MAKING:   Outcome Measure: Tool Used: Lower Extremity Functional Scale (LEFS)  Score:  Initial: 21/80 Most Recent: X/80 (Date: -- )   Interpretation of Score: 20 questions each scored on a 5 point scale with 0 representing \"extreme difficulty or unable to perform\" and 4 representing \"no difficulty\". The lower the score, the greater the functional disability. 80/80 represents no disability.   Minimal detectable change is 9 points. Score 80 79-65 64-49 48-33 32-17 16-1 0   Modifier CH CI CJ CK CL CM CN     ? Mobility - Walking and Moving Around:     - CURRENT STATUS: CL - 60%-79% impaired, limited or restricted    - GOAL STATUS: CL - 60%-79% impaired, limited or restricted    - D/C STATUS:  CL - 60%-79% impaired, limited or restricted  Medical Necessity:   · Mr. Perera Samaritan is expected to optimize his lower extremity strength and ROM in preparation for joint replacement surgery. Reason for Services/Other Comments:  · Achieve baseline assesment of musculoskeletal system, functional mobility and home environment. , educate in PT HEP in preparation for surgery, educate in hospital plan of care. Use of outcome tool(s) and clinical judgement create a POC that gives a: Questionable prediction of patient's progress: MODERATE COMPLEXITY   TREATMENT:   Treatment/Session Assessment:  Patient was instructed in PT- HEP to increase strength and ROM in LEs. Answered all questions. · Post session pain:  2  · Compliance with Program/Exercises: Will assess as treatment progresses.   Total Treatment Duration:  PT Patient Time In/Time Out  Time In: 1030  Time Out: [address], [place]

## 2017-02-14 NOTE — PERIOP NOTES
Patient verified name, , and surgery as listed in [facility]. Type 3 surgery, joint camp assessment complete. Labs per surgeon: cbc, bmp, pt, ptt, ua ; results within anesthesia guidelines; printed/placed on chart~routed to PCP, Dr Bruno Kay and surgeon, Dr. Franklin Pablo for further review. Glucose 171~on front of chart for anesthesia review. Labs per anesthesia protocol: none  EKG:completed 17; ECHO report:  2/3/17;  17; ECHO report 16; most recent office note from Dr. Diana Tovar at [facility]. All placed on chart for anesthesia review. Hibiclens and instructions given per hospital policy. Nasal Swab collected per MD order and instructions for Mupirocin nasal ointment if required. Patient provided with handouts including Guide to Surgery, Pain Management, Hand Hygiene, Blood Transfusion Education, and [place] [facility] Brochure. Patient answered medical/surgical history questions at their best of ability. All prior to admission medications documented in [facility]. Original medication prescription bottle NOT visualized during patient appointment. Patient instructed to hold all vitamins 7 days prior to surgery and NSAIDS 5 days prior to surgery. Medications to be held prior to surgery Plavix hold for 7 days prior to surgery. Patient instructed to continue previous medications as prescribed prior to surgery and to take the following medications the day of surgery according to anesthesia guidelines with a small sip of water: Aspirin 81 mg, Cymbalta, Nexium, Neurontin, Synthroid, Potassium. Patient reminded to bring non-formulary Nexium on DOS. Patient taught back and verbalized understanding.

## 2017-02-15 NOTE — PERIOP NOTES
Received phone call from [facility] at [facility]. Reports Dr. Diana Tovar ok with pt holding plavix. Note in EMR under telephone encounter.

## 2017-02-15 NOTE — PERIOP NOTES
Nasal swab results reviewed:   Culture result:      Final   SA target not detected.                                 A MRSA NEGATIVE, SA NEGATIVE test result does not preclude MRSA or SA nasal colonization

## 2017-02-16 NOTE — ADVANCED PRACTICE NURSE
Total Joint Surgery Preoperative Chart Review      Patient ID:  Gladys Lewis  [id number]  72 y.o.  1935  Surgeon: Dr. Gilberto Luo  Date of Surgery: 2/23/2017  Procedure: Total Right Knee Arthroplasty  Primary Care Physician: Anni Lebron [phone]  Specialty Physician(s):      Subjective: Gladys Lewis is a 80 y.o. WHITE [place]  male who presents for preoperative evaluation for Total Right Knee arthroplasty. This is a preoperative chart review note based on data collected by the nurse at the surgical Pre-Assessment visit.     Past Medical History   Diagnosis Date    Aortic valve stenosis, nonrheumatic 4/25/2016    Arrhythmia      before heart surgery    Arthritis     CAD (coronary artery disease)     Cor pulmonale (HCC)     Cyst near tailbone      Pilondial cyst    DDD (degenerative disc disease), lumbar     Depression     Diverticulosis     Edema 04/25/2016     mostly RLE    Former cigarette smoker     Genital edema, male 10/31/2016    GERD (gastroesophageal reflux disease)      takes Nexium    Heart murmur     Hepatitis B     High cholesterol     History of colon polyps 5/13/2013    History of hepatitis A      about 40 years ago    History of staph infection     Hypertension      [facility] (current) use of anticoagulants      Plavix and Aspirin 325mg    Lumbago     Morbid obesity (HCC)     SOB (shortness of breath)     Spinal stenosis     Thyroid disease      hypothyroidism    TIA (transient ischemic attack) 5/13/2013    Transient ischemic attack (TIA) 2002    Unspecified sleep apnea 05/21/2013     pt denies      Past Surgical History   Procedure Laterality Date    Pr cardiac surg procedure unlist  10/2016     open heart surgery    Hx orthopaedic       discectomy x3    Hx hernia repair Left      L inguinal    Hx heart catheterization      Hx colonoscopy      Hx carotid endarterectomy Left 2002    Hx back surgery       x3    Hx heart valve surgery       aortic valve     Family History   Problem Relation Age of Onset    Cancer Mother      colon ca    Diabetes Mother     Hypertension Mother     Cancer Father      vocal cord ca      Social History   Substance Use Topics    Smoking status: Former Smoker     Packs/day: 2.00     Years: 35.00     Quit date: 1/21/2004    Smokeless tobacco: Never Used    Alcohol use Yes      Comment: very occassionally       Prior to Admission medications    Medication Sig Start Date End Date Taking? Authorizing Provider   FERROUS SULFATE (IRON PO) Take  by mouth daily. Yes Historical Provider   aspirin delayed-release 81 mg tablet Take 1 Tab by mouth daily. Patient taking differently: Take 81 mg by mouth daily. Take / use AM day of surgery  per anesthesia protocols. Indications: myocardial infarction prevention 1/19/17  Yes Olga Tijerina MD   clopidogrel (PLAVIX) 75 mg tablet Take 1 Tab by mouth daily. Indications: Myocardial Reinfarction Prevention 11/16/16  Yes Harmony Van MD   atorvastatin (LIPITOR) 80 mg tablet Take 1 Tab by mouth nightly. Patient taking differently: Take 80 mg by mouth nightly. Indications: hypercholesterolemia 11/14/16  Yes Harmony Van MD   oxyCODONE-acetaminophen (PERCOCET) 5-325 mg per tablet Take 1 Tab by mouth every four (4) hours as needed. Max Daily Amount: 6 Tabs. Patient taking differently: Take 1 Tab by mouth every four (4) hours as needed. Indications: Pain 10/31/16  Yes Romain Henderson NP   cyanocobalamin (VITAMIN B-12) 1,000 mcg tablet Take 1,000 mcg by mouth every morning. Yes Historical Provider   DULoxetine (CYMBALTA) 60 mg capsule Take 60 mg by mouth every morning. Take / use AM day of surgery  per anesthesia protocols. Indications: ANXIETY WITH DEPRESSION   Yes Historical Provider   esomeprazole (NEXIUM) 40 mg capsule Take 40 mg by mouth daily. Take / use AM day of surgery  per anesthesia protocols.   Indications: HEARTBURN   Yes Historical Provider   gabapentin (NEURONTIN) 300 mg capsule Take 300 mg by mouth daily. Takes 2 tablets every morning. Take / use AM day of surgery  per anesthesia protocols. Indications: NEUROPATHIC PAIN   Yes Historical Provider   levothyroxine (SYNTHROID) 150 mcg tablet Take 150 mcg by mouth Daily (before breakfast). Take / use AM day of surgery  per anesthesia protocols. Indications: hypothyroidism   Yes Historical Provider   tamsulosin (FLOMAX) 0.4 mg capsule Take 0.4 mg by mouth nightly. Indications: SYMPTOMATIC BENIGN PROSTATIC HYPERPLASIA   Yes Historical Provider   finasteride (PROSCAR) 5 mg tablet Take 5 mg by mouth nightly. Indications: SYMPTOMATIC BENIGN PROSTATIC HYPERPLASIA   Yes Historical Provider   diazepam (VALIUM) 5 mg tablet Take 5 mg by mouth every six (6) hours as needed for Anxiety. Yes Historical Provider   cyclobenzaprine (FLEXERIL) 10 mg tablet Take  by mouth three (3) times daily as needed for Muscle Spasm(s). Yes Historical Provider   multivitamins-minerals-lutein (CENTRUM SILVER) Tab Take 1 Tab by mouth every morning. Yes Historical Provider   Cholecalciferol, Vitamin D3, (VITAMIN D3) 1,000 unit cap Take 1,000 Units by mouth every morning. Yes Historical Provider   potassium chloride (K-DUR, KLOR-CON) 20 mEq tablet Take 2 Tabs by mouth daily. Patient taking differently: Take 20 mEq by mouth daily. Takes 2 tablets every morning. Take / use AM day of surgery  per anesthesia protocols. Indications: HYPOKALEMIA PREVENTION 11/14/16   Harmony Soto MD   traZODone (DESYREL) 150 mg tablet Take 150 mg by mouth nightly. Indications: insomnia associated with depression    Historical Provider     No Known Allergies       Objective:     Physical Exam:   No data found. ECG:    EKG Results     None          Data Review:   Labs:     Results for Abdon Raza (MRN [id number]) as of 2/16/2017 12:49   Ref.  Range 2/14/2017 11:10   WBC Latest Ref Range: 4.3 - 11.1 K/uL 10.1 RBC Latest Ref Range: 4.23 - 5.67 M/uL 5.04   HGB Latest Ref Range: 13.6 - 17.2 g/dL 14.9   HCT Latest Ref Range: 41.1 - 50.3 % 47.2   MCV Latest Ref Range: 79.6 - 97.8 FL 93.7   MCH Latest Ref Range: 26.1 - 32.9 PG 29.6   MCHC Latest Ref Range: 31.4 - 35.0 g/dL 31.6   RDW Latest Ref Range: 11.9 - 14.6 % 14.6   PLATELET Latest Ref Range: 150 - 450 K/uL 243   MPV Latest Ref Range: 10.8 - 14.1 FL 10.1 (L)   NEUTROPHILS Latest Ref Range: 43 - 78 % 82 (H)     Results for Wen Odom (MRN [id number]) as of 2/16/2017 12:49   Ref. Range 2/14/2017 11:10   INR Latest Ref Range: 0.9 - 1.2   1.0   Prothrombin time Latest Ref Range: 9.6 - 12.0 sec 10.9   aPTT Latest Ref Range: 25.3 - 32.9 SEC 25.7   Results for Wen Odom (MRN [id number]) as of 2/16/2017 12:49   Ref.  Range 2/14/2017 11:10   Sodium Latest Ref Range: 136 - 145 mmol/L 141   Potassium Latest Ref Range: 3.5 - 5.1 mmol/L 4.2   Chloride Latest Ref Range: 98 - 107 mmol/L 103   CO2 Latest Ref Range: 21 - 32 mmol/L 28   Anion gap Latest Ref Range: 7 - 16 mmol/L 10   Glucose Latest Ref Range: 65 - 100 mg/dL 171 (H)   BUN Latest Ref Range: 8 - 23 MG/DL 16   Creatinine Latest Ref Range: 0.8 - 1.5 MG/DL 1.02   Calcium Latest Ref Range: 8.3 - 10.4 MG/DL 8.8   GFR est non-AA Latest Ref Range: >60 ml/min/1.73m2 >60   GFR est AA Latest Ref Range: >60 ml/min/1.73m2 >60     Problem List:  )  Patient Active Problem List   Diagnosis Code    DDD (degenerative disc disease), lumbar M51.36    COPD (chronic obstructive pulmonary disease) (HCC) J44.9    Other peripheral vascular disease(443.89)     BPH (benign prostatic hypertrophy) N40.0    Depression F32.9    History of colon polyps Z86.010    Other and unspecified hyperlipidemia E78.5    Unspecified sleep apnea G47.30    Aortic valve stenosis, nonrheumatic I35.0    HTN (hypertension), benign I10    Mitral valve regurgitation I34.0    Impotence due to erectile dysfunction N52.9    Acquired hypothyroidism E03.9  Hyperlipidemia E78.5    GERD (gastroesophageal reflux disease) K21.9    Diverticulosis K57.90    Sciatica M54.30    Carotid artery stenosis without cerebral infarction I65.29    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            Signed By: NOMI San    February 16, 2017

## 2017-02-21 NOTE — H&P
H&P    Patient ID:  Sharon Haq  [id number]  62 y.o.  1935  Surgeon:  Tylor Randall MD  Date of Surgery: * No surgery date entered *  Procedure: Right Knee Total Arthroplasty  Primary Care Physician: Miya Goetz MD        Subjective: Sharon Haq is a 80 y.o. WHITE OR  male who presents with Right Knee pain. He has history of Right Knee pain for several years ago. Symptoms worse with walking, squatting, climbing stairs, weight bearing, initiation of activity and bathing and relieved with rest, NSAIDs: How long months, activity modification and steroid injections. Conservative treatment consisting of  activity modification, NSAIDs, analgesics and therapeutic injections into the knee has not helped. The patient  lives with their family. The patients goal after surgery is improved pain and function.         Past Medical History   Diagnosis Date    Aortic valve stenosis, nonrheumatic 4/25/2016    Arrhythmia      before heart surgery    Arthritis     CAD (coronary artery disease)     Cor pulmonale (HCC)     Cyst near tailbone      Pilondial cyst    DDD (degenerative disc disease), lumbar     Depression     Diverticulosis     Edema 04/25/2016     mostly RLE    Former cigarette smoker     Genital edema, male 10/31/2016    GERD (gastroesophageal reflux disease)      takes Nexium    Heart murmur     Hepatitis B     High cholesterol     History of colon polyps 5/13/2013    History of hepatitis A      about 40 years ago    History of staph infection     Hypertension      takes [facility] term (current) use of anticoagulants      Plavix and Aspirin 325mg    Lumbago     Morbid obesity (HCC)     SOB (shortness of breath)     Spinal stenosis     Thyroid disease      hypothyroidism    TIA (transient ischemic attack) 5/13/2013    Transient ischemic attack (TIA) 2002    Unspecified sleep apnea 05/21/2013     pt denies      Past Surgical History   Procedure Laterality Date    Pr cardiac surg procedure unlist  10/2016     open heart surgery    Hx orthopaedic       discectomy x3    Hx hernia repair Left      L inguinal    Hx heart catheterization      Hx colonoscopy      Hx carotid endarterectomy Left 2002    Hx back surgery       x3    Hx heart valve surgery       aortic valve     Family History   Problem Relation Age of Onset    Cancer Mother      colon ca    Diabetes Mother     Hypertension Mother     Cancer Father      vocal cord ca      Social History   Substance Use Topics    Smoking status: Former Smoker     Packs/day: 2.00     Years: 35.00     Quit date: 1/21/2004    Smokeless tobacco: Never Used    Alcohol use Yes      Comment: very occassionally       Prior to Admission medications    Medication Sig Start Date End Date Taking? Authorizing Provider   FERROUS SULFATE (IRON PO) Take  by mouth daily. Historical Provider   aspirin delayed-release 81 mg tablet Take 1 Tab by mouth daily. Patient taking differently: Take 81 mg by mouth daily. Take / use AM day of surgery  per anesthesia protocols. Indications: myocardial infarction prevention 1/19/17   Ana M Bo MD   clopidogrel (PLAVIX) 75 mg tablet Take 1 Tab by mouth daily. Indications: Myocardial Reinfarction Prevention 11/16/16   Harmony Fermin MD   potassium chloride (K-DUR, KLOR-CON) 20 mEq tablet Take 2 Tabs by mouth daily. Patient taking differently: Take 20 mEq by mouth daily. Takes 2 tablets every morning. Take / use AM day of surgery  per anesthesia protocols. Indications: HYPOKALEMIA PREVENTION 11/14/16   Harmony Fermin MD   atorvastatin (LIPITOR) 80 mg tablet Take 1 Tab by mouth nightly. Patient taking differently: Take 80 mg by mouth nightly. Indications: hypercholesterolemia 11/14/16   Harmony Fermin MD   oxyCODONE-acetaminophen (PERCOCET) 5-325 mg per tablet Take 1 Tab by mouth every four (4) hours as needed. Max Daily Amount: 6 Tabs.   Patient taking differently: Take 1 Tab by mouth every four (4) hours as needed. Indications: Pain 10/31/16   Marcelle Flores NP   cyanocobalamin (VITAMIN B-12) 1,000 mcg tablet Take 1,000 mcg by mouth every morning. Historical Provider   DULoxetine (CYMBALTA) 60 mg capsule Take 60 mg by mouth every morning. Take / use AM day of surgery  per anesthesia protocols. Indications: ANXIETY WITH DEPRESSION    Historical Provider   esomeprazole (NEXIUM) 40 mg capsule Take 40 mg by mouth daily. Take / use AM day of surgery  per anesthesia protocols. Indications: HEARTBURN    Historical Provider   gabapentin (NEURONTIN) 300 mg capsule Take 300 mg by mouth daily. Takes 2 tablets every morning. Take / use AM day of surgery  per anesthesia protocols. Indications: NEUROPATHIC PAIN    Historical Provider   levothyroxine (SYNTHROID) 150 mcg tablet Take 150 mcg by mouth Daily (before breakfast). Take / use AM day of surgery  per anesthesia protocols. Indications: hypothyroidism    Historical Provider   tamsulosin (FLOMAX) 0.4 mg capsule Take 0.4 mg by mouth nightly. Indications: SYMPTOMATIC BENIGN PROSTATIC HYPERPLASIA    Historical Provider   finasteride (PROSCAR) 5 mg tablet Take 5 mg by mouth nightly. Indications: SYMPTOMATIC BENIGN PROSTATIC HYPERPLASIA    Historical Provider   diazepam (VALIUM) 5 mg tablet Take 5 mg by mouth every six (6) hours as needed for Anxiety. Historical Provider   cyclobenzaprine (FLEXERIL) 10 mg tablet Take  by mouth three (3) times daily as needed for Muscle Spasm(s). Historical Provider   traZODone (DESYREL) 150 mg tablet Take 150 mg by mouth nightly. Indications: insomnia associated with depression    Historical Provider   multivitamins-minerals-lutein (CENTRUM SILVER) Tab Take 1 Tab by mouth every morning. Historical Provider   Cholecalciferol, Vitamin D3, (VITAMIN D3) 1,000 unit cap Take 1,000 Units by mouth every morning.     Historical Provider     No Known Allergies     REVIEW OF SYSTEMS:  CONSTITUTIONAL: Denies fever, decreased appetite, weight loss/gain, night sweats or fatigue. HEENT: Denies vision or hearing changes. denies glasses. denies hearing aids. CARDIAC: Denies CP, palpitations, rheumatic fever, murmur, peripheral edema, carotid artery disease or syncopal episodes. RESPIRATORY: Denies dyspnea on exertion, asthma, COPD or orthopnea. GI: Denies GERD, history of GI bleed or melena, PUD, hepatitis or cirrhosis. : Denies dysuria, hematuria. denies BPH symptoms. HEMATOLOGIC: Denies anemia or blood disorders. ENDOCRINE: Denies thyroid disease. MUSCULOSKELETAL: See HPI. NEUROLOGIC: Denies seizure, peripheral neuropathy or memory loss. PSYCH: Denies depression, anxiety or insomnia. SKIN: Denies rash or open sores. Objective:    PHYSICAL EXAM  GENERAL: No data found. EYES: PERRL. EOM intact. MOUTH:Teeth and Gums normal. NECK: Full ROM. Trachea midline. No thyromegaly or JVD. CARDIOVASCULAR: Regular rate and rhythm. No murmur or gallops. No carotid bruits. Peripheral pulses: radial  +, PT +, DP + bilaterally. LUNGS: CTA bilaterally. No wheezes, rhonchi or rales. GI: positive BS. Abdomen nontender. NEUROLOGIC: Alert and oriented x 3. Bilateral equal strong had grasp and bilateral equal strong plantar flexion and dorsiflexion. GAIT: normal  MUSCULOSKELETAL: ROM: diminished range with pain. Tenderness: Medial joint line and Patella. Crepitus: present. SKIN: No rash, bruising, swelling, redness or warmth. Labs:  No results found for this or any previous visit (from the past 24 hour(s)). Xray Right Knee: Subchondral sclerosis  Joint space narrowing  Bone on bone articulation    Assessment:  Advanced Right Knee Osteoarthritis. Total Right Knee Arthroplasty Indicated.   Patient Active Problem List   Diagnosis Code    DDD (degenerative disc disease), lumbar M51.36    COPD (chronic obstructive pulmonary disease) ([facility] [address] J44.9    Other peripheral vascular disease(443.89)     BPH (benign prostatic hypertrophy) N40.0    Depression F32.9    History of colon polyps Z86.010    Other and unspecified hyperlipidemia E78.5    Unspecified sleep apnea G47.30    Aortic valve stenosis, nonrheumatic I35.0    HTN (hypertension), benign I10    Mitral valve regurgitation I34.0    Impotence due to erectile dysfunction N52.9    Acquired hypothyroidism E03.9    Hyperlipidemia E78.5    GERD (gastroesophageal reflux disease) K21.9    Diverticulosis K57.90    Sciatica M54.30    Carotid artery stenosis without cerebral infarction I65.29    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 The patient had the opportunity to ask questions and have them answered to their satisfaction.      Signed:  JOHN Steel 2/21/2017

## 2017-02-23 PROBLEM — M17.11 ARTHRITIS OF KNEE, RIGHT: Status: ACTIVE | Noted: 2017-01-01

## 2017-02-23 PROBLEM — Z96.659 S/P TOTAL KNEE ARTHROPLASTY: Status: ACTIVE | Noted: 2017-01-01

## 2017-02-23 NOTE — PROGRESS NOTES
Problem: Self Care Deficits Care Plan (Adult)  Goal: *Acute Goals and Plan of Care (Insert Text)  GOALS:     1. Mr. Shannon Gonzales will perform lower body dressing using adaptive equipment PRN with min/mod assist within 1-3 day(s). 2. Mr. Shannon Gonzales will perform toileting and toilet transfers with min assist within 1-3day(s). 3. Mr. Shannon Gonzales will perform shower transfer and bathing using adaptive equipment PRN with min assist within 1-3 day(s). ________________________________________________________________________________________________      JOINT CAMP OCCUPATIONAL THERAPY TKA: Initial Assessment and PM 2/23/2017  INPATIENT: Hospital Day: 1  [place] / Product Type: Medicare /      NAME/AGE/GENDER: Brittany Tapia is a 80 y.o. male      PRIMARY DIAGNOSIS:  Unilateral primary osteoarthritis, right knee [M17.11]              Procedure(s) and Anesthesia Type:     * KNEE ARTHROPLASTY TOTAL/ RIGHT/ MEDACTA - Spinal (Right)  ICD-10: Treatment Diagnosis:        · Pain in Right Knee (M25.561)  · Stiffness of Right Knee, Not elsewhere classified (M25.661)  · Other lack of cordination (R27.8)       ASSESSMENT:      Mr. Shannon Gonzales is s/p right TKA and presents with decreased weight bearing on right LE and decreased independence with functional mobility and activities of daily living. Patient would benefit from skilled Occupational Therapy to maximize independence and safety with self-care task and functional mobility. Pt would also benefit from education on adaptive equipment and safety precautions in preparation for going home or for recommendations for post-hospital rehab program.  Patient plans for further rehab at home with home health services and good family support. OT reviewed therapy schedule and plan of care with patient. Patient was able to transfer and preform self care skills as charted below.   Patient instructed to call for assistance when needing to get up from the bed and all needs in reach. Patient verbalized understanding of call light. This section established at most recent assessment   PROBLEM LIST (Impairments causing functional limitations):  1. Decreased Strength  2. Decreased ADL/Functional Activities  3. Decreased Transfer Abilities  4. Increased Pain  5. Increased Fatigue  6. Decreased Flexibility/Joint Mobility  7. Decreased Knowledge of Precautions    INTERVENTIONS PLANNED: (Benefits and precautions of occupational therapy have been discussed with the patient.)  1. Activities of daily living training  2. Adaptive equipment training  3. Balance training  4. Clothing management  5. Donning&doffing training  6. Theraputic activity      TREATMENT PLAN: Frequency/Duration: Follow patient 1 time to address above goals. Rehabilitation Potential For Stated Goals: GOOD      RECOMMENDED REHABILITATION/EQUIPMENT: (at time of discharge pending progress): Continue Skilled Therapy and Home Health: Physical Therapy. OCCUPATIONAL PROFILE AND HISTORY:   History of Present Injury/Illness (Reason for Referral): Pt presents this date s/p (right) TKA. Past Medical History/Comorbidities:   Mr. Jess Mejia  has a past medical history of Aortic valve stenosis, nonrheumatic (4/25/2016); Arrhythmia; Arthritis; CAD (coronary artery disease); Cor pulmonale ([address] Cyst near tailbone; DDD (degenerative disc disease), lumbar; Depression; Diverticulosis; Edema (04/25/2016); Former cigarette smoker; Genital edema, male (10/31/2016); GERD (gastroesophageal reflux disease); Heart murmur; Hepatitis B; High cholesterol; History of colon polyps (5/13/2013); History of hepatitis A; History of staph infection; Hypertension; Long term (current) use of anticoagulants; Lumbago; Morbid obesity ([address] SOB (shortness of breath); Spinal stenosis; Thyroid disease; TIA (transient ischemic attack) (5/13/2013);  Transient ischemic attack (TIA) (2002); and Unspecified sleep apnea (05/21/2013). He also has no past medical history of Aneurysm ([facility] [address] Asthma; Autoimmune disease ([facility] [address] Cancer ([facility] [address] Chronic kidney disease; Chronic pain; Coagulation disorder ([facility] [address] Diabetes ([facility] [address] Difficult intubation; Endocarditis; Heart failure ([facility] [address] Malignant hyperthermia due to anesthesia; Nausea & vomiting; Pseudocholinesterase deficiency; PUD (peptic ulcer disease); Rheumatic fever; Seizures ([address] or Thromboembolus ([facility] [address] Mr. Geovanni Newby  has a past surgical history that includes cardiac surg procedure unlist (10/2016); orthopaedic; hernia repair (Left); heart catheterization; colonoscopy; carotid endarterectomy (Left, 2002); back surgery; and heart valve surgery. Social History/Living Environment:   Home Environment: Private residence  # Steps to Enter: 2  Hand Rails : Bilateral  One/Two Story Residence: Two story  # of Interior Steps: 14  Height of Each Step (in): 8 inches  Interior Rails: Left  Lift Chair Available: Yes  Living Alone: Yes  Support Systems: Child(gali), Family member(s)  Patient Expects to be Discharged to[de-identified] Private residence  Current DME Used/Available at Home: Walker, rolling, Wheelchair, Cane, straight, Tub transfer bench  Tub or Shower Type: Tub/Shower combination  Prior Level of Function/Work/Activity:  Mod I with ADLS using w/c and walker living alone      Number of Personal Factors/Comorbidities that affect the Plan of Care: Brief history (0):  LOW COMPLEXITY   ASSESSMENT OF OCCUPATIONAL PERFORMANCE[de-identified]   Most Recent Physical Functioning:   Balance  Sitting: Intact  Standing: Pull to stand; With support        Patient Vitals for the past 6 hrs:       BP BP Patient Position SpO2 O2 Flow Rate (L/min) Pulse   02/23/17 1012 107/52 Supine 96 % 3 l/min 83   02/23/17 1017 113/59 - 98 % 3 l/min 70   02/23/17 1022 109/58 - 97 % 3 l/min 72   02/23/17 1027 119/60 - 96 % 3 l/min 73   02/23/17 1042 113/56 - 95 % 3 l/min 80   02/23/17 1057 119/57 - 100 % 3 l/min 70   02/23/17 1114 119/56 - 100 % 3 l/min 69   02/23/17 1148 111/71 - 100 % - 70   02/23/17 1235 - - 100 % 2 l/min -   02/23/17 1248 - - 94 % - -        Gross Assessment  AROM: Generally decreased, functional (L LE)  Strength: Generally decreased, functional (LL E)  Sensation: Intact (LL E)              Coordination  Fine Motor Skills-Upper: Left Intact; Right Intact  Gross Motor Skills-Upper: Left Intact; Right Intact           Mental Status  Neurologic State: Alert  Orientation Level: Appropriate for age  Cognition: Appropriate decision making  Perception: Appears intact  Perseveration: No perseveration noted  Safety/Judgement: Awareness of environment; Fall prevention            RLE AROM  R Knee Flexion: 50  R Knee Extension: 20  RLE Strength  R Hip Flexion: 2  R Knee Extension: 2+  R Ankle Dorsiflexion: 2+  RLE Sensation  Light Touch: No apparent deficit       Basic ADLs (From Assessment) Complex ADLs (From Assessment)   Basic ADL  Feeding: Setup  Oral Facial Hygiene/Grooming: Supervision  Bathing: Moderate assistance  Upper Body Dressing: Supervision  Lower Body Dressing: Moderate assistance  Toileting: Total assistance     Grooming/Bathing/Dressing Activities of Daily Living     Cognitive Retraining  Safety/Judgement: Awareness of environment; Fall prevention                 Functional Transfers  Toilet Transfer : Minimum assistance;Assist x2  Shower Transfer: Minimum assistance;Assist x2     Bed/Mat Mobility  Supine to Sit: Moderate assistance  Sit to Supine: Moderate assistance  Sit to Stand: Minimum assistance;Assist x2           Physical Skills Involved:  1. Range of Motion  2. Balance  3. Mobility  4. Strength  5. Endurance Cognitive Skills Affected (resulting in the inability to perform in a timely and safe manner):  1. Problem Solving Psychosocial Skills Affected:  1. Routines and Behaviors  2.  Environmental Adaptations   Number of elements that affect the Plan of Care: 5+:  HIGH COMPLEXITY   CLINICAL DECISION MAKING:   [employer] AM-PAC 6 Clicks   Basic Mobility Inpatient Short Form  How much help from another person does the patient currently need. .. Total A Lot A Little None   1. Putting on and taking off regular lower body clothing?   [ ] 1   [X] 2   [ ] 3   [ ] 4   2. Bathing (including washing, rinsing, drying)? [ ] 1   [X] 2   [ ] 3   [ ] 4   3. Toileting, which includes using toilet, bedpan or urinal?   [X] 1   [ ] 2   [ ] 3   [ ] 4   4. Putting on and taking off regular upper body clothing?   [ ] 1   [ ] 2   [X] 3   [ ] 4   5. Taking care of personal grooming such as brushing teeth? [ ] 1   [ ] 2   [X] 3   [ ] 4   6. Eating meals? [ ] 1   [ ] 2   [ ] 3   [X] 4   © 2007, Trustees of [address] [place] [id number], under license to [employer]. All rights reserved   Score:  Initial: 15 Most Recent: X (Date: -- )     Interpretation of Tool:  Represents activities that are increasingly more difficult (i.e. Bed mobility, Transfers, Gait). Score 24 23 22-20 19-15 14-10 9-7 6       Modifier CH CI CJ CK CL CM CN         · Self Care:               - CURRENT STATUS:    CK - 40%-59% impaired, limited or restricted               - GOAL STATUS:           CJ - 20%-39% impaired, limited or restricted               - D/C STATUS:                       ---------------To be determined---------------  [place] / Product Type: Medicare /       Medical Necessity:     · Patient is expected to demonstrate progress in balance, coordination and functional technique to decrease assistance required with self care and functional mobility and improve safety during self care and functional mobility. Reason for Services/Other Comments:  · Patient continues to require skilled intervention due to decreased self care and functional mobility.    Use of outcome tool(s) and clinical judgement create a POC that gives a: MODERATE COMPLEXITY                 TREATMENT:   (In addition to Assessment/Re-Assessment sessions the following treatments were rendered)      Pre-treatment Symptoms/Complaints:  none  Pain: Initial:   Pain Intensity 1: 0  Pain Location 1: Knee  Pain Orientation 1: Right  Post Session:  0/10      Assessment/Reassessment only, no treatment provided today     Treatment/Session Assessment:         Response to Treatment:  Tolerated well. Education:  [ ] Home Exercises  [X] Fall Precautions  [ ] Hip Precautions [ ] Going Home Video  [X] Knee/Hip Prosthesis Review  [X] Walker Management/Safety [X] Adaptive Equipment as Needed         Interdisciplinary Collaboration:   · Physical Therapist  · Occupational Therapist  · Registered Nurse     After treatment position/precautions:   · Supine in bed  · Bed/Chair-wheels locked  · Bed in low position  · Call light within reach  · RN notified  · Side rails x 3     Compliance with Program/Exercises: Will assess as treatment progresses. Recommendations/Intent for next treatment session:  Treatment next visit will focus on increasing Mr. Deanna Garcia independence with bed mobility, transfers, gait training, strength/ROM exercises, modalities for pain, and patient education.        Total Treatment Duration:  OT Patient Time In/Time Out  Time In: 1440  Time Out: [facility],

## 2017-02-23 NOTE — PROGRESS NOTES
02/23/17 1235   Oxygen Therapy   O2 Sat (%) 100 %   Pulse via Oximetry 72 beats per minute   O2 Device Nasal cannula   O2 Flow Rate (L/min) 2 l/min  (placed on RA )   Joint Camp Notes Reviewed. Pt working on IS. Pt encouraged to do 10 breaths per hour while awake on IS. Good NPC. No respiratory distress noted at this time. No complications noted at this time.

## 2017-02-23 NOTE — IP AVS SNAPSHOT
Rosio Gardiner 
 
 
 [address] 
[phone] Patient: Agapito Portillo MRN: [id number] :1935 You are allergic to the following No active allergies Immunizations Administered for This Admission Name Date  
 TB Skin Test (PPD) Intradermal 2017 Recent Documentation Height  
  
  
  
  
  
 1.778 m Emergency Contacts Name Discharge Info Relation Home Work Mobile Martita Schwartz  Other Relative [6] [id number] Tessa Adán [22] [phone] About your hospitalization You were admitted on:  2017 You last received care in the:  [facility] 1 You were discharged on:  2017 Unit phone number:  [phone] Why you were hospitalized Your primary diagnosis was:  S/P Total Knee Arthroplasty Your diagnoses also included: Arthritis Of Knee, Right Providers Seen During Your Hospitalizations Provider Role Specialty Primary office phone Leo Castro MD Attending Provider Orthopedic Surgery [phone] Your Primary Care Physician (PCP) Primary Care Physician Office Phone Office Fax [facility] Follow-up Information Follow up With Details Comments Contact Info Kojo Lake MD  As needed [address] [facility] [address] [facility] [address] [phone] Leo Castro MD  As scheduled by office [place] [address] [address] [facility] [address] 
[phone]  [address] [facility]  Will contact you within 48 hrs [place]  [address] [facility] [address] [phone] Current Discharge Medication List  
  
START taking these medications Dose & Instructions Dispensing Information Comments Morning Noon Evening Bedtime oxyCODONE IR 10 mg Tab immediate release tablet Commonly known as:  Kemar Fuelling Your next dose is: Today Dose:  10 mg Take 1 Tab by mouth every four (4) hours as needed. Max Daily Amount: 60 mg.  
 Quantity:  60 Tab Refills:  0 CONTINUE these medications which have CHANGED Dose & Instructions Dispensing Information Comments Morning Noon Evening Bedtime  
 aspirin delayed-release 81 mg tablet What changed:  additional instructions Your next dose is:  Tomorrow Dose:  81 mg Take 1 Tab by mouth daily. Quantity:  90 Tab Refills:  3  
     
  
   
   
   
  
 potassium chloride 20 mEq tablet Commonly known as:  K-DUR, KLOR-CON What changed:   
- how much to take 
- additional instructions Your next dose is:  Tomorrow Dose:  40 mEq Take 2 Tabs by mouth daily. Quantity:  60 Tab Refills:  2 CONTINUE these medications which have NOT CHANGED Dose & Instructions Dispensing Information Comments Morning Noon Evening Bedtime ALEVE 220 mg Cap Generic drug:  naproxen sodium Notes to Patient:  DO NOT TAKE X 5 WEEKS!! Dose:  220 mg Take 220 mg by mouth as needed for Pain. Refills:  0  
     
   
   
   
  
 atorvastatin 80 mg tablet Commonly known as:  LIPITOR Your next dose is: Today Dose:  80 mg Take 1 Tab by mouth nightly. Quantity:  30 Tab Refills:  2 CENTRUM SILVER Tab tablet Generic drug:  multivitamins-minerals-lutein Your next dose is:  Tomorrow Dose:  1 Tab Take 1 Tab by mouth every morning. Refills:  0  
     
  
   
   
   
  
 clopidogrel 75 mg Tab Commonly known as:  PLAVIX Your next dose is:  Tomorrow Dose:  75 mg Take 1 Tab by mouth daily. Indications: Myocardial Reinfarction Prevention Quantity:  30 Tab Refills:  2  
     
  
   
   
   
  
 cyclobenzaprine 10 mg tablet Commonly known as:  FLEXERIL Your next dose is: Today Take  by mouth three (3) times daily as needed for Muscle Spasm(s). Refills:  0  
     
   
   
  
   
  
 CYMBALTA 60 mg capsule Generic drug:  DULoxetine Your next dose is:  Tomorrow Dose:  60 mg Take 60 mg by mouth every morning. Take / use AM day of surgery  per anesthesia protocols. Indications: ANXIETY WITH DEPRESSION Refills:  0  
     
  
   
   
   
  
 FLOMAX 0.4 mg capsule Generic drug:  tamsulosin Your next dose is: Today Dose:  0.4 mg Take 0.4 mg by mouth nightly. Indications: SYMPTOMATIC BENIGN PROSTATIC HYPERPLASIA Refills:  0  
     
   
   
   
  
  
 gabapentin 300 mg capsule Commonly known as:  NEURONTIN Your next dose is:  Tomorrow Dose:  300 mg Take 300 mg by mouth daily. Takes 2 tablets every morning. Take / use AM day of surgery  per anesthesia protocols. Indications: NEUROPATHIC PAIN Refills:  0 IRON PO Your next dose is:  Tomorrow Take  by mouth daily. Refills:  0 NexIUM 40 mg capsule Generic drug:  esomeprazole Your next dose is:  Tomorrow Dose:  40 mg Take 40 mg by mouth daily. Take / use AM day of surgery  per anesthesia protocols. Indications: HEARTBURN Refills:  0  
     
  
   
   
   
  
 oxyCODONE-acetaminophen 5-325 mg per tablet Commonly known as:  PERCOCET Dose:  1 Tab Take 1 Tab by mouth every four (4) hours as needed. Max Daily Amount: 6 Tabs. Quantity:  30 Tab Refills:  0 PROSCAR 5 mg tablet Generic drug:  finasteride Your next dose is: Today Dose:  5 mg Take 5 mg by mouth nightly. Indications: SYMPTOMATIC BENIGN PROSTATIC HYPERPLASIA Refills:  0  
     
   
   
   
  
  
 SYNTHROID 150 mcg tablet Generic drug:  levothyroxine Your next dose is:  Tomorrow Dose:  150 mcg Take 150 mcg by mouth Daily (before breakfast). Take / use AM day of surgery  per anesthesia protocols. Indications: hypothyroidism Refills:  0  
     
  
   
   
   
  
 traZODone 150 mg tablet Commonly known as:  Kera Frederic Your next dose is: Today Dose:  150 mg Take 150 mg by mouth nightly. Indications: insomnia associated with depression Refills:  0 VALIUM 5 mg tablet Generic drug:  diazePAM  
Your next dose is: Today Dose:  5 mg Take 5 mg by mouth every six (6) hours as needed for Anxiety. Refills:  0  
     
   
   
  
   
  
 VITAMIN B-12 1,000 mcg tablet Generic drug:  cyanocobalamin Your next dose is:  Tomorrow Dose:  1000 mcg Take 1,000 mcg by mouth every morning. Refills:  0  
     
  
   
   
   
  
 VITAMIN D3 1,000 unit Cap Generic drug:  cholecalciferol Your next dose is:  Tomorrow Dose:  1000 Units Take 1,000 Units by mouth every morning. Refills:  0 Where to Get Your Medications Information on where to get these meds will be given to you by the nurse or doctor. ! Ask your nurse or doctor about these medications  
  oxyCODONE IR 10 mg Tab immediate release tablet Discharge Instructions [employer] Patient Discharge Instructions Dhruv Reeves / [id number] : 1935 Admitted 2017 Discharged: 2017 IF YOU HAVE ANY PROBLEMS ONCE YOU ARE AT HOME CALL THE FOLLOWING NUMBERS:  
Main office number: [phone] Take Home Medications · It is important that you take the medication exactly as they are prescribed. · Keep your medication in the bottles provided by the pharmacist and keep a list of the medication names, dosages, and times to be taken in your wallet. · Do not take other medications without consulting your doctor. What to do at [facility] Resume your prehospital diet. If you have excessive nausea or vomitting call your doctor's office Home Physical Therapy is arranged. Use rolling walker when walking. Use Jayesh Hose stockings until we see you in the office for your follow up appointment with Dr. Jose Rafael Miramontes Patients who have had a joint replacement should not drive until you are seen for your follow up appointment by Dr. Jose Rafael Miramontes. When to Call - Call if you have a temperature greater then 101 
- Unable to keep food down - Loose control of your bladder or bowel function - Are unable to bear any weight  
- Need a pain medication refill DISCHARGE SUMMARY from Nurse The following personal items collected during your admission are returned to you:  
Dental Appliance: Dental Appliances: Lowers, Uppers, With patient Vision: Visual Aid: Glasses Hearing Aid:   na 
Jewelry: Jewelry: Ring, Watch Clothing: Clothing: At bedside Other Valuables: Other Valuables: None Valuables sent to safe: Personal Items Sent to Safe: n/a PATIENT INSTRUCTIONS: 
 
After general anesthesia or intravenous sedation, for 24 hours or while taking prescription Narcotics: · Limit your activities · Do not drive and operate hazardous machinery · Do not make important personal or business decisions · Do  not drink alcoholic beverages · If you have not urinated within 8 hours after discharge, please contact your surgeon on call. Report the following to your surgeon: 
· Excessive pain, swelling, redness or odor of or around the surgical area · Temperature over 101 · Nausea and vomiting lasting longer than 4 hours or if unable to take medications · Any signs of decreased circulation or nerve impairment to extremity: change in color, persistent  numbness, tingling, coldness or increase pain · Any questions, call office @ [phone] Keep scheduled follow up appointment. If need to change, call office @ [phone]. Total Knee Replacement: What to Expect at Home Your Recovery When you leave the hospital, you should be able to move around with a walker or crutches. But you will need someone to help you at home for the next few weeks or until you have more energy and can move around better. If there is no one to help you at home, you may go to a rehabilitation center. You will go home with a bandage and stitches or staples. Change the bandage as your doctor tells you to. Your doctor will remove your stitches or staples 10 to 21 days after your surgery. You may still have some mild pain, and the area may be swollen for 3 to 6 months after surgery. Your knee will continue to improve for 6 to 12 months. You will probably use a walker for 1 to 3 weeks and then use crutches. When you are ready, you can use a cane. You will probably be able to walk on your own in 4 to 8 weeks. You will need to do months of physical rehabilitation (rehab) after a knee replacement. Rehab will help you strengthen the muscles of the knee and help you regain movement. After you recover, your artificial knee will allow you to do normal daily activities with less pain or no pain at all. You may be able to hike, dance, ride a bike, and play golf. Talk to your doctor about whether you can do more strenuous activities. Always tell your caregivers that you have an artificial knee. How long it will take to walk on your own, return to normal activities, and go back to work depends on your health and how well your rehabilitation (rehab) program goes. The better you do with your rehab exercises, the quicker you will get your strength and movement back. This care sheet gives you a general idea about how long it will take for you to recover. But each person recovers at a different pace. Follow the steps below to get better as quickly as possible. How can you care for yourself at home? Activity · Rest when you feel tired. You may take a nap, but do not stay in bed all day. When you sit, use a chair with arms. You can use the arms to help you stand up. · Work with your physical therapist to find the best way to exercise. You may be able to take frequent, short walks using crutches or a walker. What you can do as your knee heals will depend on whether your new knee is cemented or uncemented. You may not be able to do certain things for a while if your new knee is uncemented. · After your knee has healed enough, you can do more strenuous activities with caution. ¨ You can golf, but use a golf cart, and do not wear shoes with spikes. ¨ You can bike on a flat road or on a stationary bike. Avoid biking up hills. ¨ Your doctor may suggest that you stay away from activities that put stress on your knee. These include tennis or badminton, squash or racquetball, contact sports like football, jumping (such as in basketball), jogging, or running. ¨ Avoid activities where you might fall. These include horseback riding, skiing, and mountain biking. · Do not sit for more than 1 hour at a time. Get up and walk around for a while before you sit again. If you must sit for a long time, prop up your leg with a chair or footstool. This will help you avoid swelling. · Ask your doctor when you can shower. You may need to take sponge baths until your stitches or staples have been removed. · Ask your doctor when you can drive again. It may take up to 8 weeks after knee replacement surgery before it is safe for you to drive. · When you get into a car, sit on the edge of the seat. Then pull in your legs, and turn to face the front. · You should be able to do many everyday activities 3 to 6 weeks after your surgery. You will probably need to take 4 to 16 weeks off from work. When you can go back to work depends on the type of work you do and how you feel. · Ask your doctor when it is okay for you to have sex. · Do not lift anything heavier than 10 pounds and do not lift weights for 12 weeks. Diet · By the time you leave the hospital, you should be eating your normal diet. If your stomach is upset, try bland, low-fat foods like plain rice, broiled chicken, toast, and yogurt. Your doctor may suggest that you take iron and vitamin supplements. · Drink plenty of fluids (unless your doctor tells you not to). · Eat healthy foods, and watch your portion sizes. Try to stay at your ideal weight. Too much weight puts more stress on your new knee. · You may notice that your bowel movements are not regular right after your surgery. This is common. Try to avoid constipation and straining with bowel movements. You may want to take a fiber supplement every day. If you have not had a bowel movement after a couple of days, ask your doctor about taking a mild laxative. Medicines · Your doctor will tell you if and when you can restart your medicines. He or she will also give you instructions about taking any new medicines. · If you take blood thinners, such as warfarin (Coumadin), clopidogrel (Plavix), or aspirin, be sure to talk to your doctor. He or she will tell you if and when to start taking those medicines again. Make sure that you understand exactly what your doctor wants you to do. · Your doctor may give you a blood-thinning medicine to prevent blood clots. If you take a blood thinner, be sure you get instructions about how to take your medicine safely. Blood thinners can cause serious bleeding problems. This medicine could be in pill form or as a shot (injection). If a shot is necessary, your doctor will tell you how to do this. · Be safe with medicines. Take pain medicines exactly as directed. ¨ If the doctor gave you a prescription medicine for pain, take it as prescribed. ¨ If you are not taking a prescription pain medicine, ask your doctor if you can take an over-the-counter medicine. ¨ Plan to take your pain medicine 30 minutes before exercises. It is easier to prevent pain before it starts than to stop it once it has started. · If you think your pain medicine is making you sick to your stomach: 
¨ Take your medicine after meals (unless your doctor has told you not to). ¨ Ask your doctor for a different pain medicine. · If your doctor prescribed antibiotics, take them as directed. Do not stop taking them just because you feel better. You need to take the full course of antibiotics. Incision care · You will have a bandage over the cut (incision) and staples or stitches. Take the bandage off when your doctor says it is okay. · Your doctor will remove the staples or stitches 10 days to 3 weeks after the surgery and replace them with strips of tape. Leave the tape on for a week or until it falls off. Exercise · Your rehab program will give you a number of exercises to do to help you get back your knee's range of motion and strength. Always do them as your therapist tells you. Ice and elevation · For pain and swelling, put ice or a cold pack on the area for 10 to 20 minutes at a time. Put a thin cloth between the ice and your skin. Other instructions · Continue to wear your support stockings as your doctor says. These help to prevent blood clots. The length of time that you will have to wear them depends on your activity level and the amount of swelling. · Wear medical alert jewelry that says you may need antibiotics before any procedure, including dental work. You can buy this at most drugstores. · You have metal pieces in your knee. These may set off some airport metal detectors. Carry a medical alert card that says you have an artificial joint, just in case. Follow-up care is a key part of your treatment and safety. Be sure to make and go to all appointments, and call your doctor if you are having problems.  It's also a good idea to know your test results and keep a list of the medicines you take. When should you call for help? Call 911 anytime you think you may need emergency care. For example, call if: 
· You passed out (lost consciousness). · You have severe trouble breathing. · You have sudden chest pain and shortness of breath, or you cough up blood. Call your doctor now or seek immediate medical care if: 
· You have signs of infection, such as: 
¨ Increased pain, swelling, warmth, or redness. ¨ Red streaks leading from the incision. ¨ Pus draining from the incision. ¨ A fever. · You have signs of a blood clot, such as: 
¨ Pain in your calf, back of the knee, thigh, or groin. ¨ Redness and swelling in your leg or groin. · Your incision comes open and begins to bleed, or the bleeding increases. · You have pain that does not get better after you take pain medicine. Watch closely for changes in your health, and be sure to contact your doctor if: 
· You do not have a bowel movement after taking a laxative. Where can you learn more? Go to http://ligia-александр.info/. Enter U697 in the search box to learn more about \"Total Knee Replacement: What to Expect at Home. \" Current as of: August 4, 2016 Content Version: 11.1 © 8279-5935 [employer]. Care instructions adapted under license by [employer] (which disclaims liability or warranty for this information). If you have questions about a medical condition or this instruction, always ask your healthcare professional. [place] [address] any warranty or liability for your use of this information. *  Please give a list of your current medications to your Primary Care Provider. *  Please update this list whenever your medications are discontinued, doses are 
    changed, or new medications (including over-the-counter products) are added. *  Please carry medication information at all times in case of emergency situations. These are general instructions for a healthy lifestyle: No smoking/ No tobacco products/ Avoid exposure to second hand smoke Surgeon General's Warning:  Quitting smoking now greatly reduces serious risk to your health. Obesity, smoking, and sedentary lifestyle greatly increases your risk for illness A healthy diet, regular physical exercise & weight monitoring are important for maintaining a healthy lifestyle You may be retaining fluid if you have a history of heart failure or if you experience any of the following symptoms:  Weight gain of 3 pounds or more overnight or 5 pounds in a week, increased swelling in our hands or feet or shortness of breath while lying flat in bed. Please call your doctor as soon as you notice any of these symptoms; do not wait until your next office visit. Recognize signs and symptoms of STROKE: 
 
F-face looks uneven A-arms unable to move or move even S-speech slurred or non-existent T-time-call 911 as soon as signs and symptoms begin-DO NOT go Back to bed or wait to see if you get better-TIME IS BRAIN. The discharge information has been reviewed with the patient. The patient verbalized understanding. Information obtained by : 
I understand that if any problems occur once I am at home I am to contact my physician. I understand and acknowledge receipt of the instructions indicated above. Physician's or R.N.'s Signature                                                                  Date/Time Patient or Representative Signature                                                          Date/Time Discharge Orders Procedure Order Date Status Priority Quantity Spec Type Associated Dx ACTIVITY AFTER DISCHARGE Patient should: Restrict lifting, Restrict driving 06/67/05 6965 Normal Routine 1  Status post total right knee replacement [[id number]] Questions: Patient should:  Restrict lifting Patient should:  Restrict driving DIET REGULAR No added salt 02/24/17 0821 Normal Routine 1  Status post total right knee replacement [[id number]] Questions: Additional options:  No added salt DRESSING, CHANGE SPECIFY 02/24/17 0821 Normal Routine 1  Status post total right knee replacement [[id number]] Comments:  If staples are present they are to be removed and steri strips placed 10-14 days  after surgery as long as wound is healing. If wound does not appear to be healing the patient is to be seen in the office before removing staples. REFERRAL TO HOME HEALTH 02/24/17 0821 Normal Routine 1  Status post total right knee replacement [[id number]] REFERRAL TO PHYSICAL THERAPY 02/24/17 0821 Normal Routine 1  Status post total right knee replacement [[id number]] Introducing [facility]! Luann Guaman introduces [employer] patient portal. Now you can access parts of your medical record, email your doctor's office, and request medication refills online. 1. In your internet browser, go to [email or web address]://[email or web address]. [employer] 2. Click on the First Time User? Click Here link in the Sign In box. You will see the New Member Sign Up page. 3. Enter your [employer] Access Code exactly as it appears below. You will not need to use this code after youve completed the sign-up process. If you do not sign up before the expiration date, you must request a new code. · [employer] Access Code: [email or web address] Expires: 5/1/2017 10:47 AM 
 
4. Enter the last four digits of your Social Security Number (xxxx) and Date of Birth (mm/dd/yyyy) as indicated and click Submit. You will be taken to the next sign-up page. 5. Create a [employer] ID. This will be your [employer] login ID and cannot be changed, so think of one that is secure and easy to remember. 6. Create a [employer] password. You can change your password at any time. 7. Enter your Password Reset Question and Answer. This can be used at a later time if you forget your password. 8. Enter your e-mail address. You will receive e-mail notification when new information is available in [address] 9. Click Sign Up. You can now view and download portions of your medical record. 10. Click the Download Summary menu link to download a portable copy of your medical information. If you have questions, please visit the Frequently Asked Questions section of the [employer] website. Remember, [employer] is NOT to be used for urgent needs. For medical emergencies, dial 911. Now available from your iPhone and Android! General Information Please provide this summary of care documentation to your next provider. Patient Signature:  ____________________________________________________________ Date:  ____________________________________________________________  
  
Betty Crockett Provider Signature:  ____________________________________________________________ Date:  ____________________________________________________________

## 2017-02-23 NOTE — PROGRESS NOTES
Problem: Mobility Impaired (Adult and Pediatric)  Goal: *Acute Goals and Plan of Care (Insert Text)  GOALS (1-4 days):  (1.)Mr. Brinda Leon will move from supine to sit and sit to supine in bed with MINIMAL ASSIST.  (2.)Mr. Brinda Leon will transfer from bed to chair and chair to bed with MINIMAL ASSIST using the least restrictive device. (3.)Mr. Brinda Leon will ambulate with MINIMAL ASSIST for 50 feet with the least restrictive device. (4.)Mr. Brinda Leon will ambulate up/down 4 steps with bilateral railing with MINIMAL ASSIST with no device. (5.)Mr. Brinda Leon will increase right knee ROM to 5°-80°.  ________________________________________________________________________________________________      PHYSICAL THERAPY JOINT CAMP TKA: INITIAL ASSESSMENT, PM 2/23/2017  INPATIENT: Hospital Day: 1  [place] / [place]: [place] / Product Type: Medicare /      NAME/AGE/GENDER: Og Hendricks is a 80 y.o. male      PRIMARY DIAGNOSIS:  Unilateral primary osteoarthritis, right knee [M17.11]              Procedure(s) and Anesthesia Type:     * KNEE ARTHROPLASTY TOTAL/ RIGHT/ MEDACTA - Spinal (Right)  ICD-10: Treatment Diagnosis:        · Pain in Right Knee (M25.561)  · Stiffness of Right Knee, Not elsewhere classified (M25.661)  · Difficulty in walking, Not elsewhere classified (R26.2)  · Other abnormalities of gait and mobility (R26.89)       ASSESSMENT:      Mr. Brinda Leon presents S/P R TKA and presents with decreased R LE strength and ROM, standing balance, functional mobility and TKA awareness. He would benefit from further PT while here. He plans on going hoe with family assistance at [place]. This section established at most recent assessment   PROBLEM LIST (Impairments causing functional limitations):  1. Decreased Strength  2. Decreased Transfer Abilities  3. Decreased Ambulation Ability/Technique  4. Decreased Balance  5. Increased Pain  6. Decreased Activity Tolerance  7. Increased Fatigue  8.  Decreased Flexibility/Joint Mobility  9. Decreased Knowledge of Precautions  10. Decreased [place] with Home Exercise Program    INTERVENTIONS PLANNED: (Benefits and precautions of physical therapy have been discussed with the patient.)  1. Balance Exercise  2. Bed Mobility  3. Cold  4. Gait Training  5. Home Exercise Program (HEP)  6. Therapeutic Exercise/Strengthening  7. Transfer Training  8. TKA education  9. Range of Motion: active/assisted/passive  10. Therapeutic Activities  11. Group Therapy      TREATMENT PLAN: Frequency/Duration: Follow patient BID   to address above goals. Rehabilitation Potential For Stated Goals: FAIR      RECOMMENDED REHABILITATION/EQUIPMENT: (at time of discharge pending progress): Continue Skilled Therapy and Home Health: Physical Therapy. HISTORY:   History of Present Injury/Illness (Reason for Referral):  S/P R TKA  Past Medical History/Comorbidities:   Mr. Angie Levine  has a past medical history of Aortic valve stenosis, nonrheumatic (4/25/2016); Arrhythmia; Arthritis; CAD (coronary artery disease); Cor pulmonale ([address] Cyst near tailbone; DDD (degenerative disc disease), lumbar; Depression; Diverticulosis; Edema (04/25/2016); Former cigarette smoker; Genital edema, male (10/31/2016); GERD (gastroesophageal reflux disease); Heart murmur; Hepatitis B; High cholesterol; History of colon polyps (5/13/2013); History of hepatitis A; History of staph infection; Hypertension; Long term (current) use of anticoagulants; Lumbago; Morbid obesity ([address] SOB (shortness of breath); Spinal stenosis; Thyroid disease; TIA (transient ischemic attack) (5/13/2013); Transient ischemic attack (TIA) (2002); and Unspecified sleep apnea (05/21/2013). He also has no past medical history of Aneurysm ([address] Asthma; Autoimmune disease ([address] Cancer ([address] Chronic kidney disease; Chronic pain; Coagulation disorder ([address] Diabetes ([address] Difficult intubation; Endocarditis;  Heart failure ([address] Malignant hyperthermia due to anesthesia; Nausea & vomiting; Pseudocholinesterase deficiency; PUD (peptic ulcer disease); Rheumatic fever; Seizures ([facility] [address] or Thromboembolus ([facility] [address] Mr. Jodi Lira  has a past surgical history that includes cardiac surg procedure unlist (10/2016); orthopaedic; hernia repair (Left); heart catheterization; colonoscopy; carotid endarterectomy (Left, 2002); back surgery; and heart valve surgery. Social History/Living Environment:   Home Environment: Private residence  # Steps to Enter: 2  Hand Rails : Bilateral  One/Two Story Residence: Two story  # of Interior Steps: 14  Height of Each Step (in): 8 inches  Interior Rails: Left  Lift Chair Available: Yes  Living Alone: Yes  Support Systems: Child(gali), Family member(s)  Patient Expects to be Discharged to[de-identified] Private residence  Current DME Used/Available at Home: Walker, rolling, Wheelchair, Cane, straight, Tub transfer bench  Tub or Shower Type: Tub/Shower combination  Prior Level of Function/Work/Activity:  Lives alone, can bathe himself, but uses a wc in the home   Number of Personal Factors/Comorbidities that affect the Plan of Care: 1-2: MODERATE COMPLEXITY   EXAMINATION:   Most Recent Physical Functioning:      Gross Assessment  AROM: Generally decreased, functional (L LE)  Strength: Generally decreased, functional (LL E)  Sensation: Intact (LL E)         RLE AROM  R Knee Flexion: 50  R Knee Extension: 20        RLE Strength  R Hip Flexion: 2  R Knee Extension: 2+  R Ankle Dorsiflexion: 2+     Bed Mobility  Supine to Sit: Moderate assistance  Sit to Supine: Moderate assistance     Transfers  Sit to Stand: Minimum assistance;Assist x2  Stand to Sit: Minimum assistance;Assist x2     Balance  Sitting: Intact  Standing: Pull to stand; With support    Posture  Posture Assessment: Kyphosis; Forward head;Rounded shoulders           Weight Bearing Status  Right Side Weight Bearing: As tolerated  Distance (ft): 5 Feet (ft)  Ambulation - Level of Assistance: Minimal assistance;Assist x2  Assistive Device: Walker, rolling  Base of Support: Widened  Speed/[place]: Pace decreased (<100 feet/min); Slow  Step Length: Left shortened  Stance: Right decreased  Gait Abnormalities: Antalgic; Shuffling gait (flexed at trunk)  Interventions: Safety awareness training;Verbal cues; Visual/[place]      Braces/Orthotics:      Right Knee Cold  Type: Cryocuff       Body Structures Involved:  1. Joints  2. Muscles Body Functions Affected:  1. Neuromusculoskeletal  2. Movement Related Activities and Participation Affected:  1. Mobility  2. Self Care   Number of elements that affect the Plan of Care: 4+: HIGH COMPLEXITY   CLINICAL PRESENTATION:   Presentation: Stable and uncomplicated: LOW COMPLEXITY   CLINICAL DECISION MAKIN [facility] [id number] AM-PAC 6 Clicks   Basic Mobility Inpatient Short Form  How much difficulty does the patient currently have. .. Unable A Lot A Little None   1. Turning over in bed (including adjusting bedclothes, sheets and blankets)? [ ] 1   [X] 2   [ ] 3   [ ] 4   2. Sitting down on and standing up from a chair with arms ( e.g., wheelchair, bedside commode, etc.)   [ ] 1   [X] 2   [ ] 3   [ ] 4   3. Moving from lying on back to sitting on the side of the bed? [ ] 1   [X] 2   [ ] 3   [ ] 4   How much help from another person does the patient currently need. .. Total A Lot A Little None   4. Moving to and from a bed to a chair (including a wheelchair)? [ ] 1   [X] 2   [ ] 3   [ ] 4   5. Need to walk in hospital room? [X] 1   [ ] 2   [ ] 3   [ ] 4   6. Climbing 3-5 steps with a railing? [X] 1   [ ] 2   [ ] 3   [ ] 4   © , Trustees of [address] [place] [address] [address], under license to [employer]. All rights reserved       Score:  Initial: 10 Most Recent: X (Date: -- )     Interpretation of Tool:  Represents activities that are increasingly more difficult (i.e. Bed mobility, Transfers, Gait).        Score 24 23 22-20 19-15 14-10 9-7 6       Modifier CH CI CJ CK CL CM CN         · Mobility - Walking and Moving Around:               - CURRENT STATUS:    CL - 60%-79% impaired, limited or restricted               - GOAL STATUS:           CK - 40%-59% impaired, limited or restricted               - D/C STATUS:                       ---------------To be determined---------------  [place] / [place] / Product Type: Medicare /       Medical Necessity:     · Patient demonstrates fair rehab potential due to higher previous functional level. Reason for Services/Other Comments:  · Patient continues to require present interventions due to patient's inability to perform functional mobility. Use of outcome tool(s) and clinical judgement create a POC that gives a: Questionable prediction of patient's progress: MODERATE COMPLEXITY                 TREATMENT:   (In addition to Assessment/Re-Assessment sessions the following treatments were rendered)      Pre-treatment Symptoms/Complaints:  tired  Pain: Initial:   Pain Intensity 1: 0  Pain Location 1: Knee  Pain Orientation 1: Right  Pain Intervention(s) 1: Ambulation/Increased Activity, Cold pack, Elevation, Repositioned  Post Session:  0      Assessment/Reassessment only, no treatment provided today        Date:    Date:    Date:      ACTIVITY/EXERCISE AM PM AM PM AM PM   GROUP THERAPY  [ ]  [ ]  [ ]  [ ]  [ ]  [ ]   Ankle Pumps               Quad Sets               Gluteal Sets               Hip ABd/ADduction               Straight Leg Raises               Knee Slides               Short Arc Quads               Long Arc Quads               Chair Slides                               B = bilateral; AA = active assistive; A = active; P = passive       Treatment/Session Assessment:         Response to Treatment:  Tolerated well. Returned to bed.      Education:  [ ] Home Exercises  [ ] Fall Precautions  [ ] Hip Precautions [ ] Going Home Video  [ ] Knee/Hip Prosthesis Review  [ ] Walker Management/Safety [ ] Adaptive Equipment as Needed         Interdisciplinary Collaboration:   · Physical Therapist  · Occupational Therapist  · Registered Nurse     After treatment position/precautions:   · Supine in bed  · Bed/Chair-wheels locked  · Bed in low position  · Call light within reach  · RN notified  · Side rails x 3     Compliance with Program/Exercises: Will assess as treatment progresses. Recommendations/Intent for next treatment session:  Treatment next visit will focus on increasing Mr. Jagdish Pereyra independence with bed mobility, transfers, gait training, strength/ROM exercises, modalities for pain, and patient education.        Total Treatment Duration:  PT Patient Time In/Time Out  Time In: 1430  Time Out: [place]

## 2017-02-23 NOTE — PERIOP NOTES
TRANSFER - OUT REPORT:    Verbal report given to Nimo Jacobs RN(name) on Isauro Don  being transferred to [facility](unit) for routine post - op       Report consisted of patients Situation, Background, Assessment and   Recommendations(SBAR). Information from the following report(s) SBAR, Kardex, OR Summary, Procedure Summary, Intake/Output and MAR was reviewed with the receiving nurse. Opportunity for questions and clarification was provided.       Patient transported with:   O2 @ 3 liters  Tech

## 2017-02-23 NOTE — ANESTHESIA POSTPROCEDURE EVALUATION
Post-Anesthesia Evaluation and Assessment    Patient: Dulce Ingram MRN: [id number]  SSN: [id number]    YOB: 1935  Age: 80 y.o. Sex: male       Cardiovascular Function/Vital Signs  Visit Vitals    /56    Pulse 80    Temp 37.2 °C (98.9 °F)    Resp 14    Ht 5' 10\" (1.778 m)    Wt 97.1 kg (214 lb)    SpO2 95%    BMI 30.71 kg/m2       Patient is status post spinal anesthesia for Procedure(s):  KNEE ARTHROPLASTY TOTAL/ RIGHT/ MEDACTA. Nausea/Vomiting: None    Postoperative hydration reviewed and adequate. Pain:  Pain Scale 1: Numeric (0 - 10) (02/23/17 1042)  Pain Intensity 1: 0 (02/23/17 1042)   Managed    Neurological Status:   Neuro (WDL): Exceptions to WDL (02/23/17 1012)  Neuro  Neurologic State: Sleeping (02/23/17 1042)  Cognition: Follows commands (02/23/17 1012)  Speech: Clear (02/23/17 1012)  LUE Motor Response: Purposeful (02/23/17 1012)  LLE Motor Response: Pharmacologically paralyzed (02/23/17 1012)  RUE Motor Response: Purposeful (02/23/17 1012)  RLE Motor Response: Pharmacologically paralyzed (02/23/17 1012)   At baseline    Mental Status and Level of Consciousness: Arousable    Pulmonary Status:   O2 Device: Nasal cannula (02/23/17 1027)   Adequate oxygenation and airway patent    Complications related to anesthesia: None    Post-anesthesia assessment completed.  No concerns    Signed By: Karen Barillas MD     February 23, 2017

## 2017-02-23 NOTE — PROGRESS NOTES
TRANSFER - IN REPORT:    Verbal report received from Nadia Sultana (name) on Kat Coil  being received from PACU (unit) for routine progression of care      Report consisted of patients Situation, Background, Assessment and   Recommendations(SBAR). Information from the following report(s) SBAR, Kardex, Intake/Output and Recent Results was reviewed with the receiving nurse. Opportunity for questions and clarification was provided. Assessment will be  completed upon patients arrival to unit and care assumed.

## 2017-02-23 NOTE — PERIOP NOTES
TRANSFER - IN REPORT:    Verbal report received from [facility] on Theron Abraham  being received from pre op for routine progression of care      Report consisted of patients Situation, Background, Assessment and   Recommendations(SBAR). Information from the following report(s) SBAR was reviewed with the receiving nurse. Opportunity for questions and clarification was provided. Assessment completed upon patients arrival to unit and care assumed.

## 2017-02-23 NOTE — ANESTHESIA PREPROCEDURE EVALUATION
Anesthetic History               Review of Systems / Medical History  Patient summary reviewed and pertinent labs reviewed    Pulmonary              Pertinent negatives: Sleep apnea: denies.      Neuro/Psych         TIA (2013)     Cardiovascular    Hypertension: well controlled  Valvular problems/murmurs (s/p AVR): aortic stenosis        CAD, PAD and hyperlipidemia    Exercise tolerance: <4 METS  Comments: EF 40-45%   GI/Hepatic/Renal     GERD           Endo/Other      Hypothyroidism  Arthritis     Other Findings   Comments: Depression  Diverticulosis  Spinal stenosis  LBP           Physical Exam    Airway  Mallampati: I  TM Distance: 4 - 6 cm    Mouth opening: Normal     Cardiovascular  Regular rate and rhythm,  S1 and S2 normal,  no murmur, click, rub, or gallop  Rhythm: regular  Rate: normal         Dental    Dentition: Implants     Pulmonary  Breath sounds clear to auscultation               Abdominal  GI exam deferred       Other Findings            Anesthetic Plan    ASA: 3  Anesthesia type: spinal            Anesthetic plan and risks discussed with: Patient

## 2017-02-23 NOTE — ANESTHESIA PROCEDURE NOTES
Spinal Block    Start time: 2/23/2017 8:15 AM  End time: 2/23/2017 8:21 AM  Performed by: Rhoda Lau  Authorized by: Rhoda Lau     Pre-procedure:   Indications: at surgeon's request and primary anesthetic  Preanesthetic Checklist: patient identified, risks and benefits discussed, anesthesia consent, patient being monitored and timeout performed    Timeout Time: 08:15          Spinal Block:   Patient Position:  Seated  Prep Region:  Lumbar  Prep: chlorhexidine      Location:  L2-3  Technique:  Single shot  Local:  Lidocaine 1%  Local Dose (mL):  5    Needle:   Needle Type:  Pencan  Needle Gauge:  25 G  Attempts:  1      Events: CSF confirmed, no blood with aspiration and no paresthesia        Assessment:  Insertion:  Uncomplicated  Patient tolerance:  Patient tolerated the procedure well with no immediate complications

## 2017-02-23 NOTE — OP NOTES
[facility]  Total Knee Arthroplasty  Patient:Klever Robertson   : 1935  Medical Record [id number]      Pre-operative Diagnosis:  Unilateral primary osteoarthritis, right knee [M17.11]  Post-operative Diagnosis: Unilateral primary osteoarthritis, right knee [M17.11]  Location: [place] [address]    Date of Procedure: 2017  Surgeon: Landon Lerma MD  Assistant: Prakash Edouard PA-C     Anesthesia: Spinal and  nerve block    Procedure:  Right Medial Pivot Total Knee Arthroplasty with use of Bone Cement    Tourniquet Time: none    EBL: less than 150cc     The complexity of the total joint surgery requires the use of a first assistant for positioning, retraction and assistance in closure. Robin Blas was brought to the operating room, positioned on the operating room table, and after appropriate identification  was anethestized. A rojas catheter was placed preoperatively and IV antibiotics were administered, along with IV transexamic acid. Prior to the incision being made a timeout was called identifying the patient, procedure ,operative side and surgeon. Prior the limb had been prepped and draped in the usual sterile manner. An anterior longitudinal incision was accomplished just medial to the tibial tubercle and extending approximal 6 centimeters proximal to the superior pole of the patella. A medial parapatellar capsular incision was performed. The medial capsular flap was elevated around to the insertion of the semimembranous tendon. The patella was everted and the knee flexed and externally rotated. The articular surface revealed cartilage loss with exposed bone and bone spurs throughout all three compartments. The medial and lateral menisci were excised. The lateral half of the fat pad excised and the patella femoral ligament was released. The anterior cruciate ligament and the posterior cruciate ligament were resected.   Using extramedullary instrumentation, the tibial cut was accomplished with appropriate posterior slope. Approximately 6 mm of bone was removed from the high side of the tibia. The distal femur was addressed next. A drill hole was made above the intracondylar notch. Using appropriate intramedullary instrumentation, an appropriate valgus distal cut was accomplished. The femur was sized to a 5 plus component. The anterior and posterior cuts and anterior and posterior chamfer cuts were then made about the distal femur. Osteophytes were removed from the tibial and femoral surfaces. The appropriate cutting blocks was then utilized to perform the notch cut, with appropriate lateral translation accomplished for the patellofemoral groove. The tibia was sized to a 5 component. The tibial base plate was pinned into place with  appropriate external rotation and the stem site prepared. A preliminary range of motion was accomplished with the above size trial components. A 13 millimeter polyethylene insert allowed the patient to obtain full extension as well as appropriate flexion. Additional surgical releases were limited lateral retinacular. .  The patient's ligaments were stable in flexion and extension to medial and lateral stressing and alignment was through the appropriate mechanical axis. The patella was then everted. The bone was resected to accomodate a size 3 patella button. A trial reduction revealed appropriate tracking through the patellofemoral groove with no lateral retinacular release accomplished. All trial components were removed and the surfaces were prepared for cementing with irrigation and debridement of the bone interstices. One package of cement  was mixed and the permanent components cemented into place. The femoral and tibial components were pressurized in full extension as well as 70 degrees of flexion. The patella component was pressurized using the patella clamp.   Excess cement was removed using a curette. Once the cement was hardened, the patella clamp was removed and the knee was copiously irrigated. A lavage of diluted betadine solution of 17.5 ml Betadine in 500 of 0.9% Normal Saline was allowed to soak in the wound for 3 minutes after implanting of the prosthesis. The wound was irrigated with Saline again before closure. Prior to the final skin closure, full strength betadine was applied to the skin surrounding the skin incision. Travon Plaster Joceline's knee was placed through a range of motion and noted to be stable as mentioned above with the trial components. The operative knee was injected prior to closure for post op pain management. The capsular layer was closed using a #1 PDS suture, while the subcutaneous layers were closed using a 2-0 Monocryl interrupted suture. The skin was closed using staples and a sterile bandage was applied. A cryo pad was applied on the operative leg. The sponge count and needle counts were correct. Implants:   Implant Name Type Inv.  Item Serial No.  Lot No. LRB No. Used   CEMENT BNE HV R 40GM -- [employer]AC[employer] - [id number]  CEMENT BNE HV R 40GM -- [employer]  [employer] [id number] Right 1   size 3 resurfacing patella    [employer] [place] [id number] Right 1   size 5 right 13mm flex tibial insert    [address] [id number] Right 1   5+ right cemented femoral component       [address] [id number] Right 1     Signed By: Alesia Summers MD

## 2017-02-23 NOTE — H&P
The patient has end stage arthritis of the right knee. The patient was see and examined in the office prior to today, there are no changes to the patient's conditions. They have tried conservative treatment for this condition; including lifestyle modifications and antiinflammatories and have failed. The necessity for the joint replacement is still present, and the H&P is still current. The patient will be admitted today for a right TKA.

## 2017-02-23 NOTE — PROGRESS NOTES
Patient resting comfortable in bed a/o x 4 denies any pain or nausea, not taking any pain medication since surgery. Family at bedside no needs at this time.

## 2017-02-24 NOTE — PROGRESS NOTES
Problem: Mobility Impaired (Adult and Pediatric)  Goal: *Acute Goals and Plan of Care (Insert Text)  GOALS (1-4 days):  (1.)Mr. Ming Robertson will move from supine to sit and sit to supine in bed with MINIMAL ASSIST. Met 2/24  (2.)Mr. Ming Robertson will transfer from bed to chair and chair to bed with MINIMAL ASSIST using the least restrictive device. Met 2/24  (3.)Mr. Ming Robertson will ambulate with MINIMAL ASSIST for 50 feet with the least restrictive device. Met 2/24  (4.)Mr. Ming Robertson will ambulate up/down 4 steps with bilateral railing with MINIMAL ASSIST with no device. Met 2/24  (5.)Mr. Ming Robertson will increase right knee ROM to 5°-80°. ADDENDUM GOALS 2/24/17 : bed mobility with supervision. 2) transfers with walker & SBA. Met 2/24                                                  3) pt ambulating 200 ft with SBA. Met/24  ________________________________________________________________________________________________      PHYSICAL THERAPY JOINT CAMP TKA: Daily Note, Treatment Day: 1st and PM 2/24/2017  INPATIENT: Hospital Day: 2  [place] / [place] / Product Type: Medicare /      NAME/AGE/GENDER: Robin Blas is a 80 y.o. male      PRIMARY DIAGNOSIS:  Unilateral primary osteoarthritis, right knee [M17.11]              Procedure(s) and Anesthesia Type:     * KNEE ARTHROPLASTY TOTAL/ RIGHT/ MEDACTA - Spinal (Right)  ICD-10: Treatment Diagnosis:        · Pain in Right Knee (M25.561)  · Stiffness of Right Knee, Not elsewhere classified (M25.661)  · Difficulty in walking, Not elsewhere classified (R26.2)  · Other abnormalities of gait and mobility (R26.89)       ASSESSMENT:      Mr. Ming Robertson showed increased gait distance & improved level of assist for bed mobility, tranfers & gait in am session.  In pm session pt continued to show steady gains with functional mobility & with tolerance to exercises    This section established at most recent assessment PROBLEM LIST (Impairments causing functional limitations):  1. Decreased Strength  2. Decreased Transfer Abilities  3. Decreased Ambulation Ability/Technique  4. Decreased Balance  5. Increased Pain  6. Decreased Activity Tolerance  7. Increased Fatigue  8. Decreased Flexibility/Joint Mobility  9. Decreased Knowledge of Precautions  10. Decreased [place] with Home Exercise Program    INTERVENTIONS PLANNED: (Benefits and precautions of physical therapy have been discussed with the patient.)  1. Balance Exercise  2. Bed Mobility  3. Cold  4. Gait Training  5. Home Exercise Program (HEP)  6. Therapeutic Exercise/Strengthening  7. Transfer Training  8. TKA education  9. Range of Motion: active/assisted/passive  10. Therapeutic Activities  11. Group Therapy      TREATMENT PLAN: Frequency/Duration: Follow patient BID   to address above goals. Rehabilitation Potential For Stated Goals: FAIR      RECOMMENDED REHABILITATION/EQUIPMENT: (at time of discharge pending progress): Continue Skilled Therapy and Home Health: Physical Therapy. HISTORY:   History of Present Injury/Illness (Reason for Referral):  S/P R TKA  Past Medical History/Comorbidities:   Mr. Chantale Souza  has a past medical history of Aortic valve stenosis, nonrheumatic (4/25/2016); Arrhythmia; Arthritis; CAD (coronary artery disease); Cor pulmonale ([address] Cyst near tailbone; DDD (degenerative disc disease), lumbar; Depression; Diverticulosis; Edema (04/25/2016); Former cigarette smoker; Genital edema, male (10/31/2016); GERD (gastroesophageal reflux disease); Heart murmur; Hepatitis B; High cholesterol; History of colon polyps (5/13/2013); History of hepatitis A; History of staph infection; Hypertension; Long term (current) use of anticoagulants; Lumbago; Morbid obesity ([address] SOB (shortness of breath); Spinal stenosis; Thyroid disease; TIA (transient ischemic attack) (5/13/2013);  Transient ischemic attack (TIA) (2002); and Unspecified sleep apnea (05/21/2013). He also has no past medical history of Aneurysm ([facility] Asthma; Autoimmune disease ([facility] Cancer ([facility] [facility] Chronic kidney disease; Chronic pain; Coagulation disorder ([facility] [address] [facility] Diabetes ([facility] [address] [facility] Difficult intubation; Endocarditis; Heart failure ([facility] [facility] Malignant hyperthermia due to anesthesia; Nausea & vomiting; Pseudocholinesterase deficiency; PUD (peptic ulcer disease); Rheumatic fever; Seizures ([facility] [address] [facility] or Thromboembolus ([facility] [address] Mr. Fox Zayas  has a past surgical history that includes cardiac surg procedure unlist (10/2016); orthopaedic; hernia repair (Left); heart catheterization; colonoscopy; carotid endarterectomy (Left, 2002); back surgery; and heart valve surgery. Social History/Living Environment:   Home Environment: Private residence  # Steps to Enter: 2  Hand Rails : Bilateral  One/Two Story Residence: Two story  # of Interior Steps: 14  Height of Each Step (in): 8 inches  Interior Rails: Left  Lift Chair Available: Yes  Living Alone: Yes  Support Systems: Child(gali), Family member(s)  Patient Expects to be Discharged to[de-identified] Private residence  Current DME Used/Available at Home: Walker, rolling, Wheelchair, Cane, straight, Tub transfer bench  Tub or Shower Type: Tub/Shower combination  Prior Level of Function/Work/Activity:  Lives alone, can bathe himself, but uses a wc in the home, pt was using a rolling walker 2 weeks prior to surgery   Number of Personal Factors/Comorbidities that affect the Plan of Care: 1-2: MODERATE COMPLEXITY   EXAMINATION:   Most Recent Physical Functioning:                 RLE PROM  R Knee Flexion: 50 (~ post op)  R Knee Extension: -20 (~post op)             Bed Mobility  Supine to Sit:  (NT)  Sit to Supine: Stand-by asssistance  Scooting: Stand-by asssistance     Transfers  Sit to Stand: Stand-by asssistance  Stand to Sit: Stand-by asssistance  Bed to Chair: Stand-by asssistance (with walker)     Balance  Sitting: Intact; Without support  Standing: Impaired; With support (walker)                Weight Bearing Status  Right Side Weight Bearing: As tolerated  Distance (ft): 220 Feet (ft)  Ambulation - Level of Assistance: Stand-by asssistance  Assistive Device: Walker, rolling  Speed/[place]: Delayed  Step Length: Left shortened  Stance: Right decreased  Gait Abnormalities: Antalgic;Decreased step clearance  Number of Stairs Trained: 3  Stairs - Level of Assistance: Contact guard assistance  Rail Use: Both  Interventions: Safety awareness training;Verbal cues      Braces/Orthotics:      Right Knee Cold  Type: Cryocuff       Body Structures Involved:  1. Joints  2. Muscles Body Functions Affected:  1. Neuromusculoskeletal  2. Movement Related Activities and Participation Affected:  1. Mobility  2. Self Care   Number of elements that affect the Plan of Care: 4+: HIGH COMPLEXITY   CLINICAL PRESENTATION:   Presentation: Stable and uncomplicated: LOW COMPLEXITY   CLINICAL DECISION MAKIN[address] [place] [address] AM-PAC 6 Clicks   Basic Mobility Inpatient Short Form  How much difficulty does the patient currently have. .. Unable A Lot A Little None   1. Turning over in bed (including adjusting bedclothes, sheets and blankets)? [ ] 1   [X] 2   [ ] 3   [ ] 4   2. Sitting down on and standing up from a chair with arms ( e.g., wheelchair, bedside commode, etc.)   [ ] 1   [X] 2   [ ] 3   [ ] 4   3. Moving from lying on back to sitting on the side of the bed? [ ] 1   [X] 2   [ ] 3   [ ] 4   How much help from another person does the patient currently need. .. Total A Lot A Little None   4. Moving to and from a bed to a chair (including a wheelchair)? [ ] 1   [X] 2   [ ] 3   [ ] 4   5. Need to walk in hospital room? [X] 1   [ ] 2   [ ] 3   [ ] 4   6. Climbing 3-5 steps with a railing? [X] 1   [ ] 2   [ ] 3   [ ] 4   © , Trustees of [address] [place] [address], under license to [employer].  All rights reserved       Score:  Initial: 10 Most Recent: X (Date: -- )     Interpretation of Tool: Represents activities that are increasingly more difficult (i.e. Bed mobility, Transfers, Gait). Score 24 23 22-20 19-15 14-10 9-7 6       Modifier CH CI CJ CK CL CM CN         · Mobility - Walking and Moving Around:               - CURRENT STATUS:    CL - 60%-79% impaired, limited or restricted               - GOAL STATUS:           CK - 40%-59% impaired, limited or restricted               - D/C STATUS:                       ---------------To be determined---------------  [place] / [place] / Product Type: Medicare /       Medical Necessity:     · Patient demonstrates fair rehab potential due to higher previous functional level. Reason for Services/Other Comments:  · Patient continues to require present interventions due to patient's inability to perform functional mobility. Use of outcome tool(s) and clinical judgement create a POC that gives a: Questionable prediction of patient's progress: MODERATE COMPLEXITY                 TREATMENT:   (In addition to Assessment/Re-Assessment sessions the following treatments were rendered)      Pre-treatment Symptoms/Complaints:  Pt pleased with his progress  Pain: Initial: visual scale  Pain Intensity 1: 2  Pain Location 1: Knee  Pain Orientation 1: Right  Pain Intervention(s) 1: Cold pack, Exercise  Post Session:  2/10      Therapeutic Exercise: (12 Minutes):  Exercises per grid below to improve mobility and dynamic movement of leg - right to improve functional endurance. Required minimal verbal cues to promote proper body alignment and promote proper body mechanics. Progressed range and repetitions as indicated. Gait Training (11 Minutes):  Gait training to improve and/or restore physical functioning as related to mobility, strength, balance, coordination and dynamic movement of leg - right to improve functional gait.   Ambulated 220 Feet (ft) with Stand-by asssistance using a Walker, rolling and minimal Safety awareness training;Verbal cues related to their stride length and heel strike to promote proper body alignment, promote proper body posture and promote proper body mechanics. Date:   2/24 Date:    Date:      ACTIVITY/EXERCISE AM PM AM PM AM PM   GROUP THERAPY  [ ]  [ ]  [ ]  [ ]  [ ]  [ ]   Ankle Pumps  15  15           Quad Sets  15  15           Gluteal Sets  15  15           Hip ABd/ADduction  15  15           Straight Leg Raises  15  15           Knee Slides  15  15           Short Arc Quads  15  15           Long Arc Quads    --           Chair Slides    15                           B = bilateral; AA = active assistive; A = active; P = passive       Treatment/Session Assessment:         Response to Treatment:  Tolerated very well     Education:  [ x] Home Exercises  [x ] Fall Precautions  [ ] Hip Precautions [ ] Going Home Video  [ ] Knee/Hip Prosthesis Review  [ x] Walker Management/Safety [ ] Adaptive Equipment as Needed         Interdisciplinary Collaboration:   · Registered Nurse     After treatment position/precautions:   · Supine in bed, Bed/Chair-wheels locked, Bed in low position, Call light within reach and RN notified     Compliance with Program/Exercises: Will assess as treatment progresses. Recommendations/Intent for next treatment session:  Treatment next visit will focus on increasing Mr. Clif Bazzi independence with bed mobility, transfers, gait training, strength/ROM exercises, modalities for pain, and patient education.        Total Treatment Duration:  PT Patient Time In/Time Out  Time In: 1440  Time Out: [address], PT

## 2017-02-24 NOTE — DISCHARGE INSTRUCTIONS
[address] [facility]   Patient Discharge Instructions    William Nicholson / [id number] : 1935    Admitted 2017 Discharged: 2017     IF YOU HAVE ANY PROBLEMS ONCE YOU ARE AT HOME CALL THE FOLLOWING NUMBERS:   Main office number: [phone]    Take Home Medications     · It is important that you take the medication exactly as they are prescribed. · Keep your medication in the bottles provided by the pharmacist and keep a list of the medication names, dosages, and times to be taken in your wallet. · Do not take other medications without consulting your doctor. What to do at [address] [place], [place] [address]e Ave your prehospital diet. If you have excessive nausea or vomitting call your doctor's office     Home Physical Therapy is arranged. Use rolling walker when walking. Use Jayesh Hose stockings until we see you in the office for your follow up appointment with Dr. Svetlana Newell    Patients who have had a joint replacement should not drive until you are seen for your follow up appointment by Dr. Svetlana Newell. When to Call    - Call if you have a temperature greater then 101  - Unable to keep food down  - Loose control of your bladder or bowel function  - Are unable to bear any weight   - Need a pain medication refill       DISCHARGE SUMMARY from Nurse    The following personal items collected during your admission are returned to you:   Dental Appliance: Dental Appliances: Lowers, Uppers, With patient  Vision: Visual Aid: Glasses  Hearing Aid:   na  Jewelry: Jewelry: Ring, Watch  Clothing: Clothing: At bedside  Other Valuables:  Other Valuables: None  Valuables sent to safe: Personal Items Sent to Safe: n/a    PATIENT INSTRUCTIONS:    After general anesthesia or intravenous sedation, for 24 hours or while taking prescription Narcotics:  · Limit your activities  · Do not drive and operate hazardous machinery  · Do not make important personal or business decisions  · Do  not drink alcoholic beverages  · If you have not urinated within 8 hours after discharge, please contact your surgeon on call. Report the following to your surgeon:  · Excessive pain, swelling, redness or odor of or around the surgical area  · Temperature over 101  · Nausea and vomiting lasting longer than 4 hours or if unable to take medications  · Any signs of decreased circulation or nerve impairment to extremity: change in color, persistent  numbness, tingling, coldness or increase pain  · Any questions, call office @ [phone]      Keep scheduled follow up appointment. If need to change, call office @ [phone]. Total Knee Replacement: What to Expect at [address] [place] [address]    When you leave the hospital, you should be able to move around with a walker or crutches. But you will need someone to help you at home for the next few weeks or until you have more energy and can move around better. If there is no one to help you at home, you may go to a rehabilitation center. You will go home with a bandage and stitches or staples. Change the bandage as your doctor tells you to. Your doctor will remove your stitches or staples 10 to 21 days after your surgery. You may still have some mild pain, and the area may be swollen for 3 to 6 months after surgery. Your knee will continue to improve for 6 to 12 months. You will probably use a walker for 1 to 3 weeks and then use crutches. When you are ready, you can use a cane. You will probably be able to walk on your own in 4 to 8 weeks. You will need to do months of physical rehabilitation (rehab) after a knee replacement. Rehab will help you strengthen the muscles of the knee and help you regain movement. After you recover, your artificial knee will allow you to do normal daily activities with less pain or no pain at all. You may be able to hike, dance, ride a bike, and play golf. Talk to your doctor about whether you can do more strenuous activities.  Always tell your caregivers that you have an artificial knee.  How long it will take to walk on your own, return to normal activities, and go back to work depends on your health and how well your rehabilitation (rehab) program goes. The better you do with your rehab exercises, the quicker you will get your strength and movement back. This care sheet gives you a general idea about how long it will take for you to recover. But each person recovers at a different pace. Follow the steps below to get better as quickly as possible. How can you care for yourself at home? Activity  · Rest when you feel tired. You may take a nap, but do not stay in bed all day. When you sit, use a chair with arms. You can use the arms to help you stand up. · Work with your physical therapist to find the best way to exercise. You may be able to take frequent, short walks using crutches or a walker. What you can do as your knee heals will depend on whether your new knee is cemented or uncemented. You may not be able to do certain things for a while if your new knee is uncemented. · After your knee has healed enough, you can do more strenuous activities with caution. ¨ You can golf, but use a golf cart, and do not wear shoes with spikes. ¨ You can bike on a flat road or on a stationary bike. Avoid biking up hills. ¨ Your doctor may suggest that you stay away from activities that put stress on your knee. These include tennis or badminton, squash or racquetball, contact sports like football, jumping (such as in basketball), jogging, or running. ¨ Avoid activities where you might fall. These include horseback riding, skiing, and mountain biking. · Do not sit for more than 1 hour at a time. Get up and walk around for a while before you sit again. If you must sit for a long time, prop up your leg with a chair or footstool. This will help you avoid swelling. · Ask your doctor when you can shower. You may need to take sponge baths until your stitches or staples have been removed.   · Ask your doctor when you can drive again. It may take up to 8 weeks after knee replacement surgery before it is safe for you to drive. · When you get into a car, sit on the edge of the seat. Then pull in your legs, and turn to face the front. · You should be able to do many everyday activities 3 to 6 weeks after your surgery. You will probably need to take 4 to 16 weeks off from work. When you can go back to work depends on the type of work you do and how you feel. · Ask your doctor when it is okay for you to have sex. · Do not lift anything heavier than 10 pounds and do not lift weights for 12 weeks. Diet  · By the time you leave the hospital, you should be eating your normal diet. If your stomach is upset, try bland, low-fat foods like plain rice, broiled chicken, toast, and yogurt. Your doctor may suggest that you take iron and vitamin supplements. · Drink plenty of fluids (unless your doctor tells you not to). · Eat healthy foods, and watch your portion sizes. Try to stay at your ideal weight. Too much weight puts more stress on your new knee. · You may notice that your bowel movements are not regular right after your surgery. This is common. Try to avoid constipation and straining with bowel movements. You may want to take a fiber supplement every day. If you have not had a bowel movement after a couple of days, ask your doctor about taking a mild laxative. Medicines  · Your doctor will tell you if and when you can restart your medicines. He or she will also give you instructions about taking any new medicines. · If you take blood thinners, such as warfarin (Coumadin), clopidogrel (Plavix), or aspirin, be sure to talk to your doctor. He or she will tell you if and when to start taking those medicines again. Make sure that you understand exactly what your doctor wants you to do. · Your doctor may give you a blood-thinning medicine to prevent blood clots.  If you take a blood thinner, be sure you get instructions about how to take your medicine safely. Blood thinners can cause serious bleeding problems. This medicine could be in pill form or as a shot (injection). If a shot is necessary, your doctor will tell you how to do this. · Be safe with medicines. Take pain medicines exactly as directed. ¨ If the doctor gave you a prescription medicine for pain, take it as prescribed. ¨ If you are not taking a prescription pain medicine, ask your doctor if you can take an over-the-counter medicine. ¨ Plan to take your pain medicine 30 minutes before exercises. It is easier to prevent pain before it starts than to stop it once it has started. · If you think your pain medicine is making you sick to your stomach:  ¨ Take your medicine after meals (unless your doctor has told you not to). ¨ Ask your doctor for a different pain medicine. · If your doctor prescribed antibiotics, take them as directed. Do not stop taking them just because you feel better. You need to take the full course of antibiotics. Incision care  · You will have a bandage over the cut (incision) and staples or stitches. Take the bandage off when your doctor says it is okay. · Your doctor will remove the staples or stitches 10 days to 3 weeks after the surgery and replace them with strips of tape. Leave the tape on for a week or until it falls off. Exercise  · Your rehab program will give you a number of exercises to do to help you get back your knee's range of motion and strength. Always do them as your therapist tells you. Ice and elevation  · For pain and swelling, put ice or a cold pack on the area for 10 to 20 minutes at a time. Put a thin cloth between the ice and your skin. Other instructions  · Continue to wear your support stockings as your doctor says. These help to prevent blood clots. The length of time that you will have to wear them depends on your activity level and the amount of swelling.   · Wear medical alert jewelry that says you may need antibiotics before any procedure, including dental work. You can buy this at most drugstores. · You have metal pieces in your knee. These may set off some airport metal detectors. Carry a medical alert card that says you have an artificial joint, just in case. Follow-up care is a key part of your treatment and safety. Be sure to make and go to all appointments, and call your doctor if you are having problems. It's also a good idea to know your test results and keep a list of the medicines you take. When should you call for help? Call 911 anytime you think you may need emergency care. For example, call if:  · You passed out (lost consciousness). · You have severe trouble breathing. · You have sudden chest pain and shortness of breath, or you cough up blood. Call your doctor now or seek immediate medical care if:  · You have signs of infection, such as:  ¨ Increased pain, swelling, warmth, or redness. ¨ Red streaks leading from the incision. ¨ Pus draining from the incision. ¨ A fever. · You have signs of a blood clot, such as:  ¨ Pain in your calf, back of the knee, thigh, or groin. ¨ Redness and swelling in your leg or groin. · Your incision comes open and begins to bleed, or the bleeding increases. · You have pain that does not get better after you take pain medicine. Watch closely for changes in your health, and be sure to contact your doctor if:  · You do not have a bowel movement after taking a laxative. Where can you learn more? Go to http://ligia-александр.info/. Enter D660 in the search box to learn more about \"Total Knee Replacement: What to Expect at Home. \"  Current as of: August 4, 2016  Content Version: 11.1  © 5661-6460 [employer]. Care instructions adapted under license by [employer] (which disclaims liability or warranty for this information).  If you have questions about a medical condition or this instruction, always ask your healthcare professional. [address] any warranty or liability for your use of this information. *  Please give a list of your current medications to your Primary Care Provider. *  Please update this list whenever your medications are discontinued, doses are      changed, or new medications (including over-the-counter products) are added. *  Please carry medication information at all times in case of emergency situations. These are general instructions for a healthy lifestyle:    No smoking/ No tobacco products/ Avoid exposure to second hand smoke    Surgeon General's Warning:  Quitting smoking now greatly reduces serious risk to your health. Obesity, smoking, and sedentary lifestyle greatly increases your risk for illness    A healthy diet, regular physical exercise & weight monitoring are important for maintaining a healthy lifestyle    You may be retaining fluid if you have a history of heart failure or if you experience any of the following symptoms:  Weight gain of 3 pounds or more overnight or 5 pounds in a week, increased swelling in our hands or feet or shortness of breath while lying flat in bed. Please call your doctor as soon as you notice any of these symptoms; do not wait until your next office visit. Recognize signs and symptoms of STROKE:    F-face looks uneven    A-arms unable to move or move even    S-speech slurred or non-existent    T-time-call 911 as soon as signs and symptoms begin-DO NOT go       Back to bed or wait to see if you get better-TIME IS BRAIN. The discharge information has been reviewed with the patient. The patient verbalized understanding. Information obtained by :  I understand that if any problems occur once I am at home I am to contact my physician. I understand and acknowledge receipt of the instructions indicated above. Physician's or R.N.'s Signature                                                                  Date/Time                                                                                                                                              Patient or Representative Signature                                                          Date/Time

## 2017-02-24 NOTE — PROGRESS NOTES
[address]  Face to Face Encounter    Patients Name: Wandy Avila    YOB: 1935    Ordering Physician: Jerson Sherman    Primary Diagnosis: Unilateral primary osteoarthritis, right knee [M17.11]  S/p right TKA    Date of Face to Face:   2-23-17                                Face to Face Encounter findings are related to primary reason for home care:   yes. 1. I certify that the patient needs intermittent care as follows: physical therapy: gait/stair training    2. I certify that this patient is homebound, that is: 1) patient requires the use of a walker device, special transportation, or assistance of another to leave the home; or 2) patient's condition makes leaving the home medically contraindicated; and 3) patient has a normal inability to leave the home and leaving the home requires considerable and taxing effort. Patient may leave the home for infrequent and short duration for medical reasons, and occasional absences for non-medical reasons. Homebound status is due to the following functional limitations: Patient's ambulation limited secondary to severe pain and requires the use of an assistive device and the assistance of a caregiver for safe completion. Patient with strength and ROM deficits limiting ambulation endurance requiring the use of an assistive device and the assistance of a caregiver. Patient deemed temporarily homebound secondary to increased risk for infection when leaving home and going out into the community. 3. I certify that this patient is under my care and that I, or a nurse practitioner or  [id number], or clinical nurse specialist, or certified nurse midwife, working with me, had a Face-to-Face Encounter that meets the physician Face-to-Face Encounter requirements.   The following are the clinical findings from the [address] [place] [address] encounter that support the need for skilled services and is a summary of the encounter: see hospital chart      Berneda Clause Clarke Ask, [address]  2/24/2017      THE FOLLOWING TO BE COMPLETED BY THE COMMUNITY PHYSICIAN:    I concur with the findings described above from the F2F encounter that this patient is homebound and in need of a skilled service.     Certifying Physician: _____________________________________      Printed Certifying Physician Name: _____________________________________    Date: _________________

## 2017-02-24 NOTE — PROGRESS NOTES
Care Management Interventions  Mode of Transport at Discharge: Self  Transition of Care Consult (CM Consult): [address]: Yes  Discharge Durable Medical Equipment: Yes  Physical Therapy Consult: Yes  Occupational Therapy Consult: Yes  Current Support Network: Lives with Spouse  Confirm Follow Up Transport: Family  Plan discussed with Pt/Family/Caregiver: Yes  Freedom of Choice Offered: Yes  Discharge Location  Discharge Placement: Home with home health    Patient is a 80y.o. year old male admitted for Right TKA . Patient lives with His spouse and plans to return home on discharge. Order received to arrange home health. Patient without preference towards agency. Referral sent to [facility]. Patient denies any equipment needs as he has a walker and declined need for a BSC. Will follow until discharge.   Mildred Olivares

## 2017-02-24 NOTE — PROGRESS NOTES
Pt resting quietly in the bed watching TV. Denies pain. Dressing to right knee clean dry and intact,  NV status WNL's  Dorsiflexing bilateral LE's well. Bed i n low locked position and call light within reach  Reminded pt how to jp the call light and encouraged to call as needed. No noted distress.

## 2017-02-24 NOTE — PROGRESS NOTES
2017         Post Op day: 1 Day Post-Op     Admit Date: 2017  Admit Diagnosis: Unilateral primary osteoarthritis, right knee [M17.11]        Subjective: Doing well, No complaints, No SOB, No Chest Pain, No Nausea or Vomiting     Objective:   Vital Signs are Stable, No Acute Distress, Alert and Oriented, Dressing is Dry,  Neurovascular exam is normal.     Assessment / Plan :  Patient Active Problem List   Diagnosis Code    DDD (degenerative disc disease), lumbar M51.36    COPD (chronic obstructive pulmonary disease) ([facility] [address] J44.9    Other peripheral vascular disease(443.89)     BPH (benign prostatic hypertrophy) N40.0    Depression F32.9    History of colon polyps Z86.010    Other and unspecified hyperlipidemia E78.5    Unspecified sleep apnea G47.30    Aortic valve stenosis, nonrheumatic I35.0    HTN (hypertension), benign I10    Mitral valve regurgitation I34.0    Impotence due to erectile dysfunction N52.9    Acquired hypothyroidism E03.9    Hyperlipidemia E78.5    GERD (gastroesophageal reflux disease) K21.9    Diverticulosis K57.90    Sciatica M54.30    Carotid artery stenosis without cerebral infarction I65.29    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    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    S/P AVR Z95.2    Hypoxemia R09.02    History of tobacco abuse Z87.891    Postoperative anemia due to acute blood loss D62    Genital edema, male N48.80    Debility, unspecified R53.81    Acute encephalopathy G93.40    High cholesterol E78.00    Arthritis M19.90    Pneumonia J18.9    Sepsis ([facility] A41.9    Diarrhea R19.7    Syncope and collapse R55    Arthritis of knee, right M19.90    S/P total knee arthroplasty Z96.659    Patient Vitals for the past 8 hrs:   BP Temp Pulse Resp SpO2   17 0757 139/65 96.2 °F (35.7 °C) 67 16 96 %   17 0447 106/73 96.3 °F (35.7 °C) 71 16 97 %    Temp (24hrs), Av.8 °F (36 °C), Min:96.2 °F (35.7 °C), Max:98.9 °F (37.2 °C)    Body mass index is 30.71 kg/(m^2).     Continue PT  Lab Results   Component Value Date/Time    HGB 10.5 02/24/2017 05:05 AM    Monitor HGB   Pt seen by and discussed with Supervising Physician   Home this afternoon or tomorrow     Signed By: JOHN Mike

## 2017-02-24 NOTE — PROGRESS NOTES
Problem: Mobility Impaired (Adult and Pediatric)  Goal: *Acute Goals and Plan of Care (Insert Text)  GOALS (1-4 days):  (1.)Mr. Rola Evangelista will move from supine to sit and sit to supine in bed with MINIMAL ASSIST. Met 2/24  (2.)Mr. Rola Evangelista will transfer from bed to chair and chair to bed with MINIMAL ASSIST using the least restrictive device. Met 2/24  (3.)Mr. Rola Evangelista will ambulate with MINIMAL ASSIST for 50 feet with the least restrictive device. Met 2/24  (4.)Mr. Rola Evangelista will ambulate up/down 4 steps with bilateral railing with MINIMAL ASSIST with no device. (5.)Mr. Rola Evangelista will increase right knee ROM to 5°-80°. ADDENDUM GOALS 2/24/17 : bed mobility with supervision. 2) transfers with walker & SBA.                                                  3) pt ambulating 200 ft with SBA.  ________________________________________________________________________________________________      PHYSICAL THERAPY JOINT CAMP TKA: Daily Note, Treatment Day: 1st and AM 2/24/2017  INPATIENT: Hospital Day: 2  [place] / Product Type: Medicare /      NAME/AGE/GENDER: Elsie Camarillo is a 80 y.o. male      PRIMARY DIAGNOSIS:  Unilateral primary osteoarthritis, right knee [M17.11]              Procedure(s) and Anesthesia Type:     * KNEE ARTHROPLASTY TOTAL/ RIGHT/ MEDACTA - Spinal (Right)  ICD-10: Treatment Diagnosis:        · Pain in Right Knee (M25.561)  · Stiffness of Right Knee, Not elsewhere classified (M25.661)  · Difficulty in walking, Not elsewhere classified (R26.2)  · Other abnormalities of gait and mobility (R26.89)       ASSESSMENT:      Mr. Rola Evangelista showed increased gait distance & improved level of assist for bed mobility, tranfers & gait in am session    This section established at most recent assessment   PROBLEM LIST (Impairments causing functional limitations):  1. Decreased Strength  2. Decreased Transfer Abilities  3.  Decreased Ambulation Ability/Technique  4. Decreased Balance  5. Increased Pain  6. Decreased Activity Tolerance  7. Increased Fatigue  8. Decreased Flexibility/Joint Mobility  9. Decreased Knowledge of Precautions  10. Decreased [place] with Home Exercise Program    INTERVENTIONS PLANNED: (Benefits and precautions of physical therapy have been discussed with the patient.)  1. Balance Exercise  2. Bed Mobility  3. Cold  4. Gait Training  5. Home Exercise Program (HEP)  6. Therapeutic Exercise/Strengthening  7. Transfer Training  8. TKA education  9. Range of Motion: active/assisted/passive  10. Therapeutic Activities  11. Group Therapy      TREATMENT PLAN: Frequency/Duration: Follow patient BID   to address above goals. Rehabilitation Potential For Stated Goals: FAIR      RECOMMENDED REHABILITATION/EQUIPMENT: (at time of discharge pending progress): Continue Skilled Therapy and Home Health: Physical Therapy. HISTORY:   History of Present Injury/Illness (Reason for Referral):  S/P R TKA  Past Medical History/Comorbidities:   Mr. Srinivas Wood  has a past medical history of Aortic valve stenosis, nonrheumatic (4/25/2016); Arrhythmia; Arthritis; CAD (coronary artery disease); Cor pulmonale ([address] Cyst near tailbone; DDD (degenerative disc disease), lumbar; Depression; Diverticulosis; Edema (04/25/2016); Former cigarette smoker; Genital edema, male (10/31/2016); GERD (gastroesophageal reflux disease); Heart murmur; Hepatitis B; High cholesterol; History of colon polyps (5/13/2013); History of hepatitis A; History of staph infection; Hypertension; Long term (current) use of anticoagulants; Lumbago; Morbid obesity ([address] SOB (shortness of breath); Spinal stenosis; Thyroid disease; TIA (transient ischemic attack) (5/13/2013); Transient ischemic attack (TIA) (2002); and Unspecified sleep apnea (05/21/2013). He also has no past medical history of Aneurysm ([address] Asthma; Autoimmune disease ([address] Cancer ([address]  Chronic kidney disease; Chronic pain; Coagulation disorder ([facility] Diabetes ([address] Difficult intubation; Endocarditis; Heart failure ([address] Malignant hyperthermia due to anesthesia; Nausea & vomiting; Pseudocholinesterase deficiency; PUD (peptic ulcer disease); Rheumatic fever; Seizures ([address] or Thromboembolus ([address] Mr. Omar Ballard  has a past surgical history that includes cardiac surg procedure unlist (10/2016); orthopaedic; hernia repair (Left); heart catheterization; colonoscopy; carotid endarterectomy (Left, 2002); back surgery; and heart valve surgery. Social History/Living Environment:   Home Environment: Private residence  # Steps to Enter: 2  Hand Rails : Bilateral  One/Two Story Residence: Two story  # of Interior Steps: 14  Height of Each Step (in): 8 inches  Interior Rails: Left  Lift Chair Available: Yes  Living Alone: Yes  Support Systems: Child(gali), Family member(s)  Patient Expects to be Discharged to[de-identified] Private residence  Current DME Used/Available at Home: Walker, rolling, Wheelchair, Cane, straight, Tub transfer bench  Tub or Shower Type: Tub/Shower combination  Prior Level of Function/Work/Activity:  Lives alone, can bathe himself, but uses a wc in the home, pt was using a rolling walker 2 weeks prior to surgery   Number of Personal Factors/Comorbidities that affect the Plan of Care: 1-2: MODERATE COMPLEXITY   EXAMINATION:   Most Recent Physical Functioning:                 RLE PROM  R Knee Flexion: 50 (~ post op)  R Knee Extension: -20 (~post op)             Bed Mobility  Supine to Sit: Stand-by asssistance  Sit to Supine:  (NT)  Scooting: Stand-by asssistance     Transfers  Sit to Stand: Contact guard assistance  Stand to Sit: Contact guard assistance  Bed to Chair: Contact guard assistance (with walker)     Balance  Sitting: Intact; Without support  Standing: Impaired; With support (with walker)                Weight Bearing Status  Right Side Weight Bearing: As tolerated  Distance (ft): 80 Feet (ft)  Ambulation - Level of Assistance: Contact guard assistance  Assistive Device: Walker, rolling  Speed/[place]: Delayed  Step Length: Left shortened  Stance: Right decreased  Gait Abnormalities: Antalgic;Decreased step clearance  Interventions: Safety awareness training;Verbal cues      Braces/Orthotics:      Right Knee Cold  Type: Cryocuff       Body Structures Involved:  1. Joints  2. Muscles Body Functions Affected:  1. Neuromusculoskeletal  2. Movement Related Activities and Participation Affected:  1. Mobility  2. Self Care   Number of elements that affect the Plan of Care: 4+: HIGH COMPLEXITY   CLINICAL PRESENTATION:   Presentation: Stable and uncomplicated: LOW COMPLEXITY   CLINICAL DECISION MAKIN[address] [place] [address] AM-PAC 6 Clicks   Basic Mobility Inpatient Short Form  How much difficulty does the patient currently have. .. Unable A Lot A Little None   1. Turning over in bed (including adjusting bedclothes, sheets and blankets)? [ ] 1   [X] 2   [ ] 3   [ ] 4   2. Sitting down on and standing up from a chair with arms ( e.g., wheelchair, bedside commode, etc.)   [ ] 1   [X] 2   [ ] 3   [ ] 4   3. Moving from lying on back to sitting on the side of the bed? [ ] 1   [X] 2   [ ] 3   [ ] 4   How much help from another person does the patient currently need. .. Total A Lot A Little None   4. Moving to and from a bed to a chair (including a wheelchair)? [ ] 1   [X] 2   [ ] 3   [ ] 4   5. Need to walk in hospital room? [X] 1   [ ] 2   [ ] 3   [ ] 4   6. Climbing 3-5 steps with a railing? [X] 1   [ ] 2   [ ] 3   [ ] 4   © 2007, Trustees of [address] [place] [address], under license to [employer]. All rights reserved       Score:  Initial: 10 Most Recent: X (Date: -- )     Interpretation of Tool:  Represents activities that are increasingly more difficult (i.e. Bed mobility, Transfers, Gait).        Score 24 23 22-20 19-15 14-10 9-7 6       Modifier CH CI CJ CK CL CM CN         · Mobility - Walking and Moving Around:               - CURRENT STATUS:    CL - 60%-79% impaired, limited or restricted               - GOAL STATUS:           CK - 40%-59% impaired, limited or restricted               - D/C STATUS:                       ---------------To be determined---------------  [place] / [place] / Product Type: Medicare /       Medical Necessity:     · Patient demonstrates fair rehab potential due to higher previous functional level. Reason for Services/Other Comments:  · Patient continues to require present interventions due to patient's inability to perform functional mobility. Use of outcome tool(s) and clinical judgement create a POC that gives a: Questionable prediction of patient's progress: MODERATE COMPLEXITY                 TREATMENT:   (In addition to Assessment/Re-Assessment sessions the following treatments were rendered)      Pre-treatment Symptoms/Complaints:  No complaints  Pain: Initial: visual scale  Pain Intensity 1: 2  Pain Location 1: Knee  Pain Orientation 1: Right  Pain Intervention(s) 1: Cold pack, Exercise  Post Session:  2/10      Therapeutic Activity: (  11 Minutes (extra time to work through activity noted) ):  Therapeutic activities including bed mobility, transfers & gait short distances to improve WB potential.  Required minimal Safety awareness training;Verbal cues to promote safe technique. Therapeutic Exercise: (12 Minutes):  Exercises per grid below to improve mobility and dynamic movement of leg - right to improve functional endurance. Required minimal verbal cues to promote proper body alignment and promote proper body mechanics. Progressed range and repetitions as indicated.              Date:   2/24 Date:    Date:      ACTIVITY/EXERCISE AM PM AM PM AM PM   GROUP THERAPY  [ ]  [ ]  [ ]  [ ]  [ ]  [ ]   Ankle Pumps  15             Quad Sets  15             Gluteal Sets  15             Hip ABd/ADduction  15             Straight Leg Raises  15             Knee Slides  15             Short Arc Quads  15             Long Arc Quads               Chair Slides                               B = bilateral; AA = active assistive; A = active; P = passive       Treatment/Session Assessment:         Response to Treatment:  No difficulties     Education:  [ x] Home Exercises  [x ] Fall Precautions  [ ] Hip Precautions [ ] Going Home Video  [ ] Knee/Hip Prosthesis Review  [ x] Walker Management/Safety [ ] Adaptive Equipment as Needed         Interdisciplinary Collaboration:   · Registered Nurse     After treatment position/precautions:   · Up in chair, Bed/Chair-wheels locked, Call light within reach and RN notified     Compliance with Program/Exercises: Will assess as treatment progresses. Recommendations/Intent for next treatment session:  Treatment next visit will focus on increasing Mr. Clif Bazzi independence with bed mobility, transfers, gait training, strength/ROM exercises, modalities for pain, and patient education.        Total Treatment Duration:  PT Patient Time In/Time Out  Time In: 5457  Time Out: [address], PT

## 2017-03-14 NOTE — DISCHARGE INSTRUCTIONS
Preventing Falls: Care Instructions  Your Care Instructions  Getting around your home safely can be a challenge if you have injuries or health problems that make it easy for you to fall. Loose rugs and furniture in walkways are among the dangers for many older people who have problems walking or who have poor eyesight. People who have conditions such as arthritis, osteoporosis, or dementia also have to be careful not to fall. You can make your home safer with a few simple measures. Follow-up care is a key part of your treatment and safety. Be sure to make and go to all appointments, and call your doctor if you are having problems. It's also a good idea to know your test results and keep a list of the medicines you take. How can you care for yourself at home? Taking care of yourself  · You may get dizzy if you do not drink enough water. To prevent dehydration, drink plenty of fluids, enough so that your urine is light yellow or clear like water. Choose water and other caffeine-free clear liquids. If you have kidney, heart, or liver disease and have to limit fluids, talk with your doctor before you increase the amount of fluids you drink. · Exercise regularly to improve your strength, muscle tone, and balance. Walk if you can. Swimming may be a good choice if you cannot walk easily. · Have your vision and hearing checked each year or any time you notice a change. If you have trouble seeing and hearing, you might not be able to avoid objects and could lose your balance. · Know the side effects of the medicines you take. Ask your doctor or pharmacist whether the medicines you take can affect your balance. Sleeping pills or sedatives can affect your balance. · Limit the amount of alcohol you drink. Alcohol can impair your balance and other senses. · Ask your doctor whether calluses or corns on your feet need to be removed.  If you wear loose-fitting shoes because of calluses or corns, you can lose your balance and fall. · Talk to your doctor if you have numbness in your feet. Preventing falls at home  · Remove raised doorway thresholds, throw rugs, and clutter. Repair loose carpet or raised areas in the floor. · Move furniture and electrical cords to keep them out of walking paths. · Use nonskid floor wax, and wipe up spills right away, especially on ceramic tile floors. · If you use a walker or cane, put rubber tips on it. If you use crutches, clean the bottoms of them regularly with an abrasive pad, such as steel wool. · Keep your house well lit, especially [employer], and outside walkways. Use night-lights in areas such as hallways and bathrooms. Add extra light switches or use remote switches (such as switches that go on or off when you clap your hands) to make it easier to turn lights on if you have to get up during the night. · Install sturdy handrails on stairways. · Move items in your cabinets so that the things you use a lot are on the lower shelves (about waist level). · Keep a cordless phone and a flashlight with new batteries by your bed. If possible, put a phone in each of the main rooms of your house, or carry a cell phone in case you fall and cannot reach a phone. Or, you can wear a device around your neck or wrist. You push a button that sends a signal for help. · Wear low-heeled shoes that fit well and give your feet good support. Use footwear with nonskid soles. Check the heels and soles of your shoes for wear. Repair or replace worn heels or soles. · Do not wear socks without shoes on wood floors. · Walk on the grass when the sidewalks are slippery. If you live in an area that gets snow and ice in the winter, sprinkle salt on slippery steps and sidewalks. Preventing falls in the bath  · Install grab bars and nonskid mats inside and outside your shower or tub and near the toilet and sinks. · Use shower chairs and bath benches.   · Use a hand-held shower head that will allow you to sit while showering. · Get into a tub or shower by putting the weaker leg in first. Get out of a tub or shower with your strong side first.  · Repair loose toilet seats and consider installing a raised toilet seat to make getting on and off the toilet easier. · Keep your bathroom door unlocked while you are in the shower. Where can you learn more? Go to http://ligia-александр.info/. Enter [id number] [phone] in the search box to learn more about \"Preventing Falls: Care Instructions. \"  Current as of: August 4, 2016  Content Version: 11.1  © 7727-5841 [employer]. Care instructions adapted under license by [employer] (which disclaims liability or warranty for this information). If you have questions about a medical condition or this instruction, always ask your healthcare professional. [place] [address] any warranty or liability for your use of this information. Scrapes (Abrasions): Care Instructions  Your Care Instructions  Scrapes (abrasions) are wounds where your skin has been rubbed or torn off. Most scrapes do not go deep into the skin, but some may remove several layers of skin. Scrapes usually don't bleed much, but they may ooze pinkish fluid. Scrapes on the head or face may appear worse than they are. They may bleed a lot because of the good blood supply to this area. Most scrapes heal well and may not need a bandage. They usually heal within 3 to 7 days. A large, deep scrape may take 1 to 2 weeks or longer to heal. A scab may form on some scrapes. Follow-up care is a key part of your treatment and safety. Be sure to make and go to all appointments, and call your doctor if you are having problems. It's also a good idea to know your test results and keep a list of the medicines you take. How can you care for yourself at home? · If your doctor told you how to care for your wound, follow your doctor's instructions.  If you did not get instructions, follow this general advice:  ¨ Wash the scrape with clean water 2 times a day. Don't use hydrogen peroxide or alcohol, which can slow healing. ¨ You may cover the scrape with a thin layer of petroleum jelly, such as Vaseline, and a nonstick bandage. ¨ Apply more petroleum jelly and replace the bandage as needed. · Prop up the injured area on a pillow anytime you sit or lie down during the next 3 days. Try to keep it above the level of your heart. This will help reduce swelling. · Be safe with medicines. Take pain medicines exactly as directed. ¨ If the doctor gave you a prescription medicine for pain, take it as prescribed. ¨ If you are not taking a prescription pain medicine, ask your doctor if you can take an over-the-counter medicine. When should you call for help? Call your doctor now or seek immediate medical care if:  · You have signs of infection, such as:  ¨ Increased pain, swelling, warmth, or redness around the scrape. ¨ Red streaks leading from the scrape. ¨ Pus draining from the scrape. ¨ A fever. · The scrape starts to bleed, and blood soaks through the bandage. Oozing small amounts of blood is normal.  Watch closely for changes in your health, and be sure to contact your doctor if the scrape is not getting better each day. Where can you learn more? Go to http://ligia-александр.info/. Enter A374 in the search box to learn more about \"Scrapes (Abrasions): Care Instructions. \"  Current as of: May 27, 2016  Content Version: 11.1  © 0567-2472 [employer]. Care instructions adapted under license by [employer] (which disclaims liability or warranty for this information). If you have questions about a medical condition or this instruction, always ask your healthcare professional. [address] any warranty or liability for your use of this information.

## 2017-03-14 NOTE — ED NOTES
Pt presents to ER via EMS from home after falling out of his recliner approx 1 hr ago while reaching for a \"grabber\". Pt fell head first w/ rug burn to L frontal scalp as well as increased pain to R knee, pt denies any LOC, is pleasant and cooperative. Pt had full knee replacement to R knee 12 days ago, is receiving home rehab w/ pt living alone and family checking in on him frequently, this is his 1th or 5th fall since coming home w/ pt stating that his family is looking into inpatient rehab at this time.

## 2017-03-14 NOTE — ED NOTES
Wound Cleansed with Dermal ; Neosporin applied to forehead. I have reviewed discharge instructions with the patient. The patient verbalized understanding.

## 2017-03-14 NOTE — ED PROVIDER NOTES
HPI Comments: 79-year-old male fell recliner. He was lying in striking his head and right knee. Patient recently had a knee replacement on the right knee. Patient is a 80 y.o. male presenting with fall. The history is provided by the patient and the EMS personnel. Fall   The accident occurred 1 to 2 hours ago. Fall occurred: from his recliner. He fell from a height of ground level. He landed on carpet. The point of impact was the head and right knee. The pain is present in the right knee and head. The pain is moderate. Associated symptoms include headaches. Pertinent negatives include no visual change, no numbness, no abdominal pain, no loss of consciousness, no tingling and no laceration.         Past Medical History:   Diagnosis Date    Aortic valve stenosis, nonrheumatic 4/25/2016    Arrhythmia     before heart surgery    Arthritis     CAD (coronary artery disease)     Cor pulmonale (HCC)     Cyst near tailbone     Pilondial cyst    DDD (degenerative disc disease), lumbar     Depression     Diverticulosis     Edema 04/25/2016    mostly RLE    Former cigarette smoker     Genital edema, male 10/31/2016    GERD (gastroesophageal reflux disease)     takes Nexium    Heart murmur     Hepatitis B     High cholesterol     History of colon polyps 5/13/2013    History of hepatitis A     about 40 years ago    History of staph infection     Hypertension     takes [facility] term (current) use of anticoagulants     Plavix and Aspirin 325mg    Lumbago     Morbid obesity (HCC)     SOB (shortness of breath)     Spinal stenosis     Thyroid disease     hypothyroidism    TIA (transient ischemic attack) 5/13/2013    Transient ischemic attack (TIA) 2002    Unspecified sleep apnea 05/21/2013    pt denies       Past Surgical History:   Procedure Laterality Date    CARDIAC SURG PROCEDURE UNLIST  10/2016    open heart surgery    HX BACK SURGERY      x3    HX CAROTID ENDARTERECTOMY Left 2002    HX COLONOSCOPY      HX HEART CATHETERIZATION      HX HEART VALVE SURGERY      aortic valve    HX HERNIA REPAIR Left     L inguinal    HX ORTHOPAEDIC      discectomy x3         Family History:   Problem Relation Age of Onset    Cancer Mother      colon ca    Diabetes Mother     Hypertension Mother     Cancer Father      vocal cord ca       Social History     Social History    Marital status:      Spouse name: N/A    Number of children: N/A    Years of education: N/A     Occupational History    Not on file. Social History Main Topics    Smoking status: Former Smoker     Packs/day: 2.00     Years: 35.00     Quit date: 1/21/2004    Smokeless tobacco: Never Used    Alcohol use Yes      Comment: very occassionally    Drug use: No    Sexual activity: Not on file     Other Topics Concern    Not on file     Social History Narrative         ALLERGIES: Review of patient's allergies indicates no known allergies. Review of Systems   Constitutional: Negative. Negative for activity change. Eyes: Negative. Respiratory: Negative. Cardiovascular: Negative. Gastrointestinal: Negative. Negative for abdominal pain. Genitourinary: Negative. Musculoskeletal: Negative. Skin: Negative. Neurological: Positive for headaches. Negative for tingling, loss of consciousness and numbness. Psychiatric/Behavioral: Negative. All other systems reviewed and are negative. Vitals:    03/14/17 0437   BP: 133/64   Pulse: 84   Resp: 18   Temp: 98.2 °F (36.8 °C)   SpO2: 94%   Weight: 97.5 kg (215 lb)   Height: 5' 10\" (1.778 m)            Physical Exam   Constitutional: He is oriented to person, place, and time. He appears well-developed and well-nourished. No distress. HENT:   Head: Normocephalic. Head is with abrasion. Right Ear: External ear normal.   Left Ear: External ear normal.   Nose: Nose normal.   Mouth/Throat: Oropharynx is clear and moist. No oropharyngeal exudate.    Eyes: Conjunctivae and EOM are normal. Pupils are equal, round, and reactive to light. Right eye exhibits no discharge. Left eye exhibits no discharge. No scleral icterus. Neck: Normal range of motion. Neck supple. No JVD present. No tracheal deviation present. Cardiovascular: Normal rate, regular rhythm and intact distal pulses. Pulmonary/Chest: Effort normal and breath sounds normal. No stridor. No respiratory distress. He has no wheezes. He exhibits no tenderness. Abdominal: Soft. Bowel sounds are normal. He exhibits no distension and no mass. There is no tenderness. Musculoskeletal: Normal range of motion. He exhibits no edema or tenderness. Neurological: He is alert and oriented to person, place, and time. No cranial nerve deficit. Skin: Skin is warm and dry. No laceration and no rash noted. He is not diaphoretic. No erythema. No pallor. Psychiatric: He has a normal mood and affect. His behavior is normal. Thought content normal.   Nursing note and vitals reviewed. MDM  Number of Diagnoses or Management Options  Diagnosis management comments: Fall precautions. Assessment fall with abrasions. Follow-up with orthopedics as needed.        Amount and/or Complexity of Data Reviewed  Clinical lab tests: ordered and reviewed  Tests in the medicine section of CPT®: ordered and reviewed      ED Course       Procedures

## 2017-04-12 NOTE — ED TRIAGE NOTES
Patient sates he recently had left knee replacement and this caused him to fall today. Patient reports hitting head but denies LOC.

## 2017-04-12 NOTE — ED PROVIDER NOTES
HPI Comments: Patient presents to the ER today after having a mechanical fall. Patient has remote history of right knee replacement and has had some difficulty with ambulating since then. States today he believes he took a bad step and fell back and hit his left elbow as well as the posterior aspect of his head. Denies any loss of consciousness, nausea or vomiting. Denies any chest pain, belly pain or hip pain. Patient has been ambulatory since fall. Patient is a 80 y.o. male presenting with fall. The history is provided by the patient. Fall   The accident occurred less than 1 hour ago. The fall occurred while walking. He fell from a height of ground level. The point of impact was the head and left elbow. The pain is present in the left elbow and head. The pain is at a severity of 2/10. The pain is mild. He was ambulatory at the scene. There was no entrapment after the fall. There was no alcohol use involved in the accident. Pertinent negatives include no visual change, no fever, no numbness, no bowel incontinence, no vomiting, no extremity weakness, no loss of consciousness and no laceration. The risk factors include being elderly (recent knee surgery). The symptoms are aggravated by activity.         Past Medical History:   Diagnosis Date    Aortic valve stenosis, nonrheumatic 4/25/2016    Arrhythmia     before heart surgery    Arthritis     CAD (coronary artery disease)     Cor pulmonale (HCC)     Cyst near tailbone     Pilondial cyst    DDD (degenerative disc disease), lumbar     Depression     Diverticulosis     Edema 04/25/2016    mostly RLE    Former cigarette smoker     Genital edema, male 10/31/2016    GERD (gastroesophageal reflux disease)     takes Nexium    Heart murmur     Hepatitis B     High cholesterol     History of colon polyps 5/13/2013    History of hepatitis A     about 40 years ago    History of staph infection     Hypertension     takes [employer] term (current) use of anticoagulants     Plavix and Aspirin 325mg    Lumbago     Morbid obesity (HCC)     SOB (shortness of breath)     Spinal stenosis     Thyroid disease     hypothyroidism    TIA (transient ischemic attack) 5/13/2013    Transient ischemic attack (TIA) 2002    Unspecified sleep apnea 05/21/2013    pt denies       Past Surgical History:   Procedure Laterality Date    CARDIAC SURG PROCEDURE UNLIST  10/2016    open heart surgery    HX BACK SURGERY      x3    HX CAROTID ENDARTERECTOMY Left 2002    HX COLONOSCOPY      HX HEART CATHETERIZATION      HX HEART VALVE SURGERY      aortic valve    HX HERNIA REPAIR Left     L inguinal    HX ORTHOPAEDIC      discectomy x3         Family History:   Problem Relation Age of Onset    Cancer Mother      colon ca    Diabetes Mother     Hypertension Mother     Cancer Father      vocal cord ca       Social History     Social History    Marital status:      Spouse name: N/A    Number of children: N/A    Years of education: N/A     Occupational History    Not on file. Social History Main Topics    Smoking status: Former Smoker     Packs/day: 2.00     Years: 35.00     Quit date: 1/21/2004    Smokeless tobacco: Never Used    Alcohol use Yes      Comment: very occassionally    Drug use: No    Sexual activity: Not on file     Other Topics Concern    Not on file     Social History Narrative         ALLERGIES: Review of patient's allergies indicates no known allergies. Review of Systems   Constitutional: Negative for fatigue and fever. HENT: Negative for congestion and dental problem. Eyes: Negative for photophobia, redness and visual disturbance. Respiratory: Negative for chest tightness, shortness of breath and stridor. Cardiovascular: Negative for palpitations and leg swelling. Gastrointestinal: Negative for bowel incontinence and vomiting. Endocrine: Negative for polydipsia, polyphagia and polyuria.    Genitourinary: Negative for flank pain, frequency and urgency. Musculoskeletal: Negative for back pain and extremity weakness. Allergic/Immunologic: Negative for food allergies and immunocompromised state. Neurological: Negative for loss of consciousness, light-headedness and numbness. Hematological: Does not bruise/bleed easily. Psychiatric/Behavioral: Negative for behavioral problems and confusion. All other systems reviewed and are negative. Vitals:    04/12/17 1553   BP: 144/61   Pulse: 82   Resp: 16   Temp: 98.2 °F (36.8 °C)   SpO2: 97%   Weight: 97.5 kg (215 lb)   Height: 5' 10\" (1.778 m)            Physical Exam   Constitutional: He is oriented to person, place, and time. He appears well-developed and well-nourished. HENT:   Head: Normocephalic. Mouth/Throat: Oropharynx is clear and moist. No oropharyngeal exudate. Eyes: Conjunctivae and EOM are normal. Pupils are equal, round, and reactive to light. Cardiovascular: Normal rate and regular rhythm. Pulmonary/Chest: Effort normal and breath sounds normal.   Abdominal: Bowel sounds are normal. He exhibits no distension. There is no tenderness. Musculoskeletal: Normal range of motion. He exhibits no edema or tenderness. Left elbow: He exhibits normal range of motion, no swelling, no effusion and no deformity. Arms:  Neurological: He is alert and oriented to person, place, and time. Skin: Skin is warm and dry. No laceration noted. Nursing note and vitals reviewed. MDM  Number of Diagnoses or Management Options  Diagnosis management comments:  Will obtain CT scan of head given head trauma and use of plavix  Elbow exam other than bruising is unimpressive       Amount and/or Complexity of Data Reviewed  Tests in the radiology section of CPT®: ordered and reviewed    Risk of Complications, Morbidity, and/or Mortality  Presenting problems: low  Diagnostic procedures: low  Management options: low    Patient Progress  Patient progress: stable    ED Course       Procedures

## 2017-04-12 NOTE — DISCHARGE INSTRUCTIONS
Contusion: Care Instructions  Your Care Instructions  Contusion is the medical term for a bruise. It is the result of a direct blow or an impact, such as a fall. Contusions are common sports injuries. Most people think of a bruise as a black-and-blue spot. This happens when small blood vessels get torn and leak blood under the skin. But bones, muscles, and organs can also get bruised. This may damage deep tissues but not cause a bruise you can see. The doctor will do a physical exam to find the location of your contusion. You may also have tests to make sure you do not have a more serious injury, such as a broken bone or nerve damage. These may include X-rays or other imaging tests like a CT scan or MRI. Deep-tissue contusions may cause pain and swelling. But if there is no serious damage, they will often get better in a few weeks with home treatment. The doctor has checked you carefully, but problems can develop later. If you notice any problems or new symptoms, get medical treatment right away. Follow-up care is a key part of your treatment and safety. Be sure to make and go to all appointments, and call your doctor if you are having problems. It's also a good idea to know your test results and keep a list of the medicines you take. How can you care for yourself at home? · Put ice or a cold pack on the sore area for 10 to 20 minutes at a time to stop swelling. Put a thin cloth between the ice pack and your skin. · Be safe with medicines. Read and follow all instructions on the label. ¨ If the doctor gave you a prescription medicine for pain, take it as prescribed. ¨ If you are not taking a prescription pain medicine, ask your doctor if you can take an over-the-counter medicine. · If you can, prop up the sore area on pillows as much as possible for the next few days. Try to keep the sore area above the level of your heart. When should you call for help?   Call your doctor now or seek immediate medical care if:  · Your pain gets worse. · You have new or worse swelling. · You have tingling, weakness, or numbness in the area near the contusion. · The area near the contusion is cold or pale. Watch closely for changes in your health, and be sure to contact your doctor if:  · You do not get better as expected. Where can you learn more? Go to http://ligia-александр.info/. Enter W691 in the search box to learn more about \"Contusion: Care Instructions. \"  Current as of: May 27, 2016  Content Version: 11.2  © 2513-3463 [employer]. Care instructions adapted under license by [employer] (which disclaims liability or warranty for this information). If you have questions about a medical condition or this instruction, always ask your healthcare professional. [address] any warranty or liability for your use of this information. Bruises: Care Instructions  Your Care Instructions    Bruises occur when small blood vessels under the skin tear or rupture, most often from a twist, bump, or fall. Blood leaks into tissues under the skin and causes a black-and-blue spot that often turns colors, including purplish black, reddish blue, or yellowish green, as the bruise heals. Bruises hurt, but most are not serious and will go away on their own within 2 to 4 weeks. Sometimes, gravity causes them to spread down the body. A leg bruise usually will take longer to heal than a bruise on the face or arms. Follow-up care is a key part of your treatment and safety. Be sure to make and go to all appointments, and call your doctor if you are having problems. Its also a good idea to know your test results and keep a list of the medicines you take. How can you care for yourself at home? · Take pain medicines exactly as directed. ¨ If the doctor gave you a prescription medicine for pain, take it as prescribed.   ¨ If you are not taking a prescription pain medicine, ask your doctor if you can take an over-the-counter medicine. · Put ice or a cold pack on the area for 10 to 20 minutes at a time. Put a thin cloth between the ice and your skin. · If you can, prop up the bruised area on pillows as much as possible for the next few days. Try to keep the bruise above the level of your heart. When should you call for help? Call your doctor now or seek immediate medical care if:  · You have signs of infection, such as:  ¨ Increased pain, swelling, warmth, or redness. ¨ Red streaks leading from the bruise. ¨ Pus draining from the bruise. ¨ A fever. · You have a bruise on your leg and signs of a blood clot, such as:  ¨ Increasing redness and swelling along with warmth, tenderness, and pain in the bruised area. ¨ Pain in your calf, back of the knee, thigh, or groin. ¨ Redness and swelling in your leg or groin. · Your pain gets worse. Watch closely for changes in your health, and be sure to contact your doctor if:  · You do not get better as expected. Where can you learn more? Go to http://ligia-александр.info/. Enter [phone]) [phone] in the search box to learn more about \"Bruises: Care Instructions. \"  Current as of: May 27, 2016  Content Version: 11.2  © 5645-4241 [employer]. Care instructions adapted under license by [employer] (which disclaims liability or warranty for this information). If you have questions about a medical condition or this instruction, always ask your healthcare professional. [place] [address] any warranty or liability for your use of this information. Preventing Falls: Care Instructions  Your Care Instructions  Getting around your home safely can be a challenge if you have injuries or health problems that make it easy for you to fall. Loose rugs and furniture in walkways are among the dangers for many older people who have problems walking or who have poor eyesight.  People who have conditions such as arthritis, osteoporosis, or dementia also have to be careful not to fall. You can make your home safer with a few simple measures. Follow-up care is a key part of your treatment and safety. Be sure to make and go to all appointments, and call your doctor if you are having problems. It's also a good idea to know your test results and keep a list of the medicines you take. How can you care for yourself at home? Taking care of yourself  · You may get dizzy if you do not drink enough water. To prevent dehydration, drink plenty of fluids, enough so that your urine is light yellow or clear like water. Choose water and other caffeine-free clear liquids. If you have kidney, heart, or liver disease and have to limit fluids, talk with your doctor before you increase the amount of fluids you drink. · Exercise regularly to improve your strength, muscle tone, and balance. Walk if you can. Swimming may be a good choice if you cannot walk easily. · Have your vision and hearing checked each year or any time you notice a change. If you have trouble seeing and hearing, you might not be able to avoid objects and could lose your balance. · Know the side effects of the medicines you take. Ask your doctor or pharmacist whether the medicines you take can affect your balance. Sleeping pills or sedatives can affect your balance. · Limit the amount of alcohol you drink. Alcohol can impair your balance and other senses. · Ask your doctor whether calluses or corns on your feet need to be removed. If you wear loose-fitting shoes because of calluses or corns, you can lose your balance and fall. · Talk to your doctor if you have numbness in your feet. Preventing falls at home  · Remove raised doorway thresholds, throw rugs, and clutter. Repair loose carpet or raised areas in the floor. · Move furniture and electrical cords to keep them out of walking paths.   · Use nonskid floor wax, and wipe up spills right away, especially on ceramic tile floors. · If you use a walker or cane, put rubber tips on it. If you use crutches, clean the bottoms of them regularly with an abrasive pad, such as steel wool. · Keep your house well lit, especially [employer], and outside walkways. Use night-lights in areas such as hallways and bathrooms. Add extra light switches or use remote switches (such as switches that go on or off when you clap your hands) to make it easier to turn lights on if you have to get up during the night. · Install sturdy handrails on stairways. · Move items in your cabinets so that the things you use a lot are on the lower shelves (about waist level). · Keep a cordless phone and a flashlight with new batteries by your bed. If possible, put a phone in each of the main rooms of your house, or carry a cell phone in case you fall and cannot reach a phone. Or, you can wear a device around your neck or wrist. You push a button that sends a signal for help. · Wear low-heeled shoes that fit well and give your feet good support. Use footwear with nonskid soles. Check the heels and soles of your shoes for wear. Repair or replace worn heels or soles. · Do not wear socks without shoes on wood floors. · Walk on the grass when the sidewalks are slippery. If you live in an area that gets snow and ice in the winter, sprinkle salt on slippery steps and sidewalks. Preventing falls in the bath  · Install grab bars and nonskid mats inside and outside your shower or tub and near the toilet and sinks. · Use shower chairs and bath benches. · Use a hand-held shower head that will allow you to sit while showering. · Get into a tub or shower by putting the weaker leg in first. Get out of a tub or shower with your strong side first.  · Repair loose toilet seats and consider installing a raised toilet seat to make getting on and off the toilet easier. · Keep your bathroom door unlocked while you are in the shower.   Where can you learn more? Go to http://ligia-александр.info/. Enter [phone] in the search box to learn more about \"Preventing Falls: Care Instructions. \"  Current as of: August 4, 2016  Content Version: 11.2  © 1892-7090 [employer]. Care instructions adapted under license by [employer] (which disclaims liability or warranty for this information). If you have questions about a medical condition or this instruction, always ask your healthcare professional. [place] [address] any warranty or liability for your use of this information.

## 2017-07-19 PROBLEM — I50.22 SYSTOLIC CHF, CHRONIC (HCC): Chronic | Status: ACTIVE | Noted: 2017-01-01

## 2017-11-24 PROBLEM — N17.9 AKI (ACUTE KIDNEY INJURY) (HCC): Status: ACTIVE | Noted: 2017-01-01

## 2017-11-24 PROBLEM — J18.9 CAP (COMMUNITY ACQUIRED PNEUMONIA): Status: RESOLVED | Noted: 2017-01-01 | Resolved: 2017-01-01

## 2017-11-24 PROBLEM — L89.153 SACRAL DECUBITUS ULCER, STAGE III (HCC): Status: ACTIVE | Noted: 2017-01-01

## 2017-11-24 PROBLEM — J18.9 CAP (COMMUNITY ACQUIRED PNEUMONIA): Status: ACTIVE | Noted: 2017-01-01

## 2017-11-24 PROBLEM — J69.0 ASPIRATION PNEUMONIA (HCC): Status: ACTIVE | Noted: 2017-01-01

## 2017-11-24 PROBLEM — R13.10 DYSPHAGIA: Status: ACTIVE | Noted: 2017-01-01

## 2017-11-24 NOTE — PROGRESS NOTES
Sabine Joy RN notified of Lactic 3.3 and Aerobic blood cultures gram + cocci in clusters.  Harmony holguin notify MD

## 2017-11-24 NOTE — PROGRESS NOTES
Date of Outreach Update:  Ariadne Moya was seen and assessed. Previous Outreach assessment has been reviewed. There have been no significant clinical changes since the completion of the last dated Outreach assessment. Labs reviewed. Per primary RN, pt has another LA scheduled for this afternoon. Will continue to follow up per outreach protocol.     Signed By:   Hector Westfall RN    November 24, 2017 11:55 AM

## 2017-11-24 NOTE — PROGRESS NOTES
Pt alert to person and place, resting in bed. No acute distress on RA. IV abx infusing. ST on remote tele hr 119. Denies pain at this time. Door open and call bell in reach.

## 2017-11-24 NOTE — CDMP QUERY
Please clarify if this patient is being treated/managed for:    SEPSIS in the setting of Aspiration Pneumonia treating with IV fluids and IV Zosyn. =>Other Explanation of clinical findings  =>Unable to Determine (no explanation of clinical findings)    The medical record reflects the following:    Risk Factors: Aspiration Pneumonia     Clinical Indicators: tachycardia heart rate of 107  , leukocytosis with WBC of 23.2 , elevated Lactic of 3.2 and 3.4     Treatment: IVF and IV Zosyn     Please clarify and document your clinical opinion in the progress notes and discharge summary including the definitive and/or presumptive diagnosis, (suspected or probable), related to the above clinical findings. Please include clinical findings supporting your diagnosis.     Thanks,  Lesvia Medina RN, [address] [place] [address] Documentation Management Program  [phone]

## 2017-11-24 NOTE — PROGRESS NOTES
Critical Care Outreach Nurse Progress Report:    Subjective: In to assess pt secondary to nurse concern, elevated LA 3.4. MEWS Score: 2 (11/24/17 0151)    Vitals:    11/23/17 2221 11/23/17 2241 11/24/17 0100 11/24/17 0151   BP: 140/67 154/72 152/70 128/82   Pulse: (!) 101 (!) 101 (!) 108 (!) 108   Resp: 21 22 20 20   Temp:   98.5 °F (36.9 °C) 98 °F (36.7 °C)   SpO2: 96% 96% 95% 97%   Weight:    93.8 kg (206 lb 12.8 oz)   Height:            Objective: Adult male pt lying in bed with eyes open. Pain Intensity 1: 7 (11/24/17 0154)  Pain Location 1: Leg  Pain Intervention(s) 1: Medication (see MAR)  Patient Stated Pain Goal: 0    Assessment: Awake, alert, and oriented male pt lying in bed with eyes open. Pt has no complaints at this time. Pt is currently on RA and his oxygen saturation is 97%. HR is 109. Pt's EKG on admission shows ST.  Pulse feels regular. Nurse concerned d/t elevated LA of 3.4. Pt is currently receiving IVF at 150 ml/hr. Lung sounds are diminished. Pt has wound to sacrum. Wound care has been consulted per primary RN. Plan: Will follow pt per outreach protocol.

## 2017-11-24 NOTE — PROGRESS NOTES
Date of Outreach Update:  Salvador Salas was seen and assessed. Previous Outreach assessment has been reviewed. There have been no significant clinical changes since the completion of the last dated Outreach assessment. Pt resting comfortably in bed with eyes closed. Respirations even and unlabored. No distress noted. Will continue to follow up per outreach protocol.     Signed By:   Moshe Sidhu RN    November 24, 2017 3:34 PM

## 2017-11-24 NOTE — PROGRESS NOTES
Received to 819 agitated, aggressive. Transferred to bed. NS bolus continues. Admitted via data base. Skin assessment done with Siria Reagan RN. Open wound to sacral area. Lower extremities dry flaky. Abrasion to left knee and left elbow. Patient states he has fallen 14 times in past year. Posey alarm in use for safety.

## 2017-11-24 NOTE — H&P
[facility] 67   HISTORY AND PHYSICAL       Name:  Nidhi Reaves   MR#:  [id number]   :  1935   Account #:  [de-identified]   Date of Adm:  2017       CHIEF COMPLAINT: Weakness, dehydration. HISTORY OF PRESENT ILLNESS: The patient is an 82-year-old    male with past medical history of total knee   replacement, chronic aspiration, hypertension, coronary artery   disease, CABG, carotid artery stenosis, mitral valve   regurgitation, COPD, and degenerative disk disease. He presented   with a 2-3 day history of loss of appetite, dehydration,   recurrent falls, and vasovagal orthostatic changes. He is   telling me that he has upper abdominal pain as well as nausea   and vomiting. He had a CAT scan that showed aspiration   pneumonia. Abdomen is soft with no finding on the CAT scan. He   says that he lost appetite. PAST MEDICAL HISTORY: History of aspiration, COPD, peripheral   vascular disease, prostate hypertrophy, sleep apnea,   hypertension, mitral valve repair, and CABG. REVIEW OF SYSTEMS: As mentioned in HPI. SOCIAL HISTORY: He lives with family. No alcohol, no drugs, no   smoking. MEDICATIONS: Reviewed. ALLERGIES: NO KNOWN DRUG ALLERGY. PHYSICAL EXAMINATION   GENERAL: The patient is a very pleasant male in moderate   distress from shortness of breath. Alert and oriented x3. VITAL SIGNS: Temperature is 98.5. Heart rate is 108, respiratory   rate 22-30, blood pressure is 150/70 and his saturation is 97%   on room air. SKIN: Full of bruises, old and new. No rash. CARDIOVASCULAR: Normal S1, S2. No sounds or murmurs. RESPIRATORY: Decreased air entry  inspiratory crackles bilateral basilar,   especially posteriorly. EXTREMITIES: No edema. NEUROLOGIC: Grossly intact. LABORATORY DATA: White count 23,000, hemoglobin 15.7, platelets   484,156. Sodium 142, potassium 5.8, chloride 104, BUN 53,   creatinine 1.5.      ABDOMINAL CT SCAN: No changes other than the basilar infiltrate. IMPRESSION AND PLAN:   1. Aspiration pneumonia. 2. Acute kidney injury. 3. Dehydration. 4. Hyperkalemia. PLAN:   1. At this point, continue with antibiotic coverage for   aspiration pneumonia. Put him on aspiration precautions. PPI and   mechanical soft diet. Wait for cultures. 2. Acute kidney injury. IV hydration. 3. Hyperkalemia, being addressed with Kayexalate. The patient is   being cooperative for that. 4. He will be on heparin subcutaneous for DVT prophylaxis. CODE STATUS: FULL CODE.          MD LIVIER Hunter / Onofre Lyon   D:  11/24/2017   01:29   T:  11/24/2017   04:33   Job #:  [id number]

## 2017-11-24 NOTE — WOUND CARE
Left buttock with 1.5x1x0.5cm open area, with tunnel at 12 o'clock of 1.5cm using hydraphera blue rope moistened with NS and gauze at home, hydraphera blue is not on formulary here, recommend 1/4 inch Iodoform packing and gauze/ tegaderm while here, return to home cares at discharge. Left 5th toe is purple, right great toe is purple, DP and PT pulses bilateral are weakly palpable, concern for vascular status, although the purple area is consistent with Raynaud's. Dr. Hanh De La Paz notified of above.

## 2017-11-24 NOTE — ED NOTES
TRANSFER - OUT REPORT:    Verbal report given to [facility], RN (name) on Ayala Host  being transferred to [facility](unit) for routine progression of care       Report consisted of patients Situation, Background, Assessment and   Recommendations(SBAR). Information from the following report(s) SBAR, ED Summary and Recent Results was reviewed with the receiving nurse. Lines:   Peripheral IV 11/23/17 Right Antecubital (Active)        Opportunity for questions and clarification was provided.

## 2017-11-24 NOTE — PROGRESS NOTES
TRANSFER - IN REPORT:    Verbal report received from  [facility] RN on Melinda Hermosillo  being received from   er for routine progression of care      Report consisted of patients Situation, Background, Assessment and   Recommendations(SBAR). Information from the following report(s) ED Summary was reviewed with the receiving nurse. Opportunity for questions and clarification was provided. Assessment completed upon patients arrival to unit and care assumed.

## 2017-11-24 NOTE — PROGRESS NOTES
Late entry:    Dr. Rena Clayton, PP, notified of ABG results, pt's history, recent labs, and current condition. Orders obtained. Pt is in no distress. Oxygen saturation on 4L NC is 93%. Pt awakens easily to voice and is oriented x 3. Narcan given per order with no results. Labs drawn.

## 2017-11-24 NOTE — PROGRESS NOTES
Theron Abraham  Admission Date: 11/23/2017             Daily Progress Note: 11/24/2017    The patient's chart is reviewed and the patient is discussed with the staff. Theron Abraham is an [de-identified] M with PMH of aspiration, CAD s/p CABG, MVR, COPD, OA who was admitted on 11/23 with 2 days of poor appetite, falls, abd pain/n/v and patchy RLL infiltrate complicated by ANA, hyperkalemia, mild lactic acidosis. Subjective:   Wound care nurse assessing sacral decub and noted poor circulation in lower extremities. Pt says chronic. Lactic acid still 3.4 this morning.   Creatinine is down from 1.49 to 1.03    Current Facility-Administered Medications   Medication Dose Route Frequency    aspirin delayed-release tablet 81 mg  81 mg Oral DAILY    atorvastatin (LIPITOR) tablet 40 mg  40 mg Oral QHS    cholecalciferol (VITAMIN D3) tablet 1,000 Units  1,000 Units Oral DAILY    clopidogrel (PLAVIX) tablet 75 mg  75 mg Oral DAILY    cyanocobalamin tablet 1,000 mcg  1,000 mcg Oral DAILY    cyclobenzaprine (FLEXERIL) tablet 10 mg  10 mg Oral TID PRN    diazePAM (VALIUM) tablet 5 mg  5 mg Oral Q6H PRN    docusate sodium (COLACE) capsule 100 mg  100 mg Oral BID    DULoxetine (CYMBALTA) capsule 60 mg  60 mg Oral DAILY    dutasteride (AVODART) capsule 0.5 mg  0.5 mg Oral DAILY    pantoprazole (PROTONIX) tablet 40 mg  40 mg Oral ACB    levothyroxine (SYNTHROID) tablet 150 mcg  150 mcg Oral ACB    oxyCODONE IR (ROXICODONE) tablet 5 mg  5 mg Oral Q4H PRN    tamsulosin (FLOMAX) capsule 0.4 mg  0.4 mg Oral QHS    traZODone (DESYREL) tablet 150 mg  150 mg Oral QHS    sodium chloride (NS) flush 5-10 mL  5-10 mL IntraVENous Q8H    sodium chloride (NS) flush 5-10 mL  5-10 mL IntraVENous PRN    heparin (porcine) injection 5,000 Units  5,000 Units SubCUTAneous Q8H    0.9% sodium chloride infusion  150 mL/hr IntraVENous CONTINUOUS    piperacillin-tazobactam (ZOSYN) 3.375 g in 0.9% sodium chloride (MBP/ADV) 100 mL  3.375 g IntraVENous Q8H    gabapentin (NEURONTIN) capsule 600 mg  600 mg Oral DAILY       Review of Systems    Constitutional: negative for fever, chills, sweats  Cardiovascular: negative for chest pain, palpitations, syncope, edema  Gastrointestinal:  negative for dysphagia, reflux, vomiting, diarrhea, abdominal pain, or melena  Neurologic:  negative for focal weakness, numbness, headache    Objective:     Vitals:    11/23/17 2241 11/24/17 0100 11/24/17 0151 11/24/17 0742   BP: 154/72 152/70 128/82 111/73   Pulse: (!) 101 (!) 108 (!) 108 (!) 112   Resp: 22 20 20 18   Temp:  98.5 °F (36.9 °C) 98 °F (36.7 °C) 99 °F (37.2 °C)   SpO2: 96% 95% 97% 91%   Weight:   206 lb 12.8 oz (93.8 kg)    Height:         Intake and Output:   11/22 1901 - 11/24 0700  In: 903 [I.V.:767]  Out: -        Physical Exam:   Constitution:  the patient is well developed and in no acute distress  EENMT:  Sclera clear, pupils equal, oral mucosa moist  Respiratory: CTAB  Cardiovascular:  RRR without M,G,R  Gastrointestinal: soft and non-tender; with positive bowel sounds. Musculoskeletal: warm without cyanosis. There is no lower leg edema. Skin:  no jaundice or rashes, sacral wound is stage III without cellulitis  Neurologic: no gross neuro deficits     Psychiatric:  alert and oriented x 3    CT abd/pelvis:   IMPRESSION:  1. No discrete acute abnormality in the abdomen or pelvis. 2. Patchy pulmonary infiltrate posterior right lower lobe may represent  developing pneumonia. 3. There is colonic diverticulosis without CT evidence of acute diverticulosis.         LAB  No results for input(s): GLUCPOC in the last 72 hours.     No lab exists for component: [place]   Recent Labs      11/23/17 2016   WBC  23.2*   HGB  15.7   HCT  45.1   PLT  147*     Recent Labs      11/24/17   0435  11/23/17 2016   NA  144  142   K  4.0  5.8*   CL  107  104   CO2  21  24   GLU  143*  187*   BUN  46*  53*   CREA  1.03  1.49   MG   --   2.3   CA  8.4  9.3 TROIQ   --   <0.02*   ALB  2.6*  3.4   TBILI  0.7  1.2*   ALT  50  63   SGOT  114*  154*     No results for input(s): PH, PCO2, PO2, HCO3 in the last 72 hours. Recent Labs      11/24/17   0452   LAC  3.4*         Assessment:  (Medical Decision Making)     Hospital Problems  Date Reviewed: 7/19/2017          Codes Class Noted POA    Dysphagia ICD-10-CM: R13.10  ICD-9-CM: 787.20  11/24/2017 Unknown    On full liquid diet    ANA (acute kidney injury) ([facility] ICD-10-CM: N17.9  ICD-9-CM: 584.9  11/24/2017 Unknown    Continue IVF at 75/h    Sacral decubitus ulcer, stage III ([facility] ICD-10-CM: E36.936  ICD-9-CM: 707.03, 707.23  11/24/2017 Unknown    Wound care follwiign    * (Principal)Aspiration pneumonia ([facility] ICD-10-CM: J69.0  ICD-9-CM: 507.0  2/5/2017 Yes    Can likely narrow abx tomorrow    Sepsis ([facility] ICD-10-CM: A41.9  ICD-9-CM: 038.9, 995.91  2/5/2017 Yes    Hemodynamically stable. F/u leukocytosis and lactic acid. Plan:  (Medical Decision Making)     --continue zosyn day #2 today and decrease rate of IVF. Blood cultures, wound cx pending.  --change diet to full liquid which is what patient eats at home  --f/u labs tomorrow  --wound care  --PT consult given fall history  --DVT ppx    More than 50% of the time documented was spent in face-to-face contact with the patient and in the care of the patient on the floor/unit where the patient is located.     Vanna Lozano MD

## 2017-11-24 NOTE — PROGRESS NOTES
Repeat lactic due at 1700. Spoke with lab and Goff Punch to come draw. Harlan in room to check on patient. Updated on concerns for patient regarding HR and lethargy. ABG ordered.

## 2017-11-24 NOTE — PROGRESS NOTES
Date of Outreach Update:  Wandy Avila was seen and assessed. Previous Outreach assessment has been reviewed. There have been no significant clinical changes since the completion of the last dated Outreach assessment. Primary RN concerned d/t pt's drowsiness and HR. Pt is resting in bed with eyes closed. Current oxygen saturation is 81% on RA. Pt awakens easily to verbal stimuli but appears drowsy. Oxygen placed via NC. Dr. Jaylene James notified. Orders obtained. Oxygen saturation noted to be 91% on 4L. Lungs sounds are coarse bilaterally. HR is currently in 120s and ST.  Pt's temp is 97.8 and /80. . Will continue to follow up per outreach protocol.     Signed By:   Shakira Faria RN    November 24, 2017 5:44 PM

## 2017-11-24 NOTE — PROGRESS NOTES
Shift assessment completed. Pt. Alert and oriented to person and place. Room air. NS infusing @ 150ml/hr and iv antibiotic infusing. Incontinent to brief. Adilene nurse from ICU at bedside. Lactic acid 3.4 this am. Wound care consulted for wound noted to sacrum. Allevyn currently in place. Dr. Nilesh Michele made aware. No new orders at this time. Will monitor.  pt.

## 2017-11-24 NOTE — ED TRIAGE NOTES
Pt arrives via EMS for generalized weakness. EMS states pt has been week for several weeks with multiple falls. States weakness more today. EMS states son stated pt began hallucinating today. EMS states pt lives alone. Also c/o lower abd pain. Denies urinary symptoms. EMS states pt non compliant with meds, but does not take any blood thinners.

## 2017-11-24 NOTE — ED PROVIDER NOTES
HPI Comments: Few falls last couple of weeks-blames right knee replacement and denies loc. Hit head few weeks ago-denies headache. Appears dehydrated-says trouble eating and drinking due to \"hiatal hernia\". Patient is a [de-identified] y.o. male presenting with fatigue. The history is provided by the patient. Fatigue   This is a new problem. The current episode started more than 1 week ago. The problem has been gradually worsening. There was no focality noted. Primary symptoms comment: fatique, weakness. There has been no fever. Associated symptoms include vomiting and nausea. Pertinent negatives include no shortness of breath, no chest pain, no altered mental status and no headaches.         Past Medical History:   Diagnosis Date    Aortic valve stenosis, nonrheumatic 4/25/2016    Arrhythmia     before heart surgery    Arthritis     CAD (coronary artery disease)     Cor pulmonale (HCC)     Cyst near tailbone     Pilondial cyst    DDD (degenerative disc disease), lumbar     Depression     Diverticulosis     Edema 04/25/2016    mostly RLE    Former cigarette smoker     Genital edema, male 10/31/2016    GERD (gastroesophageal reflux disease)     takes Nexium    Heart murmur     Hepatitis B     High cholesterol     History of colon polyps 5/13/2013    History of hepatitis A     about 40 years ago    History of staph infection     Hypertension     takes [facility] term (current) use of anticoagulants     Plavix and Aspirin 325mg    Lumbago     Morbid obesity (HCC)     SOB (shortness of breath)     Spinal stenosis     Systolic CHF, chronic ([address] 7/19/2017    Thyroid disease     hypothyroidism    TIA (transient ischemic attack) 5/13/2013    Transient ischemic attack (TIA) 2002    Unspecified sleep apnea 05/21/2013    pt denies       Past Surgical History:   Procedure Laterality Date    CARDIAC SURG PROCEDURE UNLIST  10/2016    open heart surgery    HX BACK SURGERY      x3    HX CAROTID ENDARTERECTOMY Left 2002    HX COLONOSCOPY      HX HEART CATHETERIZATION      HX HEART VALVE SURGERY      aortic valve    HX HERNIA REPAIR Left     L inguinal    HX ORTHOPAEDIC      discectomy x3         Family History:   Problem Relation Age of Onset    Cancer Mother      colon ca    Diabetes Mother     Hypertension Mother     Cancer Father      vocal cord ca       Social History     Social History    Marital status:      Spouse name: N/A    Number of children: N/A    Years of education: N/A     Occupational History    Not on file. Social History Main Topics    Smoking status: Former Smoker     Packs/day: 2.00     Years: 35.00     Quit date: 1/21/2004    Smokeless tobacco: Never Used    Alcohol use Yes      Comment: very occassionally    Drug use: No    Sexual activity: Not on file     Other Topics Concern    Not on file     Social History Narrative         ALLERGIES: Review of patient's allergies indicates no known allergies. Review of Systems   Constitutional: Positive for fatigue. Negative for chills and fever. HENT: Negative for congestion, rhinorrhea and sore throat. Eyes: Negative for photophobia and redness. Respiratory: Negative for cough and shortness of breath. Cardiovascular: Negative for chest pain and leg swelling. Gastrointestinal: Positive for constipation, nausea and vomiting. Negative for abdominal pain, blood in stool and diarrhea. Dysphagia   Endocrine: Negative for polydipsia and polyuria. Genitourinary: Negative for dysuria and hematuria. Musculoskeletal: Negative for back pain and myalgias. Neurological: Negative for weakness, numbness and headaches. Vitals:    11/23/17 2011   BP: 136/60   Pulse: (!) 107   Resp: 20   Temp: 98.4 °F (36.9 °C)   SpO2: 96%   Weight: 91.6 kg (202 lb)   Height: 5' 10\" (1.778 m)            Physical Exam   Constitutional: He is oriented to person, place, and time.  He appears well-developed and well-nourished. HENT:   Mouth/Throat: No oropharyngeal exudate. Mucous membranes are dry   Eyes: Conjunctivae are normal. Pupils are equal, round, and reactive to light. Neck: Neck supple. Cardiovascular: Normal rate, regular rhythm, normal heart sounds and intact distal pulses. Pulmonary/Chest: Effort normal and breath sounds normal.   Abdominal: Soft. Bowel sounds are normal. He exhibits no distension. There is tenderness (diffuse lower abdominal tenderness, moderate in nature no pulsatile mass appreciated. ). There is no rebound and no guarding. Musculoskeletal: Normal range of motion. He exhibits no edema or tenderness. Lymphadenopathy:     He has no cervical adenopathy. Neurological: He is alert and oriented to person, place, and time. Skin: Skin is warm and dry. Palm sized area of injection with central 1 cm opening without draiange. Culture obntained. Non tender. Nursing note and vitals reviewed. MDM  Number of Diagnoses or Management Options  Diagnosis management comments: Clinically patient is profoundly dehydrated. Check electrolytes and kidney function. Hydrate. Abdomen is tender but did not complain of pain initially. Describes trouble swallowing for several weeks. He indicates he may have had a scope/EGD in the past and I'll search old records for this. He blames this on hiatal hernia.        Amount and/or Complexity of Data Reviewed  Clinical lab tests: ordered and reviewed  Tests in the radiology section of CPT®: ordered and reviewed      ED Course       Procedures

## 2017-11-24 NOTE — PROGRESS NOTES
ST on remote tele, hr 117. Recent lactic 2.6. Dr Faby Davis notified of increased HR. Order to continue to monitor and notify ICU rover of any further changes.

## 2017-11-24 NOTE — PROGRESS NOTES
Notified Dr. Donya Riley of patient lactic acid value 3.4 at this time. Instructed to continue IVF as ordered NS at 150 ml\hr. Read back and confirmed.

## 2017-11-25 NOTE — PROGRESS NOTES
Pharmacokinetic Consult to Uzair Ayala Host is a [de-identified] y.o. male being treated for GPC in 1 of 2 BCx with Vancomycin. Pt already receiving Zosyn for HAP. Height: 5' 10\" (177.8 cm)  Weight: 93.8 kg (206 lb 12.8 oz)  Lab Results   Component Value Date/Time    BUN 46 11/24/2017 04:35 AM    Creatinine 1.03 11/24/2017 04:35 AM    WBC 15.5 11/24/2017 06:12 PM    Procalcitonin 2.6 11/24/2017 06:12 PM    Lactic acid 3.1 11/24/2017 09:22 PM    Lactic Acid (POC) 3.2 11/23/2017 10:37 PM      Estimated Creatinine Clearance: 65.8 mL/min (based on Cr of 1.03). CULTURES:  11/23 Sacral Wound: few GPC, pending   BCx:  GPC in clusters (1 of 2), pending. (SA target DNA sequence not detected within the sample. Test performed by PCR)    Day 1 of vancomycin. Goal trough is 15-20. Vancomycin dose initiated at 1750mg load x1, then 1250mg q 12h. Will continue to follow patient.       Thank you,  Latasha Ownes, PharmD  Clinical Pharmacist  [phone]

## 2017-11-25 NOTE — PROGRESS NOTES
Melinda Hermosillo  Admission Date: 11/23/2017             Daily Progress Note: 11/25/2017    The patient's chart is reviewed and the patient is discussed with the staff. Melinda Hermosillo is an [de-identified] M with PMH of aspiration, CAD s/p CABG, MVR, COPD, OA who was admitted on 11/23 with 2 days of poor appetite, falls, abd pain/n/v and patchy RLL infiltrate complicated by ANA, hyperkalemia, mild lactic acidosis. Subjective:     Blood cx 1/2 for GPC in clusters c/w a staph. Remains weak and not feeling much better.      Current Facility-Administered Medications   Medication Dose Route Frequency    aspirin delayed-release tablet 81 mg  81 mg Oral DAILY    atorvastatin (LIPITOR) tablet 40 mg  40 mg Oral QHS    cholecalciferol (VITAMIN D3) tablet 1,000 Units  1,000 Units Oral DAILY    clopidogrel (PLAVIX) tablet 75 mg  75 mg Oral DAILY    cyanocobalamin tablet 1,000 mcg  1,000 mcg Oral DAILY    cyclobenzaprine (FLEXERIL) tablet 10 mg  10 mg Oral TID PRN    diazePAM (VALIUM) tablet 5 mg  5 mg Oral Q6H PRN    docusate sodium (COLACE) capsule 100 mg  100 mg Oral BID    DULoxetine (CYMBALTA) capsule 60 mg  60 mg Oral DAILY    dutasteride (AVODART) capsule 0.5 mg  0.5 mg Oral DAILY    pantoprazole (PROTONIX) tablet 40 mg  40 mg Oral ACB    levothyroxine (SYNTHROID) tablet 150 mcg  150 mcg Oral ACB    oxyCODONE IR (ROXICODONE) tablet 5 mg  5 mg Oral Q4H PRN    tamsulosin (FLOMAX) capsule 0.4 mg  0.4 mg Oral QHS    traZODone (DESYREL) tablet 150 mg  150 mg Oral QHS    sodium chloride (NS) flush 5-10 mL  5-10 mL IntraVENous PRN    heparin (porcine) injection 5,000 Units  5,000 Units SubCUTAneous Q8H    0.9% sodium chloride infusion  75 mL/hr IntraVENous CONTINUOUS    piperacillin-tazobactam (ZOSYN) 3.375 g in 0.9% sodium chloride (MBP/ADV) 100 mL  3.375 g IntraVENous Q8H    gabapentin (NEURONTIN) capsule 600 mg  600 mg Oral DAILY    vancomycin (VANCOCIN) 1250 mg in  ml infusion  1,250 mg IntraVENous Q12H       Review of Systems    Constitutional: negative for fever, chills, sweats  Cardiovascular: negative for chest pain, palpitations, syncope, edema  Gastrointestinal:  negative for dysphagia, reflux, vomiting, diarrhea, abdominal pain, or melena  Neurologic:  negative for focal weakness, numbness, headache    Objective:     Vitals:    11/24/17 2318 11/25/17 0354 11/25/17 0640 11/25/17 0801   BP: 127/82 126/88  130/71   Pulse: (!) 119 (!) 118  (!) 112   Resp: 18 22 20   Temp: 98.9 °F (37.2 °C) 99.2 °F (37.3 °C)  98.7 °F (37.1 °C)   SpO2: 93% 94%  93%   Weight:   215 lb 12.8 oz (97.9 kg)    Height:         Intake and Output:   11/23 1901 - 11/25 0700  In: 0858 [P.O.:30; I.V.:1734]  Out: -   11/25 0701 - 11/25 1900  In: 368 [P.O.:368]  Out: -     Physical Exam:   Constitution:  the patient is well developed and in no acute distress  EENMT:  Sclera clear, pupils equal, oral mucosa moist  Respiratory: CTAB  Cardiovascular:  RRR without M,G,R  Gastrointestinal: soft and non-tender; with positive bowel sounds. Musculoskeletal: warm without cyanosis. There is no lower leg edema. Skin:  no jaundice or rashes, sacral wound is stage III without cellulitis  Neurologic: no gross neuro deficits     Psychiatric:  alert and oriented x 3    CT abd/pelvis:   IMPRESSION:  1. No discrete acute abnormality in the abdomen or pelvis. 2. Patchy pulmonary infiltrate posterior right lower lobe may represent  developing pneumonia.   3. There is colonic diverticulosis without CT evidence of acute diverticulosis.         LAB  Recent Labs      11/24/17   1744   GLUCPOC  213*      Recent Labs      11/25/17   0636  11/24/17   1812  11/23/17 2016   WBC  15.0*  15.5*  23.2*   HGB  13.9  14.0  15.7   HCT  43.1  41.3  45.1   PLT  133*  132*  147*     Recent Labs      11/25/17   0636  11/24/17   0435  11/23/17 2016   NA  144  144  142   K  3.7  4.0  5.8*   CL  110*  107  104   CO2  22  21  24   GLU  226*  143*  187*   BUN 41*  46*  53*   CREA  1.04  1.03  1.49   MG   --    --   2.3   CA  8.4  8.4  9.3   TROIQ   --    --   <0.02*   ALB   --   2.6*  3.4   TBILI   --   0.7  1.2*   ALT   --   50  63   SGOT   --   114*  154*     Recent Labs      11/24/17   1750   PH  7.35   PCO2  40   PO2  77*   HCO3  22     Recent Labs      11/25/17   0636  11/25/17   0222  11/24/17   2122   LAC  2.9*  2.0  3.1*     Blood cx: 1/2 GPC in clusters. Assessment:  (Medical Decision Making)     Hospital Problems  Date Reviewed: 7/19/2017          Codes Class Noted POA    Dysphagia ICD-10-CM: R13.10  ICD-9-CM: 787.20  11/24/2017 Unknown    On full liquid diet    ANA (acute kidney injury) ([facility] ICD-10-CM: N17.9  ICD-9-CM: 584.9  11/24/2017 Unknown    Wean IVF to 50 ml/hr    Sacral decubitus ulcer, stage III ([facility] ICD-10-CM: C11.989  ICD-9-CM: 707.03, 707.23  11/24/2017 Unknown    Wound care following    * (Principal)Aspiration pneumonia ([facility] ICD-10-CM: J69.0  ICD-9-CM: 507.0  2/5/2017 Yes    Can likely narrow abx tomorrow    Sepsis ([facility] ICD-10-CM: A41.9  ICD-9-CM: 038.9, 995.91  2/5/2017 Yes    Blood cx 1/2 GPC in clusters. Plan:  (Medical Decision Making)     Continue zosyn day and VancD #3 today. Decrease IVF  Wound care  Follow blood cx. More than 50% of the time documented was spent in face-to-face contact with the patient and in the care of the patient on the floor/unit where the patient is located.     Alexander Collins MD

## 2017-11-25 NOTE — PROGRESS NOTES
Problem: Mobility Impaired (Adult and Pediatric)  Goal: *Acute Goals and Plan of Care (Insert Text)  STG:  (1.)Mr. Trini Everett will move from supine to sit and sit to supine , scoot up and down and roll side to side with MINIMAL ASSIST within 5 day(s). (2.)Mr. Trini Everett will transfer from bed to chair and chair to bed with MINIMAL ASSIST using the least restrictive device within 5 day(s). (3.)Mr. Trini Everett will ambulate with MINIMAL ASSIST for 15 feet with the least restrictive device within 5 day(s). (4.)Mr. Trini Everett will perform standing static and dynamic balance activities x 10 minutes with MINIMAL ASSIST to improve safety within 5 day(s). (5.)Mr. Trini Everett will maintain stable vital signs throughout all functional mobility within 5 days. LTG:  (1.)Mr. Trini Everett will move from supine to sit and sit to supine , scoot up and down and roll side to side in bed with CONTACT GUARD ASSIST within 10 day(s). (2.)Mr. Trini Everett will transfer from bed to chair and chair to bed with CONTACT GUARD ASSIST using the least restrictive device within 10 day(s). (3.)Mr. Trini Everett will ambulate with CONTACT GUARD ASSIST for 25 feet with the least restrictive device within 10 day(s). ________________________________________________________________________________________________      PHYSICAL THERAPY: Initial Assessment, AM 11/25/2017  INPATIENT: Hospital Day: 3  [place] / [place] / Product Type: Medicare /      NAME/AGE/GENDER: Jesus Jordan is a [de-identified] y.o. male   PRIMARY DIAGNOSIS: CAP (community acquired pneumonia) Aspiration pneumonia ([address] Aspiration pneumonia ([address]        ICD-10: Treatment Diagnosis:   · Difficulty in walking, Not elsewhere classified (R26.2)   Precaution/Allergies:  Review of patient's allergies indicates no known allergies. ASSESSMENT:     Mr. Trini Everett is a [de-identified] y.o. male with CAP.  He lives alone in 2 story home with a lift chair and reports he typically sleeps in a sleeper bed, ambulates with a rollator walker and takes sponge baths independently. His daughter in law is present and he reports they check on him in person twice a week and call him nightly. He has experienced 6-8 falls in the past 6 months and has a history of R TKA earlier in 2017. He is supine on contact, currently on 2 L O2 (not on home O2) and agreeable to therapy. Patient very frustrated with bed mobility, as he does not have to perform this at home. He transferred to sitting with moderate assistance, stood with moderate/maximal assistance and was not able to take steps. Reported he was \"afraid of rolling walker because it doesn't have brakes. \" He was able to scoot up in bed at edge of bed with CGA and required maximal assistance to return to supine. At this time, patient presents at a very high fall risk. He is extremely resistant to suggestions of continued therapy, although he will likely need more assistance at d/c. Gladys Lewis is currently functioning below his baseline and would benefit from skilled PT during acute care stay to maximize safety and independence with functional mobility. This section established at most recent assessment   PROBLEM LIST (Impairments causing functional limitations):  1. Decreased Strength  2. Decreased ADL/Functional Activities  3. Decreased Transfer Abilities  4. Decreased Ambulation Ability/Technique  5. Decreased Balance  6. Increased Pain  7. Decreased Activity Tolerance  8. Decreased Pacing Skills  9. Increased Shortness of Breath  10. Decreased Knowledge of Precautions  11. Decreased [place] with Home Exercise Program  12. Decreased Cognition   INTERVENTIONS PLANNED: (Benefits and precautions of physical therapy have been discussed with the patient.)  1. Balance Exercise  2. Bed Mobility  3. Family Education  4. Gait Training  5. Home Exercise Program (HEP)  6. Therapeutic Activites  7. Therapeutic Exercise/Strengthening  8. Transfer Training  9.  Patient Education  10. Group Therapy     TREATMENT PLAN: Frequency/Duration: 3 times a week for duration of hospital stay  Rehabilitation Potential For Stated Goals: Guarded     RECOMMENDED REHABILITATION/EQUIPMENT: (at time of discharge pending progress): Due to the probability of continued deficits (see above) this patient will likely need continued skilled physical therapy after discharge. Equipment:    None at this time              HISTORY:   History of Present Injury/Illness (Reason for Referral):  Per MD Note: \"The patient is an 79-year-old    male with past medical history of total knee   replacement, chronic aspiration, hypertension, coronary artery   disease, CABG, carotid artery stenosis, mitral valve   regurgitation, COPD, and degenerative disk disease. He presented   with a 2-3 day history of loss of appetite, dehydration,   recurrent falls, and vasovagal orthostatic changes. He is   telling me that he has upper abdominal pain as well as nausea   and vomiting. He had a CAT scan that showed aspiration   pneumonia. Abdomen is soft with no finding on the CAT scan. He   says that he lost appetite. \"  Past Medical History/Comorbidities:   Mr. Emely Arevalo  has a past medical history of Aortic valve stenosis, nonrheumatic (4/25/2016); Arrhythmia; Arthritis; CAD (coronary artery disease); Cor pulmonale ([address] Cyst near tailbone; DDD (degenerative disc disease), lumbar; Depression; Diverticulosis; Edema (04/25/2016); Former cigarette smoker; Genital edema, male (10/31/2016); GERD (gastroesophageal reflux disease); Heart murmur; Hepatitis B; High cholesterol; History of colon polyps (5/13/2013); History of hepatitis A; History of staph infection; Hypertension; Long term (current) use of anticoagulants; Lumbago; Morbid obesity ([address] SOB (shortness of breath); Spinal stenosis; Systolic CHF, chronic ([address] (7/19/2017); Thyroid disease; TIA (transient ischemic attack) (5/13/2013);  Transient ischemic attack (TIA) (2002); and Unspecified sleep apnea (05/21/2013). He also has no past medical history of Aneurysm ([facility] Asthma; Autoimmune disease ([facility] Cancer ([facility] Chronic kidney disease; Chronic pain; Coagulation disorder ([facility] Diabetes ([facility] Difficult intubation; Endocarditis; Malignant hyperthermia due to anesthesia; Nausea & vomiting; Pseudocholinesterase deficiency; PUD (peptic ulcer disease); Rheumatic fever; Seizures ([facility] or Thromboembolus ([facility] Mr. Nilda García  has a past surgical history that includes cardiac surg procedure unlist (10/2016); orthopaedic; hernia repair (Left); heart catheterization; colonoscopy; carotid endarterectomy (Left, 2002); back surgery; and heart valve surgery. Social History/Living Environment:   Home Environment: Private residence  # Steps to Enter: 1  One/Two Story Residence: Two story, live on 1st floor  # of Interior Steps: [id number] [facility] Floor: Both  Lift Chair Available: Yes  Living Alone: Yes  Support Systems: Family member(s), Child(gali)  Patient Expects to be Discharged to[de-identified] Private residence  Current DME Used/Available at Home: Walker, rollator, Walker, rolling, Lift chair (sleeper sofa)  Prior Level of Function/Work/Activity:  Patient ambulated with a rollator walker prior to admission, unable to perform bed mobility (sleeper chair) and took sponge baths. Number of Personal Factors/Comorbidities that affect the Plan of Care: 3+: HIGH COMPLEXITY   EXAMINATION:   Most Recent Physical Functioning:   Gross Assessment:  AROM: Generally decreased, functional  PROM: Generally decreased, functional  Strength: Generally decreased, functional  Coordination: Generally decreased, functional  Sensation: Intact               Posture:  Posture (WDL): Exceptions to WDL  Posture Assessment:  Forward head, Kyphosis, Rounded shoulders, Trunk flexion  Balance:  Sitting: Impaired  Sitting - Static: Fair (occasional)  Sitting - Dynamic: Fair (occasional)  Standing: Impaired  Standing - Static: Constant support Bed Mobility:  Supine to Sit: Moderate assistance  Sit to Supine: Maximum assistance  Scooting: Moderate assistance  Wheelchair Mobility:     Transfers:  Sit to Stand: Moderate assistance  Stand to Sit: Moderate assistance  Gait:            Body Structures Involved:  1. Heart  2. Lungs  3. Muscles Body Functions Affected:  1. Sensory/Pain  2. Respiratory  3. Neuromusculoskeletal  4. Movement Related Activities and Participation Affected:  1. Learning and Applying Knowledge  2. General Tasks and Demands  3. Mobility  4. Self Care  5. Domestic Life  6. Interpersonal Interactions and Relationships  7. Community, Social and [place]   Number of elements that affect the Plan of Care: 4+: HIGH COMPLEXITY   CLINICAL PRESENTATION:   Presentation: Evolving clinical presentation with changing clinical characteristics: MODERATE COMPLEXITY   CLINICAL DECISION MAKIN [facility] Inpatient Short Form  How much difficulty does the patient currently have. .. Unable A Lot A Little None   1. Turning over in bed (including adjusting bedclothes, sheets and blankets)? [] 1   [x] 2   [] 3   [] 4   2. Sitting down on and standing up from a chair with arms ( e.g., wheelchair, bedside commode, etc.)   [] 1   [x] 2   [] 3   [] 4   3. Moving from lying on back to sitting on the side of the bed? [] 1   [x] 2   [] 3   [] 4   How much help from another person does the patient currently need. .. Total A Lot A Little None   4. Moving to and from a bed to a chair (including a wheelchair)? [] 1   [x] 2   [] 3   [] 4   5. Need to walk in hospital room? [x] 1   [] 2   [] 3   [] 4   6. Climbing 3-5 steps with a railing? [x] 1   [] 2   [] 3   [] 4   © , Trustees of [address] [place] [address] [address] [id number], under license to [employer].  All rights reserved      Score:  Initial: 10 Most Recent: X (Date: -- )    Interpretation of Tool:  Represents activities that are increasingly more difficult (i.e. Bed mobility, Transfers, Gait). Score 24 23 22-20 19-15 14-10 9-7 6     Modifier CH CI CJ CK CL CM CN      ? Mobility - Walking and Moving Around:     - CURRENT STATUS: CL - 60%-79% impaired, limited or restricted    - GOAL STATUS: CK - 40%-59% impaired, limited or restricted    - D/C STATUS:  ---------------To be determined---------------  [place] / [place] / Product Type: Medicare /      Medical Necessity:     · Patient demonstrates guarded rehab potential due to higher previous functional level. Reason for Services/Other Comments:  · Patient continues to require modification of therapeutic interventions to increase complexity of exercises. Use of outcome tool(s) and clinical judgement create a POC that gives a: Questionable prediction of patient's progress: MODERATE COMPLEXITY            TREATMENT:   (In addition to Assessment/Re-Assessment sessions the following treatments were rendered)   Pre-treatment Symptoms/Complaints:  none  Pain: Initial:   Pain Intensity 1: 0  Post Session:  0/10     Assessment/Reassessment only, no treatment provided today    Braces/Orthotics/Lines/Etc:   · IV  · O2 Device: Nasal cannula  Treatment/Session Assessment:    · Response to Treatment:  Patient tolerated treatment fairly. · Interdisciplinary Collaboration:   o Physical Therapist  o Registered Nurse  · After treatment position/precautions:   o Supine in bed  o Bed alarm/tab alert on  o Bed/Chair-wheels locked  o Bed in low position  o Call light within reach  o RN notified  o Family at bedside   · Compliance with Program/Exercises: Will assess as treatment progresses. · Recommendations/Intent for next treatment session: \"Next visit will focus on advancements to more challenging activities and reduction in assistance provided\".   Total Treatment Duration:  PT Patient Time In/Time Out  Time In: 1031  Time Out: 404 [facility]

## 2017-11-25 NOTE — PROGRESS NOTES
Physical assessment completed, pt resting in bed, no s/s of pain or distress, tele in place, 75 ml/hr, of N/S respiration even and unlabored.

## 2017-11-25 NOTE — PROGRESS NOTES
Lactic 3.1. Dr. Schneider [place] notified. Repeat lactic ordered for 0200. Respirations even 96% on 3l nc. ST on remote tele hr 110. NS @ 75, IV Zosyn and IV Vanc infusing. Door open.

## 2017-11-26 PROBLEM — E87.20 LACTIC ACIDOSIS: Status: ACTIVE | Noted: 2017-01-01

## 2017-11-26 PROBLEM — A41.2 SEPTIC SHOCK DUE TO STAPHYLOCOCCUS (HCC): Status: ACTIVE | Noted: 2017-01-01

## 2017-11-26 PROBLEM — R65.21 SEPTIC SHOCK DUE TO STAPHYLOCOCCUS (HCC): Status: ACTIVE | Noted: 2017-01-01

## 2017-11-26 PROBLEM — J96.00 ACUTE RESPIRATORY FAILURE (HCC): Status: ACTIVE | Noted: 2017-01-01

## 2017-11-26 NOTE — PROGRESS NOTES
Hospitalist Progress Note for Rapid Response  2017  Admit Date: 2017  8:13 PM   NAME: Erica Collins   :  1935   MRN:  [id number]   Attending: Yesi Beltran MD  PCP:  Radha Oconnor MD    SUBJECTIVE:   CC:  Rapid Response called for: hypoxia; resp distress    S: Patient is a [de-identified] y.o. male who is admitted for CAP and ANA, hyperK, and mild lactic acidosis. Noted by RN to be lethargic and dropped O2 sats. She increased O2 with no improvement. Gave narcan, as that was needed yesterday; but last dose narcotics about noon; no significant change after narcan. Ultimately, placed on NRB with sats in mid 90's. Pt too lethargic to answer questions. Will state his first name but nothing else. Review of Systems Unable due to lethargy and acute illness    PHYSICAL EXAM     Visit Vitals    /65    Pulse (!) 128    Temp 99.5 °F (37.5 °C)  Comment: axillary    Resp 26    Ht 5' 10\" (1.778 m)    Wt 97.9 kg (215 lb 12.8 oz)    SpO2 96%  Comment: 15LNR    BMI 30.96 kg/m2      Temp (24hrs), Av.1 °F (37.3 °C), Min:98 °F (36.7 °C), Max:99.5 °F (37.5 °C)    Oxygen Therapy  O2 Sat (%): 96 % (15LNR) (17 1914)  Pulse via Oximetry: 108 beats per minute (17 0100)  O2 Device: Nasal cannula (17 1800)  O2 Flow Rate (L/min): 3 l/min (17 2217)    Intake/Output Summary (Last 24 hours) at 17 1929  Last data filed at 17 1805   Gross per 24 hour   Intake             2858 ml   Output                0 ml   Net             2858 ml        General:  Lethargic    Lungs:  CTA in mid and upper lobes Bilaterally- pt cannot sit up to adequately listen to lower lobes;  NRB; increased work of breathing  Heart:   tachy Regular rate and rhythm,  No murmur, rub, or gallop  Abdomen: Soft, Non distended, Non tender, Positive bowel sounds  Extremities: No cyanosis, clubbing or edema;   Mottled appearance of legs, but warm  Neurologic:  No focal deficits noted - but full assessment unable       Data Review:   Recent Results (from the past 24 hour(s))   LACTIC ACID    Collection Time: 11/24/17  9:22 PM   Result Value Ref Range    Lactic acid 3.1 (HH) 0.4 - 2.0 MMOL/L   LACTIC ACID    Collection Time: 11/25/17  2:22 AM   Result Value Ref Range    Lactic acid 2.0 0.4 - 2.0 MMOL/L   CBC W/O DIFF    Collection Time: 11/25/17  6:36 AM   Result Value Ref Range    WBC 15.0 (H) 4.3 - 11.1 K/uL    RBC 4.48 4.23 - 5.67 M/uL    HGB 13.9 13.6 - 17.2 g/dL    HCT 43.1 41.1 - 50.3 %    MCV 96.2 79.6 - 97.8 FL    MCH 31.0 26.1 - 32.9 PG    MCHC 32.3 31.4 - 35.0 g/dL    RDW 14.7 (H) 11.9 - 14.6 %    PLATELET 023 (L) 292 - 450 K/uL    MPV 11.0 10.8 - 92.6 FL   METABOLIC PANEL, BASIC    Collection Time: 11/25/17  6:36 AM   Result Value Ref Range    Sodium 144 136 - 145 mmol/L    Potassium 3.7 3.5 - 5.1 mmol/L    Chloride 110 (H) 98 - 107 mmol/L    CO2 22 21 - 32 mmol/L    Anion gap 12 7 - 16 mmol/L    Glucose 226 (H) 65 - 100 mg/dL    BUN 41 (H) 8 - 23 MG/DL    Creatinine 1.04 0.8 - 1.5 MG/DL    GFR est AA >60 >60 ml/min/1.73m2    GFR est non-AA >60 >60 ml/min/1.73m2    Calcium 8.4 8.3 - 10.4 MG/DL   LACTIC ACID    Collection Time: 11/25/17  6:36 AM   Result Value Ref Range    Lactic acid 2.9 (HH) 0.4 - 2.0 MMOL/L   GLUCOSE, POC    Collection Time: 11/25/17  6:37 PM   Result Value Ref Range    Glucose (POC) 194 (H) 65 - 100 mg/dL   BLOOD GAS & LYTES, ARTERIAL    Collection Time: 11/25/17  7:13 PM   Result Value Ref Range    pH 7.35 7.35 - 7.45      PCO2 37 35 - 45 mmHg    PO2 72 (L) 80 - 105 mmHg    BICARBONATE 20 (L) 22 - 26 mmol/L    BASE DEFICIT 4.9 (H) 0 - 2 mmol/L    TOTAL HEMOGLOBIN 14.7 11.7 - 15.0 GM/DL    O2 SAT 94 92 - 98.5 %    Arterial O2 Hgb 92.2 (L) 94 - 97 %    CARBOXYHEMOGLOBIN 1.0 0.5 - 1.5 %    METHEMOGLOBIN 0.6 0.0 - 1.5 %    DEOXYHEMOGLOBIN 6 (H) 0.0 - 5.0 %    Sodium 140.5 135 - 148 MMOL/L    POTASSIUM 3.88 3.5 - 5.3 MMOL/L    CHLORIDE 107 (H) 98 - 106      Calcium, ionized 1.22 1.0 - 1.3 mmol/L    SITE LR     ALLENS TEST POSITIVE      MODE NRB     FIO2 80.0 %    O2 FLOW 15.00 L/min    Respiratory comment: RT Soila at 11 25 2017 7 19 48 PM. Read back. CXR Results  (Last 48 hours)               11/24/17 1931  XR CHEST SNGL V Final result    Narrative:  History: Dyspnea       Exam: portable chest       Comparison: 11/23/2017       Findings: There is new left basilar atelectasis. There are low lung volumes. Stable postoperative change noted. No pneumothorax. Impressions: New left basilar atelectasis. The exam is otherwise stable. 11/23/17 2023  XR CHEST PORT Final result    Impression:  IMPRESSION:  Negative for acute change. Narrative:  CHEST X-RAY, one view. HISTORY:  Generalized weakness. TECHNIQUE:  AP upright portable view. COMPARISON:  1 February 2017. FINDINGS:   The lungs are clear. The heart size is normal. The costophrenic   angles are sharp. The pulmonary vasculature is unremarkable. Included portion of   the upper abdomen is unremarkable. There are sternal wires. CT Results  (Last 48 hours)               11/23/17 2314  CT ABD PELV WO CONT Final result    Impression:  IMPRESSION:   1. No discrete acute abnormality in the abdomen or pelvis. 2. Patchy pulmonary infiltrate posterior right lower lobe may represent   developing pneumonia. 3. There is colonic diverticulosis without CT evidence of acute diverticulosis. Narrative:  CT abdomen and pelvis without contrast        Comparison: 02/03/2017. Indication: Lower abdominal pain. Technique:  CT imaging of the abdomen and pelvis was performed without   intravenous contrast. Radiation dose reduction techniques were used for this   study:  Our CT scanners use one or all of the following: Automated exposure   control, adjustment of the mA and/or kVp according to patient's size, iterative   reconstruction.            Findings:    ABDOMEN CT:   Focal patchy infiltrate right lower lobe image 13. There are no pleural   effusions. The lack of intravenous contrast limits full evaluation of the abdominal   viscera. The liver is grossly normal in appearance. There is no intra or   extrahepatic biliary ductal dilatation. Unenhanced appearance of the right kidney, adrenal glands, pancreas, spleen,   gallbladder unremarkable. Left renal nonobstructing 3 mm stone. There are   atherosclerotic calcifications noted within the aorta and its branches. Small   hiatal hernia. PELVIS CT:   The bowel is normal in caliber, and there is no gross focal or diffuse bowel   wall thickening. Moderate sigmoid diverticulosis. Moderate amount of distal   retained colonic stool. There is no free air or free fluid in the abdomen or pelvis. There is no   significant retroperitoneal, pelvic, mesenteric, or inguinal lymphadenopathy. The bladder is unremarkable. There are no aggressive appearing osseous lesions. 11/23/17 2314  CT HEAD WO CONT Final result    Impression:  Impression:    1. No acute intracranial process. Narrative:  Noncontrast CT of the brain. Comparison: 4/12/2017       Indication: Weakness, falls       Technique: Contiguous axial images were obtained from the skull base through the   vertex without IV contrast. Radiation dose reduction techniques were used for   this study:  Our CT scanners use one or all of the following: Automated exposure   control, adjustment of the mA and/or kVp according to patient's size, iterative   reconstruction. Findings:    No evidence of mass effect or midline shift. Negative for space-occupying   lesion or intracranial hemorrhage. No evidence of acute cortical-based area of   infarction. No extra-axial fluid collections. Ventricles and sulci are appropriate for the   patient's age. Basal cisterns are patent.         Visualized portions of the orbits are normal. Paranasal sinuses and mastoids are   normal.       No acute fracture.                  ASSESSMENT      Active Hospital Problems    Diagnosis Date Noted    Dysphagia 11/24/2017    ANA (acute kidney injury) ([facility] [address] 11/24/2017    Sacral decubitus ulcer, stage III ([facility] [address] 11/24/2017    Aspiration pneumonia ([facility] [address] 11/23/2017    Sepsis ([facility] [address] 02/05/2017         Plan:    · ABG done and reviewed  · CXR ordered; to be done in ICU  · Cont NRB until eval by Pulm  · Transfer back to ICU for continued management by Pulmonary         Signed By: Jeremy Liu MD     November 25, 2017

## 2017-11-26 NOTE — PROGRESS NOTES
Patient transferred to CCU after rapid response called on 8th floor. Patient is lethargic, responds briefly to voice, mumbles words, no command following at this time. Sinus tach with BBB on monitor, rate 125-128. /59. O2 sat 91% on non-rebreather, to be placed on BiPAP. Temp 99.6 axillary, cold, clammy. Blood sugar 194 @ 1837. Lung sounds rhonchi upper lobes, diminished lower. Patient NT suctioned by RT with return of thick, dark tan sputum. Bowel sounds hypoactive. Patient disimpacted, dark brown soft stool with smears of blood. Patient's brief wet upon arrival, rojas catheter placed per orders with return of >400mL dark brown urine. See previous note for skin assessment. PIV to right AC c/d/i, wrapped. Family notified of patient's transfer to CCU. Will continue to monitor.

## 2017-11-26 NOTE — PROGRESS NOTES
SBP 78/42 MAP 56. Dr. Jarocho Cheng updated. Orders received for 1L NS bolus. Telephone order with readback for clarification.

## 2017-11-26 NOTE — PROGRESS NOTES
SBP 61/49, LR bolus completed, Albumin infusing at present. Dr. Jarocho Cheng updated on patient status and stated will be to see patient shortly.

## 2017-11-26 NOTE — PROGRESS NOTES
Notified son at [phone]; informed patients son of fathers condition and transfer to [facility].

## 2017-11-26 NOTE — PROGRESS NOTES
BiPAP discontinued at this time, patient placed on 4L NC and 2mg morphine given. Levophed gtt and NS discontinued at this time. No distress noted. Patient HR 94, BP 80s/40s. Family at bedside, denies  services.

## 2017-11-26 NOTE — PROGRESS NOTES
Critical Care Daily Progress Note: 11/26/2017    Rajinder Walsh   Admission Date: 11/23/2017         The patient's chart is reviewed and the patient is discussed with the staff. Rajinder Walsh is an [de-identified] M with PMH of aspiration, CAD s/p CABG, MVR, COPD, OA who was admitted on 11/23 with 2 days of poor appetite, falls, abd pain/n/v and patchy RLL infiltrate complicated by ANA, hyperkalemia, mild lactic acidosis. Subjective:    Discussed with family  They wish to proceed with comfort care only   still on pressors     Current Facility-Administered Medications   Medication Dose Route Frequency    acetaminophen (TYLENOL) solution 650 mg  650 mg Per NG tube Q6H PRN    lactated Ringers infusion  100 mL/hr IntraVENous CONTINUOUS    morphine injection 2 mg  2 mg IntraVENous Q1H PRN    diazePAM (VALIUM) tablet 5 mg  5 mg Oral Q8H PRN    oxyCODONE IR (ROXICODONE) tablet 5 mg  5 mg Oral Q6H PRN    albuterol (PROVENTIL VENTOLIN) nebulizer solution 2.5 mg  2.5 mg Nebulization Q6H RT    cyclobenzaprine (FLEXERIL) tablet 10 mg  10 mg Oral TID PRN    sodium chloride (NS) flush 5-10 mL  5-10 mL IntraVENous PRN       Review of Systems: Unobtainable due to patient status. Objective:     Vitals:    11/26/17 0746 11/26/17 0801 11/26/17 0818 11/26/17 0830   BP: 90/50 94/55 (!) 82/50 (!) 80/51   Pulse: (!) 125 (!) 124 (!) 124 (!) 124   Resp: 20 19 21 20   Temp:       SpO2: (!) 89% (!) 89% (!) 89% (!) 89%   Weight:       Height:           Intake and Output:   11/24 1901 - 11/26 0700  In: 4670.6 [P.O.:428; I.V.:4192.6]  Out: 1550 [Urine:1150]       Physical Exam:          Constitutional:  the patient very lethargic on MV support  EENMT:  Sclera clear, pupils equal, oral mucosa dry  Respiratory: Bilateral dependent crackles, rare bilateral rhonchi  Cardiovascular:  Rapid rate, regular rhythm without M,G,R  Gastrointestinal: soft and non-tender; withhypoactive bowel sounds.   Musculoskeletal: Cold distal extremities without cyanosis. There is trace bilateral lower leg edema.   Skin:  no jaundice or rashes  Neurologic: Lethargic; responds to voice and noxious stimuli        LINES: Right femoral CVC    DRIPS:   Norepinephrine    CXR:  Bilateral mixed infiltrates, left > right      LAB  Recent Labs      11/25/17   1837  11/24/17   1744   GLUCPOC  194*  213*      Recent Labs      11/26/17 0427 11/25/17 2037 11/25/17   0636   WBC  11.9*  10.3  15.0*   HGB  12.6*  15.2  13.9   HCT  39.8*  45.7  43.1   PLT  123*  130*  133*     Recent Labs      11/26/17 0427 11/25/17   0636  11/24/17 0435  11/23/17 2016   NA  146*  144  144  142   K  3.8  3.7  4.0  5.8*   CL  109*  110*  107  104   CO2  25  22  21  24   GLU  193*  226*  143*  187*   BUN  55*  41*  46*  53*   CREA  2.08*  1.04  1.03  1.49   MG  2.3   --    --   2.3   PHOS  2.1*   --    --    --    CA  8.2*  8.4  8.4  9.3   TROIQ   --    --    --   <0.02*   ALB  2.1*   --   2.6*  3.4   TBILI  0.6   --   0.7  1.2*   ALT  38   --   50  63   SGOT  80*   --   114*  154*     Recent Labs      11/25/17   2145  11/25/17   1913  11/24/17   1750   PH  7.38  7.35  7.35   PCO2  35  37  40   PO2  85  72*  77*   HCO3  20*  20*  22     Recent Labs      11/26/17 0455  11/25/17 2037 11/25/17   0636   LAC  5.7*  4.8*  2.9*       Assessment:  (Medical Decision Making)     Hospital Problems  Date Reviewed: 7/19/2017          Codes Class Noted POA    * (Principal)Septic shock due to Staphylococcus [facility]) ICD-10-CM: A41.2, R65.21  ICD-9-CM: 038.10, 995.92, 785.52  11/26/2017 Unknown        Lactic acidosis ICD-10-CM: E87.2  ICD-9-CM: 276.2  11/26/2017 Unknown        Acute respiratory failure ([facility] ICD-10-CM: J96.00  ICD-9-CM: 518.81  11/26/2017 Unknown        Dysphagia ICD-10-CM: R13.10  ICD-9-CM: 787.20  11/24/2017 Unknown        ANA (acute kidney injury) ([facility] ICD-10-CM: N17.9  ICD-9-CM: 584.9  11/24/2017 Unknown        Sacral decubitus ulcer, stage III ([facility] ICD-10-CM: V48.448  ICD-9-CM: 707.03, 707.23  11/24/2017 Unknown        Aspiration pneumonia ([facility] ICD-10-CM: J69.0  ICD-9-CM: 507.0  11/23/2017 Yes        Sepsis ([facility] ICD-10-CM: A41.9  ICD-9-CM: 038.9, 995.91  2/5/2017 Yes              Plan:  (Medical Decision Making)     -will stop all meds and proceed with comfort care measures

## 2017-11-26 NOTE — PROGRESS NOTES
Patient found to be hypoxic, desats in 80's. 15LNR applied, RR 26 . RR called. See docflow. Patient stable. Primary RN at bedside. Moved to [id number].

## 2017-11-26 NOTE — PROGRESS NOTES
Bedside and Verbal shift change report given to Crispin Jones RN (oncoming nurse) by Jyoti Ortega (offgoing nurse). Report included the following information SBAR, Kardex, ED Summary, Intake/Output, MAR, Recent Results and Cardiac Rhythm ST. Dual skin assessment completed. Incontinence care performed. Patient turned for comfort.

## 2017-11-26 NOTE — PROGRESS NOTES
Temp 102.5 Axillary. Dr. Jaimee Loaiza updated and stated will place orders. Will place NGT now per MD order.

## 2017-11-26 NOTE — PROGRESS NOTES
TRANSFER - IN REPORT:    Verbal report received from TR French(name) on Leopoldo Peak  being received from 819(unit) for change in patient condition(respiratory distress and lethargy)      Report consisted of patients Situation, Background, Assessment and   Recommendations(SBAR). Information from the following report(s) SBAR, Kardex, Intake/Output, MAR, Recent Results and Cardiac Rhythm sinus tach was reviewed with the receiving nurse. Opportunity for questions and clarification was provided. Assessment completed upon patients arrival to unit and care assumed. Dual skin assessment completed with TR Blandon(name) findings were skin pale, cool, and clammy; BLE mottled from the knee down. Bruising/DTI to left outer ankle with red area to top of foot, outer right ankle with area of redness and second toe joint with area of redness; both heels boggy, feet dry and flaky; Rooke boots already in place. Sacral wound with packing and tegederm with allevyn in place. Bruise to left chest. Old sternal scar present. CHG bath given.

## 2017-11-26 NOTE — PROGRESS NOTES
Bedside report received from [facility]. Incontinence care completed, patient repositioned.  Awaiting family arrival.

## 2017-11-26 NOTE — PROGRESS NOTES
Family at bedside with living will. Patient is a full DNR, copy placed on chart. Dr. Mcgee Paci notified of their arrival as family requested to speak with MD. Patient is on 100% BiPAP with an o2 sat or 88-90%, levophed gtt at 8mcg, BP 90/50 (66) and unresponsive to any stimulation at this time. Temp 101.6, skin is pale, hot/cool, BLE mottled.

## 2017-11-26 NOTE — PROGRESS NOTES
Critical Care Daily Progress Note: 11/26/2017    Nasra Flowers   Admission Date: 11/23/2017         The patient's chart is reviewed and the patient is discussed with the staff. Nasra Flowers is an [de-identified] M with PMH of aspiration, CAD s/p CABG, MVR, COPD, OA who was admitted on 11/23 with 2 days of poor appetite, falls, abd pain/n/v and patchy RLL infiltrate complicated by ANA, hyperkalemia, mild lactic acidosis. Subjective:     Patient became very lethargic around 2100 last evening leading to a Rapid Response. He was subsequently transferred to the MICU and found to be hypoxemic. He was placed on a BIPAP device which he is presently still wearing. His family was notified and they informed us at that time that the patient is a FULL DNR. According to his son the patient has a written advanced directive stating this fact and will bring it in later today to be placed in the chart. His MAP has steadily decreased ov er the past 4 hours and he is now on a Levophed gtt to help keep MAPs > 65. Blood cx 1/2 for GPC in clusters c/w a staph.      Current Facility-Administered Medications   Medication Dose Route Frequency    acetaminophen (TYLENOL) solution 650 mg  650 mg Per NG tube Q6H PRN    albumin human 25% (BUMINATE) solution 12.5 g  12.5 g IntraVENous ONCE    lactated ringers bolus infusion 500 mL  500 mL IntraVENous ONCE    NOREPINephrine (LEVOPHED) 8 mg in dextrose 5% 250 mL infusion  2-16 mcg/min IntraVENous TITRATE    diazePAM (VALIUM) tablet 5 mg  5 mg Oral Q8H PRN    oxyCODONE IR (ROXICODONE) tablet 5 mg  5 mg Oral Q6H PRN    Vancomycin Trough Reminder   Other ONCE    albuterol (PROVENTIL VENTOLIN) nebulizer solution 2.5 mg  2.5 mg Nebulization Q6H RT    guaiFENesin (ROBITUSSIN) 100 mg/5 mL oral liquid 400 mg  400 mg Per NG tube Q8H    pantoprazole (PROTONIX) 40 mg in sodium chloride 0.9 % 10 mL injection  40 mg IntraVENous DAILY    levoFLOXacin (LEVAQUIN) 750 mg in D5W IVPB 750 mg IntraVENous Q24H    aspirin delayed-release tablet 81 mg  81 mg Oral DAILY    atorvastatin (LIPITOR) tablet 40 mg  40 mg Oral QHS    cholecalciferol (VITAMIN D3) tablet 1,000 Units  1,000 Units Oral DAILY    clopidogrel (PLAVIX) tablet 75 mg  75 mg Oral DAILY    cyanocobalamin tablet 1,000 mcg  1,000 mcg Oral DAILY    cyclobenzaprine (FLEXERIL) tablet 10 mg  10 mg Oral TID PRN    docusate sodium (COLACE) capsule 100 mg  100 mg Oral BID    DULoxetine (CYMBALTA) capsule 60 mg  60 mg Oral DAILY    dutasteride (AVODART) capsule 0.5 mg  0.5 mg Oral DAILY    levothyroxine (SYNTHROID) tablet 150 mcg  150 mcg Oral ACB    tamsulosin (FLOMAX) capsule 0.4 mg  0.4 mg Oral QHS    traZODone (DESYREL) tablet 150 mg  150 mg Oral QHS    sodium chloride (NS) flush 5-10 mL  5-10 mL IntraVENous PRN    heparin (porcine) injection 5,000 Units  5,000 Units SubCUTAneous Q8H    0.9% sodium chloride infusion  50 mL/hr IntraVENous CONTINUOUS    piperacillin-tazobactam (ZOSYN) 3.375 g in 0.9% sodium chloride (MBP/ADV) 100 mL  3.375 g IntraVENous Q8H    gabapentin (NEURONTIN) capsule 600 mg  600 mg Oral DAILY    vancomycin (VANCOCIN) 1250 mg in  ml infusion  1,250 mg IntraVENous Q12H       Review of Systems: Unobtainable due to patient status. Objective:     Vitals:    11/26/17 0303 11/26/17 0316 11/26/17 0331 11/26/17 0346   BP:  (!) 65/37 (!) 67/41 (!) 70/49   Pulse:  (!) 129 (!) 126 (!) 122   Resp:  18 19 19   Temp:       SpO2: (!) 89% 93% (!) 87% (!) 86%   Weight:       Height:           Intake and Output:   11/24 0701 - 11/25 1900  In: 0332 [P.O.:458;  I.V.:2430]  Out: -   11/25 1901 - 11/26 0700  In: -   Out: 1000 [Urine:1000]    Physical Exam:          Constitutional:  the patient very lethargic on MV support  EENMT:  Sclera clear, pupils equal, oral mucosa dry  Respiratory: Bilateral dependent crackles, rare bilateral rhonchi  Cardiovascular:  Rapid rate, regular rhythm without M,G,R  Gastrointestinal: soft and non-tender; withhypoactive bowel sounds. Musculoskeletal: Cold distal extremities without cyanosis. There is trace bilateral lower leg edema.   Skin:  no jaundice or rashes  Neurologic: Lethargic; responds to voice and noxious stimuli        LINES: Right femoral CVC    DRIPS:   Norepinephrine    CXR:  Bilateral mixed infiltrates, left > right      LAB  Recent Labs      11/25/17   1837  11/24/17   1744   GLUCPOC  194*  213*      Recent Labs      11/25/17 2037 11/25/17   0636  11/24/17   1812   WBC  10.3  15.0*  15.5*   HGB  15.2  13.9  14.0   HCT  45.7  43.1  41.3   PLT  130*  133*  132*     Recent Labs      11/25/17   0636  11/24/17   0435  11/23/17   2016   NA  144  144  142   K  3.7  4.0  5.8*   CL  110*  107  104   CO2  22  21  24   GLU  226*  143*  187*   BUN  41*  46*  53*   CREA  1.04  1.03  1.49   MG   --    --   2.3   CA  8.4  8.4  9.3   TROIQ   --    --   <0.02*   ALB   --   2.6*  3.4   TBILI   --   0.7  1.2*   ALT   --   50  63   SGOT   --   114*  154*     Recent Labs      11/25/17   2145  11/25/17   1913  11/24/17   1750   PH  7.38  7.35  7.35   PCO2  35  37  40   PO2  85  72*  77*   HCO3  20*  20*  22     Recent Labs      11/25/17 2037 11/25/17   0636  11/25/17   0222   LAC  4.8*  2.9*  2.0       Assessment:  (Medical Decision Making)     Hospital Problems  Date Reviewed: 7/19/2017          Codes Class Noted POA    * (Principal)Septic shock due to Staphylococcus [facility]) ICD-10-CM: A41.2, R65.21  ICD-9-CM: 038.10, 995.92, 785.52  11/26/2017 Unknown        Lactic acidosis ICD-10-CM: E87.2  ICD-9-CM: 276.2  11/26/2017 Unknown        Acute respiratory failure ([facility] ICD-10-CM: J96.00  ICD-9-CM: 518.81  11/26/2017 Unknown        Dysphagia ICD-10-CM: R13.10  ICD-9-CM: 787.20  11/24/2017 Unknown        ANA (acute kidney injury) ([facility] ICD-10-CM: N17.9  ICD-9-CM: 584.9  11/24/2017 Unknown        Sacral decubitus ulcer, stage III ([facility] ICD-10-CM: A05.316  ICD-9-CM: 707.03, 707.23  11/24/2017 Unknown Aspiration pneumonia ([facility] ICD-10-CM: J69.0  ICD-9-CM: 507.0  11/23/2017 Yes        Sepsis ([facility] ICD-10-CM: A41.9  ICD-9-CM: 038.9, 995.91  2/5/2017 Yes              Plan:  (Medical Decision Making)     1. Continue BIPAP on a continuous basis for now. If family consents will intubate and place on MV if patient becomes more acidemic, hypoxemic, or obtunded. 2. Continue volume repletion with albumin and administer vasopressors as needed to keep MAP > 65.  3. Check serial lactic acids and prolactins. 4. Follow-up pancultures. 5. Continue present antibiotics of zosyn (day#4), vancomycin (day#4), and levofloxacin (day #1). 6. Employ aspiration precautions. 7. Keep patient NPO for now. 8. Start IV thiamine and folate. 9. Unable to reach family presently. I will attempt to0 reach them concerning his updated code status and advanced directives. 41 minutes of MD time spent providing critical care services. This total is minus time spent performing procedures. More than 50% of the time documented was spent in face-to-face contact with the patient and in the care of the patient on the floor/unit where the patient is located.     Adrianna Meier MD

## 2017-11-26 NOTE — PROGRESS NOTES
Will, patient's son, updated on patient status to include oxygen demand and BP. Family would like to proceed with central line placement and pressor use. Will notify Dr. Luis Bear.

## 2017-11-26 NOTE — PROGRESS NOTES
Dr. Haley Mata at bedside with patient family, family would like to honor patient wishes and make him comfortable during this time.

## 2017-11-26 NOTE — PROCEDURES
Procedure:  Central Venous Catheter Insertion  CPT - 81645    Indication: Septic shock    Location: Right femoral vein    Time out done and correct patient/location for procedure. Area prepped and sterilized with chlorhexedine. Sterile drapes applied. Patient given local lidocane for anesthesia. Using Seldinger technique a quadruple lumen catheter was inserted in the right femoral vein. Guidewire removed. All ports with blood return and lines flushed. Line sutured in place. Patient tolerated procedure well, no complications.    Estimated Blood loss: Less than 3ml    Amari Ennis MD

## 2017-11-26 NOTE — PROGRESS NOTES
Patient's family at bedside and updated on patient status. Patient's son, Will, stated that patient is a DNR. Dr. Rao Rosenberg updated and stated will change status in chart.

## 2017-11-28 LAB
BACTERIA SPEC CULT: NORMAL
BACTERIA SPEC CULT: NORMAL
PROLACTIN SERPL-MCNC: 29.1 NG/ML
PROLACTIN SERPL-MCNC: 7.9 NG/ML
SERVICE CMNT-IMP: NORMAL
SERVICE CMNT-IMP: NORMAL

## 2017-11-30 LAB
BACTERIA SPEC CULT: ABNORMAL
BACTERIA SPEC CULT: NORMAL
BACTERIA SPEC CULT: NORMAL
GRAM STN SPEC: ABNORMAL
SERVICE CMNT-IMP: ABNORMAL
SERVICE CMNT-IMP: NORMAL
SERVICE CMNT-IMP: NORMAL

## 2017-12-04 NOTE — DISCHARGE SUMMARY
Death Summary    Jodi Weiss  Admission date:  11/23/2017  Discharge date:  11/26/2017    Admitting Diagnosis:  CAP (community acquired pneumonia)  Discharge Diagnosis:    Problem List as of 11/26/2017  Date Reviewed: 7/19/2017          Codes Class Noted - Resolved    * (Principal)Septic shock due to Staphylococcus [facility]) ICD-10-CM: A41.2, R65.21  ICD-9-CM: 038.10, 995.92, 785.52  11/26/2017 - Present        Lactic acidosis ICD-10-CM: E87.2  ICD-9-CM: 276.2  11/26/2017 - Present        Acute respiratory failure ([facility] ICD-10-CM: J96.00  ICD-9-CM: 518.81  11/26/2017 - Present        Dysphagia ICD-10-CM: R13.10  ICD-9-CM: 787.20  11/24/2017 - Present        ANA (acute kidney injury) ([facility] ICD-10-CM: N17.9  ICD-9-CM: 584.9  11/24/2017 - Present        Sacral decubitus ulcer, stage III ([facility] ICD-10-CM: L89.153  ICD-9-CM: 707.03, 707.23  11/24/2017 - Present        Aspiration pneumonia ([facility] ICD-10-CM: J69.0  ICD-9-CM: 507.0  11/23/2017 - Present        Systolic CHF, chronic (HCC) (Chronic) ICD-10-CM: I50.22  ICD-9-CM: 428.22, 428.0  7/19/2017 - Present    Overview Signed 7/19/2017 11:46 AM by Vikram Taylor MD     EF 40-45% post AVR/CABG             Arthritis of knee, right ICD-10-CM: M17.11  ICD-9-CM: 716.96  2/23/2017 - Present        S/P total knee arthroplasty ICD-10-CM: Z96.659  ICD-9-CM: V43.65  2/23/2017 - Present        Sepsis ([facility] ICD-10-CM: A41.9  ICD-9-CM: 038.9, 995.91  2/5/2017 - Present        Diarrhea ICD-10-CM: R19.7  ICD-9-CM: 787.91  2/5/2017 - Present        Syncope and collapse ICD-10-CM: R55  ICD-9-CM: 780.2  2/5/2017 - Present        High cholesterol ICD-10-CM: E78.00  ICD-9-CM: 272.0  Unknown - Present        Arthritis ICD-10-CM: M19.90  ICD-9-CM: 716.90  Unknown - Present        Acute encephalopathy ICD-10-CM: G93.40  ICD-9-CM: 348.30  2/1/2017 - Present        Debility, unspecified ICD-10-CM: R53.81  ICD-9-CM: 799.3  11/1/2016 - Present        Genital edema, male ICD-10-CM: N50.89  ICD-9-CM: 608.86  10/31/2016 - Present        Postoperative anemia due to acute blood loss ICD-10-CM: D62  ICD-9-CM: 285.1  10/27/2016 - Present        Status post coronary artery bypass graft ICD-10-CM: Z95.1  ICD-9-CM: V45.81  10/26/2016 - Present        S/P AVR ICD-10-CM: Z95.2  ICD-9-CM: V43.3  10/26/2016 - Present        Hypoxemia ICD-10-CM: R09.02  ICD-9-CM: 799.02  10/26/2016 - Present        History of tobacco abuse (Chronic) ICD-10-CM: M04.862  ICD-9-CM: V15.82  10/26/2016 - Present        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 Lysis of myocardial adhesions.    2. Aortic valve replacement with a 25 mm Magna Ease Kaleb-  Sterling pericardial valve.    3. Coronary artery bypass grafting x2, grafts consisting of:    a. Left internal mammary artery to left anterior descending.    b. Reversed Left saphenous vein graft to the posterior descending    artery             HTN (hypertension), benign (Chronic) ICD-10-CM: I10  ICD-9-CM: 401.1  4/25/2016 - Present    Overview Signed 4/25/2016 12:15 PM by Albaro Saul     Controlled               Mitral valve regurgitation (Chronic) ICD-10-CM: I34.0  ICD-9-CM: 424.0  4/25/2016 - Present        Impotence due to erectile dysfunction (Chronic) ICD-10-CM: N52.9  ICD-9-CM: 607.84  4/25/2016 - Present        Acquired hypothyroidism (Chronic) ICD-10-CM: E03.9  ICD-9-CM: 244.9  4/25/2016 - Present        Hyperlipidemia (Chronic) ICD-10-CM: E78.5  ICD-9-CM: 272.4  4/25/2016 - Present    Overview Signed 4/25/2016 12:22 PM by [facility]  Dyslipidemia             GERD (gastroesophageal reflux disease) (Chronic) ICD-10-CM: K21.9  ICD-9-CM: 530.81  4/25/2016 - Present Diverticulosis (Chronic) ICD-10-CM: K57.90  ICD-9-CM: 562.10  4/25/2016 - Present        Sciatica (Chronic) ICD-10-CM: M54.30  ICD-9-CM: 724.3  4/25/2016 - Present        Carotid artery stenosis without cerebral infarction (Chronic) ICD-10-CM: N82.73  ICD-9-CM: 433.10  4/25/2016 - Present    Overview Signed 4/25/2016 12:29 PM by Delorse Barthel     s/p left CEA Dr. Nolasco Flatten ago             Other and unspecified hyperlipidemia (Chronic) ICD-10-CM: E78.5  ICD-9-CM: 272.4  5/21/2013 - Present        Unspecified sleep apnea (Chronic) ICD-10-CM: G47.30  ICD-9-CM: 780.57  5/21/2013 - Present        DDD (degenerative disc disease), lumbar (Chronic) ICD-10-CM: M51.36  ICD-9-CM: 722.52  5/13/2013 - Present        COPD (chronic obstructive pulmonary disease) (HCC) (Chronic) ICD-10-CM: J44.9  ICD-9-CM: 496  5/13/2013 - Present        Other peripheral vascular disease(443.89) (Chronic) ICD-10-CM: I73.89  ICD-9-CM: 443.89  5/13/2013 - Present        BPH (benign prostatic hypertrophy) (Chronic) ICD-10-CM: N40.0  ICD-9-CM: 600.00  5/13/2013 - Present        Depression (Chronic) ICD-10-CM: F32.9  ICD-9-CM: 903  5/13/2013 - Present        History of colon polyps (Chronic) ICD-10-CM: Z86.010  ICD-9-CM: V12.72  5/13/2013 - Present        RESOLVED: CAP (community acquired pneumonia) ICD-10-CM: J18.9  ICD-9-CM: 591  11/24/2017 - 11/24/2017        RESOLVED: Encounter for weaning from respirator [facility]) ICD-10-CM: Z99.11  ICD-9-CM: V46.13  10/26/2016 - 10/29/2016        RESOLVED: Sleep apnea ICD-10-CM: G47.30  ICD-9-CM: 780.57  4/25/2016 - 7/29/2016    Overview Signed 4/26/2016 11:58 AM by Yaneth Turner MD     Not using O2 or CPAP at present             RESOLVED: Transient ischemic attack (TIA) ICD-10-CM: G45.9  ICD-9-CM: 435.9  4/25/2016 - 10/29/2016    Overview Signed 4/25/2016 12:31 PM by [address] [place]- very severe, clinically asymptomatic.                    Consultants:  None    Studies/Procedures:  CXR  CT abd/pelvis:   IMPRESSION:  1. No discrete acute abnormality in the abdomen or pelvis. 2. Patchy pulmonary infiltrate posterior right lower lobe may represent  developing pneumonia. 3. There is colonic diverticulosis without CT evidence of acute diverticulosis.         LAB  No results found for this or any previous visit (from the past 24 hour(s)). Hospital course: Chantal Carpenter is an [de-identified] M with PMH of aspiration, CAD s/p CABG, MVR, COPD, OA who was admitted on . He was admitted with poor appetite, falls, abd pain/n/v and patchy RLL infiltrate complicated by ANA, hyperkalemia, mild lactic acidosis. He was given IV antibiotics for PNA, IV fluids and noted to have 1/2 blood cultures with GPC in clusters c/w staph. He was found to be lethargic with low oxygen saturations and hypotensive. He was transferred to the ICU and required vasopressor support. DNR was confirmed. BiPAP therapy was initiated. Family made the decision to proceed with comfort care only. Medications were discontinued and BiPAP was removed. The patient  on 2017 at 1005. Final:  --Pronounced dead at 2018 at 1005  --Total discharge greater than 30 minutes in duration.     Raymon Lagunas NP

## 2017-12-26 LAB
AEROBIC ID BY MALDI, ORJ11T: NORMAL
ANAEROBIC ID BY SEQ, ORI3T: NORMAL
ORG ID BY SEQUENCING, ORI1T: NORMAL
ORGANISM ID BY MALDI, ORJ1T: NORMAL
SOURCE, RSRC62: NORMAL
SOURCE, RSRC67: NORMAL
SPECIMEN SOURCE: NORMAL
SPECIMEN SOURCE: NORMAL

## 2018-01-02 LAB
BACTERIA ISLT: ABNORMAL
RESULT 1, 080571: ABNORMAL
SPECIMEN SOURCE: ABNORMAL

## 2018-01-19 LAB
BACTERIA SPEC CULT: ABNORMAL
GRAM STN SPEC: ABNORMAL
SERVICE CMNT-IMP: ABNORMAL

## 2019-08-19 NOTE — ANESTHESIA PROCEDURE NOTES
Peripheral Block    Start time: 2/23/2017 7:20 AM  End time: 2/23/2017 7:24 AM  Performed by: Mena Lozano  Authorized by: Mena Lozano       Pre-procedure: Indications: at surgeon's request and post-op pain management    Preanesthetic Checklist: patient identified, risks and benefits discussed, site marked, timeout performed, anesthesia consent given and patient being monitored    Timeout Time: 07:20          Block Type:   Block Type:   Adductor canal  Laterality:  Right  Monitoring:  Standard ASA monitoring, continuous pulse ox, frequent vital sign checks, heart rate and oxygen  Injection Technique:  Single shot  Procedures: ultrasound guided and nerve stimulator    Prep: chlorhexidine    Needle Type:  Stimuplex  Needle Gauge:  22 G  Needle Localization:  Nerve stimulator and ultrasound guidance  Motor Response: minimal motor response >0.4 mA    Medication Injected:  0.375%  bupivacaine  Volume (mL):  40  Add'l Medication Injected:  2.0%  lidocaine  Volume (mL):  10    Assessment:  Number of attempts:  1  Injection Assessment:  Local visualized surrounding nerve on ultrasound, negative aspiration for CSF, negative aspiration for blood, no paresthesia, no intravascular symptoms and ultrasound image on chart  Patient tolerance:  Patient tolerated the procedure well with no immediate complications  40 ml used no

## 2021-09-21 ENCOUNTER — TELEPHONE (OUTPATIENT)
Dept: PRIMARY CARE CLINIC | Age: 86
End: 2021-09-21

## 2024-12-12 NOTE — PROGRESS NOTES
Problem: Dysphagia (Adult)  Goal: *Acute Goals and Plan of Care (Insert Text)  STG: Patient will tolerate regular textures and thin liquids when dentures in place without overt signs or symptoms of aspiration 100% of the time. LTG: Patient will tolerate least restrictive diet consistency without respiratory compromise by discharge. Speech language pathology: bedside swallow note: Initial Assessment    NAME/AGE/GENDER: Traci Swanson is a 80 y.o. male  DATE: 2/2/2017  PRIMARY DIAGNOSIS: Acute encephalopathy       ICD-10: Treatment Diagnosis: Oral dysphagia (R13.11)  INTERDISCIPLINARY COLLABORATION: Registered Nurse  PRECAUTIONS/ALLERGIES: Review of patient's allergies indicates no known allergies. ASSESSMENT:Based on the objective data described below, Mr. Sunshine Dewitt presents with no overt signs or symptoms of aspiration with any consistencies assessed. Patient with difficulty masticating fruit in mixed consistency due to lack of lower dentition and he eventually had to spit whole pear out. Patient requesting no diet modification and son will go get patient's lower dentures. Family requests reassessment of swallow with dentures in place prior to changing diet textures to mechanical soft. Will keep patient on regular textures for now and reassess oral phase of swallow once dentition in place. Patient will benefit from skilled intervention to address the below impairments. ?????? ? ? This section established at most recent assessment??????????  PROBLEM LIST (Impairments causing functional limitations):  1. Oral dysphagia  REHABILITATION POTENTIAL FOR STATED GOALS: Excellent  PLAN OF CARE:   Patient will benefit from skilled intervention to address the following impairments.   RECOMMENDATIONS AND PLANNED INTERVENTIONS (Benefits and precautions of therapy have been discussed with the patient.):  · continue prescribed diet  MEDICATIONS:  · With liquid  COMPENSATORY STRATEGIES/MODIFICATIONS INCLUDING:  · Alternate liquids/solids  · Make sure oral cavity is completely clear at completion of meal  OTHER RECOMMENDATIONS (including follow up treatment recommendations):   · Patient education  RECOMMENDED DIET MODIFICATIONS DISCUSSED WITH:  · Family  · Patient  FREQUENCY/DURATION: Continue to follow patient x1 to reassess swallow with dentures in placeRECOMMENDED REHABILITATION/EQUIPMENT: (at time of discharge pending progress):   Continue Skilled Therapy. SUBJECTIVE:   Patient cooperative. Family present. History of Present Injury/Illness: Mr. Flavio Morales  has a past medical history of Aortic valve stenosis, nonrheumatic (4/25/2016); Arthritis; Cor pulmonale ([facility] Cyst near tailbone; DDD (degenerative disc disease), lumbar; Depression; Diverticulosis; Edema (04/25/2016); Former cigarette smoker; Genital edema, male (10/31/2016); Heart murmur; Hepatitis B; High cholesterol; History of colon polyps (5/13/2013); History of hepatitis A; History of staph infection; Long term (current) use of anticoagulants; Lumbago; SOB (shortness of breath); Spinal stenosis; TIA (transient ischemic attack) (5/13/2013); Transient ischemic attack (TIA) (2002); and Unspecified sleep apnea (05/21/2013). He also  has a past surgical history that includes cardiac surg procedure unlist; orthopaedic; hernia repair (Left); heart catheterization; colonoscopy; carotid endarterectomy (Left, 2002); and back surgery. Present Symptoms: altered mental status   Pain Intensity 1: 0  Pain Location 1: Head  Pain Orientation 1: Anterior  Pain Intervention(s) 1: Medication (see MAR)  Current Medications:   No current facility-administered medications on file prior to encounter. Current Outpatient Prescriptions on File Prior to Encounter   Medication Sig Dispense Refill    aspirin delayed-release 81 mg tablet Take 1 Tab by mouth daily. 90 Tab 3    metoprolol succinate (TOPROL-XL) 25 mg XL tablet Take 0.5 Tabs by mouth daily.  45 Tab 3    clopidogrel (PLAVIX) 75 mg tablet Take 1 Tab by mouth daily. Indications: Myocardial Reinfarction Prevention 30 Tab 2    potassium chloride (K-DUR, KLOR-CON) 20 mEq tablet Take 2 Tabs by mouth daily. 60 Tab 2    atorvastatin (LIPITOR) 80 mg tablet Take 1 Tab by mouth nightly. 30 Tab 2    furosemide (LASIX) 20 mg tablet Take 1 Tab by mouth two (2) times a day. 60 Tab 2    ferrous gluconate 324 mg (38 mg iron) tablet Take 1 Tab by mouth Daily (before breakfast). (Patient taking differently: Take 27 mg by mouth Daily (before breakfast). ) 30 Tab 0    oxyCODONE-acetaminophen (PERCOCET) 5-325 mg per tablet Take 1 Tab by mouth every four (4) hours as needed. Max Daily Amount: 6 Tabs. 30 Tab 0    cyanocobalamin (VITAMIN B-12) 1,000 mcg tablet Take 1,000 mcg by mouth every morning.  DULoxetine (CYMBALTA) 60 mg capsule Take 60 mg by mouth every morning.  esomeprazole (NEXIUM) 40 mg capsule Take 40 mg by mouth every morning.  gabapentin (NEURONTIN) 300 mg capsule Take 300 mg by mouth two (2) times a day.  levothyroxine (SYNTHROID) 150 mcg tablet Take 150 mcg by mouth Daily (before breakfast).  tamsulosin (FLOMAX) 0.4 mg capsule Take 0.4 mg by mouth nightly.  finasteride (PROSCAR) 5 mg tablet Take 5 mg by mouth nightly.  diazepam (VALIUM) 5 mg tablet Take 5 mg by mouth every six (6) hours as needed for Anxiety.  cyclobenzaprine (FLEXERIL) 10 mg tablet Take  by mouth three (3) times daily as needed for Muscle Spasm(s).  traZODone (DESYREL) 150 mg tablet Take 150 mg by mouth nightly.  multivitamins-minerals-lutein (CENTRUM SILVER) Tab Take 1 Tab by mouth every morning.  Cholecalciferol, Vitamin D3, (VITAMIN D3) 1,000 unit cap Take 1,000 Units by mouth every morning.        Current Dietary Status:  Regular textures, thin liquids    Social History/Home Situation:    Home Environment: Private residence  # Steps to Enter: 2  Rails to Enter: Yes  Hand Rails : Bilateral  Wheelchair Ramp: No  One/Two Story Residence: Two story, live on 1st floor  # of Interior Steps: 12  Lift Chair Available: Yes  Living Alone: Yes  Support Systems: Family member(s)  Patient Expects to be Discharged to[de-identified] Private residence  Current DME Used/Available at Home: Walker, rolling  Tub or Shower Type: Tub/Shower combination  OBJECTIVE:   Respiratory Status:        CXR Results:IMPRESSION: Low lung volumes. MRI/CT Results:IMPRESSION:   1. Cerebral volume loss. 2. No acute intracranial hemorrhage. Cognitive and Communication Status:  Neurologic State: Alert  Orientation Level: Oriented to person;Oriented to place;Oriented to time  Cognition: Follows commands  Perception: Appears intact  Perseveration: No perseveration noted  Safety/Judgement: Fall prevention    BEDSIDE SWALLOW EVALUATION  Oral Assessment:  Oral Assessment  Labial: Decreased seal  Dentition: Upper dentures (Lower dentures not available)  Oral Hygiene: good  Lingual: No impairment  Velum: No impairment  P.O. Trials:  Patient Position: Upright in chair    The patient was given the following:   Consistency Presented: Thin liquid; Solid;Pudding;Puree;Mixed consistency  How Presented: Self-fed/presented;SLP-fed/presented;Cup/sip;Cup/gulp; Spoon;Straw;Successive swallows    ORAL PHASE:  Bolus Acceptance: No impairment  Bolus Formation/Control: Impaired  Propulsion: Delayed (# of seconds)  Type of Impairment: Delayed;Mastication  Oral Residue: 10-50% of bolus    PHARYNGEAL PHASE:  Initiation of Swallow: No impairment  Laryngeal Elevation: Functional  Aspiration Signs/Symptoms: None  Vocal Quality: No impairment     Effective Modifications:  Alternate liquids/solids     Pharyngeal Phase Characteristics: No impairment, issues, or problems     OTHER OBSERVATIONS:  Rate/bite size: Questionable   Endurance:  Questionable   Comments: no lower dentition available     Tool Used: Dysphagia Outcome and Severity Scale (MASSIEL)    Score Comments   Normal Diet  [] 7 With no strategies or extra time needed   Functional Swallow  [x] 6 May have mild oral or pharyngeal delay       Mild Dysphagia    [] 5 Which may require one diet consistency restricted (those who demonstrate penetration which is entirely cleared on MBS would be included)   Mild-Moderate Dysphagia  [] 4 With 1-2 diet consistencies restricted       Moderate Dysphagia  [] 3 With 2 or more diet consistencies restricted       Moderately Severe Dysphagia  [] 2 With partial PO strategies (trials with ST only)       Severe Dysphagia  [] 1 With inability to tolerate any PO safely          Score:  Initial: 6 Most Recent: X (Date: -- )   Interpretation of Tool: The Dysphagia Outcome and Severity Scale (MASSIEL) is a simple, easy-to-use, 7-point scale developed to systematically rate the functional severity of dysphagia based on objective assessment and make recommendations for diet level, independence level, and type of nutrition. Score 7 6 5 4 3 2 1   Modifier CH CI CJ CK CL CM CN   ?  Swallowing:     - CURRENT STATUS: CI - 1%-19% impaired, limited or restricted    - GOAL STATUS:  CH - 0% impaired, limited or restricted    - D/C STATUS:  ---------------To be determined---------------  [place] / [place] / Product Type: Medicare /     TREATMENT:    (In addition to Assessment/Re-Assessment sessions the following treatments were rendered)  Assessment/Reassessment only, no treatment provided today  MODALITIES:                                                                    ORAL MOTOR  EXERCISES:                                                                                                                                                                      LARYNGEAL / PHARYNGEAL EXERCISES: __________________________________________________________________________________________________  Safety:   After treatment position/precautions:  · Up in chair  · Family at bedside  Treatment Assessment:    Progression/Medical Necessity:   · Patient is expected to demonstrate progress in swallow timeliness, swallow function and diet tolerance to improve swallow safety. Compliance with Program/Exercises: Will assess as treatment progresses. Reason for Continuation of Services/Other Comments:  · Patient continues to require skilled intervention due to need to reassess with full dentures in place. Recommendations/Intent for next treatment session: \"Treatment next visit will focus on reassessment of oral phase of swallow\".     Total Treatment Duration:  Time In: 1100  Time Out: [address] [place], [place], CCC-SLP 01:30

## (undated) DEVICE — 3000CC GUARDIAN II: Brand: GUARDIAN

## (undated) DEVICE — BIPOLAR SEALER 23-112-1 AQM 6.0: Brand: AQUAMANTYS ®

## (undated) DEVICE — SOLUTION IRRIG 3000ML 0.9% SOD CHL FLX CONT 0797208] ICU MEDICAL INC]

## (undated) DEVICE — GOWN,REINF,POLY,ECL,PP SLV,XL: Brand: MEDLINE

## (undated) DEVICE — T4 HOOD

## (undated) DEVICE — TRAY CATH 16F DRN BG LTX -- CONVERT TO ITEM 363158

## (undated) DEVICE — 3M™ COBAN™ NL STERILE NON-LATEX SELF-ADHERENT WRAP, 2084S, 4 IN X 5 YD (10 CM X 4,5 M), 18 ROLLS/CASE: Brand: 3M™ COBAN™

## (undated) DEVICE — SOLUTION IV 500ML 0.9% SOD CHL FLX CONT

## (undated) DEVICE — 2000CC GUARDIAN II: Brand: GUARDIAN

## (undated) DEVICE — SUTURE PDS II SZ 1 L96IN ABSRB VLT TP-1 L65MM 1/2 CIR Z880G

## (undated) DEVICE — (D)PREP SKN CHLRAPRP APPL 26ML -- CONVERT TO ITEM 371833

## (undated) DEVICE — SKIN STAPLER: Brand: SIGNET

## (undated) DEVICE — OSCILLATING TIP SAW CARTRIDGE: Brand: STRYKER PRECISION

## (undated) DEVICE — AMD ANTIMICROBIAL NON-ADHERENT PAD,0.2% POLYHEXAMETHYLENE BIGUANIDE HCI (PHMB): Brand: TELFA

## (undated) DEVICE — SYR 50ML LR LCK 1ML GRAD NSAF --

## (undated) DEVICE — NEEDLE HYPO 21GA L1.5IN INTRAMUSCULAR S STL LATCH BVL UP

## (undated) DEVICE — SOLUTION IV 1000ML 0.9% SOD CHL

## (undated) DEVICE — DRAPE,TOP,102X53,STERILE: Brand: MEDLINE

## (undated) DEVICE — SUTURE VCRL SZ 1 L27IN ABSRB UD L36MM CP-1 1/2 CIR REV CUT J268H

## (undated) DEVICE — PACK PROCEDURE SURG TOT KNEE

## (undated) DEVICE — SUTURE MCRYL SZ 2-0 L27IN ABSRB UD CP-1 1 L36MM 1/2 CIR REV Y266H

## (undated) DEVICE — SPONGE LAP 18X18IN STRL -- 5/PK

## (undated) DEVICE — FAN SPRAY KIT: Brand: PULSAVAC®

## (undated) DEVICE — MEDI-VAC YANKAUER SUCTION HANDLE W/BULBOUS TIP: Brand: CARDINAL HEALTH

## (undated) DEVICE — SYR LR LCK 1ML GRAD NSAF 30ML --

## (undated) DEVICE — REM POLYHESIVE ADULT PATIENT RETURN ELECTRODE: Brand: VALLEYLAB

## (undated) DEVICE — CURETTE BNE CEM 10IN DISP --

## (undated) DEVICE — TRAY PREP DRY W/ PREM GLV 2 APPL 6 SPNG 2 UNDPD 1 OVERWRAP

## (undated) DEVICE — Z DISCONTINUED USE 2744636  DRESSING AQUACEL 14 IN ALG W3.5XL14IN POLYUR FLM CVR W/ HYDRCOLL

## (undated) DEVICE — STOCKINETTE TUBE 9X48 -- MEDICHOICE

## (undated) DEVICE — BUTTON SWITCH PENCIL BLADE ELECTRODE, HOLSTER: Brand: EDGE